# Patient Record
Sex: FEMALE | Race: WHITE | Employment: FULL TIME | URBAN - METROPOLITAN AREA
[De-identification: names, ages, dates, MRNs, and addresses within clinical notes are randomized per-mention and may not be internally consistent; named-entity substitution may affect disease eponyms.]

---

## 2017-01-04 ENCOUNTER — OFFICE VISIT (OUTPATIENT)
Dept: INTERNAL MEDICINE CLINIC | Age: 37
End: 2017-01-04

## 2017-01-04 ENCOUNTER — TELEPHONE (OUTPATIENT)
Dept: INTERNAL MEDICINE CLINIC | Age: 37
End: 2017-01-04

## 2017-01-04 VITALS
RESPIRATION RATE: 20 BRPM | SYSTOLIC BLOOD PRESSURE: 105 MMHG | HEIGHT: 65 IN | HEART RATE: 68 BPM | WEIGHT: 221.8 LBS | TEMPERATURE: 97.8 F | BODY MASS INDEX: 36.96 KG/M2 | DIASTOLIC BLOOD PRESSURE: 70 MMHG | OXYGEN SATURATION: 99 %

## 2017-01-04 DIAGNOSIS — E66.9 OBESITY (BMI 30-39.9): Primary | ICD-10-CM

## 2017-01-04 DIAGNOSIS — E06.3 HASHIMOTO'S DISEASE: ICD-10-CM

## 2017-01-04 NOTE — MR AVS SNAPSHOT
Visit Information Date & Time Provider Department Dept. Phone Encounter #  
 1/4/2017  8:30 AM Alexandria Iraheta NP Internist of [address] [place] [address] [id number] Follow-up Instructions Return in about 4 weeks (around 2/1/2017), or if symptoms worsen or fail to improve. Your Appointments 1/18/2017  8:80 PM  
METABOLIC PROGRAM 5 with HRMP WEEKLY CLASS TRICITIES  
HR Metabolic Program ([facility]) Appt Note: WL consult Abhijeet [facility] [address] [facility] [facility] [address]  
  
    
 1/25/2017  1:32 PM  
METABOLIC PROGRAM 5 with HRMP WEEKLY CLASS TRICITIES  
HR Metabolic Program ([facility]) Appt Note: WL consult [facility] [address]  
[phone] 2/14/2017  7:58 PM  
METABOLIC PROGRAM 15 with Alexandria Iraheta NP Internist of [facility] ([facility]) Appt Note: 3 mth f/u  
 [address], [address] [facility] [place] [address]  
  
   
 [address], [address]  
  
    
 3/14/2017  9:90 PM  
METABOLIC PROGRAM 15 with Alexandria Iraheta NP Internist of [facility] ([facility]) Appt Note: 1 mth f/u  
 [address], [address]  
[phone] Upcoming Health Maintenance Date Due Pneumococcal 19-64 Highest Risk (1 of 3 - PCV13) 8/22/1998 DTaP/Tdap/Td series (1 - Tdap) 8/22/2000 PAP AKA CERVICAL CYTOLOGY 8/22/2000 INFLUENZA AGE 9 TO ADULT 8/1/2016 Allergies as of 1/4/2017  Review Complete On: 1/4/2017 By: Alexandria Iraheta NP Severity Noted Reaction Type Reactions Sulfa (Sulfonamide Antibiotics)  04/15/2015    Rash Current Immunizations  Never Reviewed No immunizations on file. Not reviewed this visit You Were Diagnosed With   
  
 Codes Comments Obesity (BMI 30-39.9)    -  Primary ICD-10-CM: T91.3 ICD-9-CM: 278.00   
 Hashimoto's disease     ICD-10-CM: E06.3 ICD-9-CM: 549. 2 Vitals BP Pulse Temp Resp Height(growth percentile) Weight(growth percentile) 105/70 68 97.8 °F (36.6 °C) 20 5' 5\" (1.651 m) 221 lb 12.8 oz (100.6 kg) SpO2 BMI OB Status Smoking Status 99% 36.91 kg/m2 Polycystic Ovarian Syndrome Never Smoker Vitals History BMI and BSA Data Body Mass Index Body Surface Area  
 36.91 kg/m 2 2.15 m 2 Your Updated Medication List  
  
   
This list is accurate as of: 1/4/17  9:11 AM.  Always use your most recent med list.  
  
  
  
  
 Mai Showman 250-50 mcg/dose diskus inhaler Generic drug:  fluticasone-salmeterol Take 1 Puff by inhalation every twelve (12) hours as needed. albuterol 2.5 mg /3 mL (0.083 %) nebulizer solution Commonly known as:  PROVENTIL VENTOLIN  
by Nebulization route once. Indications: ACUTE ASTHMA ATTACK Biotin-Silicon Diox-L-Cysteine 5,000 mcg -100 mg-50 mg Tab Take 5,000 mcg by mouth two (2) times a day. levothyroxine 137 mcg tablet Commonly known as:  SYNTHROID Take 137 mcg by mouth Daily (before breakfast). PREMPRO 0.625-2.5 mg per tablet Generic drug:  estrogen (conjugated)-medroxyPROGESTERone Take 1 Tab by mouth daily. Follow-up Instructions Return in about 4 weeks (around 2/1/2017), or if symptoms worsen or fail to improve. To-Do List   
 02/04/2017 Lab:  ADIPONECTIN (HDL ONLY) 02/04/2017 Lab:  CRP, HIGH SENSITIVITY (HDL ONLY) 02/04/2017 Lab:  FIBRINOGEN (HDL ONLY) 02/04/2017 Lab:  HEMOGLOBIN A1C (HDL ONLY) 02/04/2017 Lab:  INSULIN (HDL ONLY) 02/04/2017 Lab:  LDL-P HDL (HDL ONLY) 02/04/2017 Lab:  LEPTIN (HDL ONLY) 02/04/2017 Lab:  LIPID PANEL (HDL ONLY) 02/04/2017 Lab:  LIPOPROTEIN (A) MASS (HDL ONLY) 02/04/2017 Lab:  LIPOPROTEIN ASSOC PHOSPHOLIPID (HDL ONLY) 02/04/2017 Lab: MAGNESIUM (HDL ONLY) 02/04/2017 Lab:  METABOLIC PANEL, COMPREHENSIVE (HDL ONLY) 02/04/2017 Lab:  SMALL DENSE LDL (HDL ONLY) 02/04/2017 Lab:  URIC ACID (HDL ONLY) 02/04/2017 Lab:  VITAMIN D, 25 HYDROXY (HDL ONLY) Patient Instructions I will call you with your lab results and will plan to start very low calorie 800 calorie- 4 meal replacements after this. Homework for [employer] 
 
Exercise Exercise daily  
- Start slow, gradually increase your time by around 10 to 20 % a week - To prevent injury, take a recovery week every 4 weeks (reduce your exercise time and intensity by 1/2 during this time) - Your goal is to work up to 150 min a week; hard enough that you can't whistle or sing. It may take 6 months to work up to this. - Call the Health  at [facility] labs for exercise ideas ([phone]) Diet Common Side Effects 
 
-Constipation 
-Fatigue 
-Hunger 
-Low sodium 
-Hair Loss 1. Constipation  
     -around 1 out of 5 chance it happens at all 
     -around 1 out of 10 chance you'll need to take something (see below) It can be prevented by Drinking at least 8 glasses of water a day If you're prone to constipation: - Take a Stool Softener every day:  (eg - Dulcolax Stool Softener 100 mg or Miralax) - Eat Plenty of Fiber Get the New Direction products with fiber added Keep your fiber intake to at least 20 grams a day Use SUGAR-FREE products: Fiber One products, Benefiber or Metamucil If you get constipated: 1. Start with a Stool Softener (produces a bowel movement in 72 hr): 
 Examples: 
  Miralax 1 capful twice a day (It's OK to go up to 3 caps for a few days) Dulcolax Stool Softener 100 mg 
 
2. If you still have constipation after 2 or 3 days, add a Stimulant Laxative to the Stool Softener (this will produce a bowel movement in 6 - 12 hr): 
 Examples: Exlax (1 small square) for up to 4 days Dulcolax stimulant laxative Magnesium citrate pill 500 mg start with once a day and go up to 3 times a day if needed 3. Or you can go straight to a combination Stool Softner / Stimulant at night. If you need, you can add a morning dose. Examples: 
  Pericolace 50 mg / 8.25 mg Sennakot-S  50 mg / 8.25 mg 
 
4. If You're Really locked up, get Liquid Magnesium Citrate (take according to the      directions) 2. Fatigue 
     -Everyone goes through this stage More common in the first 2 weeks It's your body adjusting to the Ketogenic diet and it's normal  
 
 
3. Hunger More common in the first few days After your body adjusts to the Ketogenic diet it will go away 4. Low blood levels of sodium 
     -Not very common, especially if you're not taking a fluid pil If you develop a headache, feel fatigued, light-headed or dizzy Add 1/2 cup of bouillon broth every day 5. Hair loss 
     -This is fairly uncommon; you may see more than a usual amount of hair in your brush or the shower drain This can happen with physical stress (surgery, trauma or calorie restriction) or psychological stress. Although is it very concerning, it is often temporary and will resolve within 3 months. Some people notice a benefit from taking a daily dose of Biotin 2,500 mcg and increasing their Fish Oil intake. Other Tips and Helpful Information - Get the following books (all found on [employer]) Change Anything by David Aguirre, Fain Albarran, and Fernanda Barnard Mindless Eating by Anna Daniels Perfectly Yourself by Tc Drake Me, Myself and I - 28 Days to Self-Love by Paige Smallwood - Consume your 4 daily meal replacements equally spaced over the    day No more than 6 hours between each meal 
 Breakfast is especially important - Get the support of family and friends 
 
- Snack-proof your house - Have strategies for social situations, meetings that run over or vacation - When you fall off the plan it's no big deal; just start right back; turn those days into examples of what to avoid next time. Long-term Healthy Weight / Body Fat Different ways to determining your ideal weight: 
1. Keep your waist to less than 1/2 of your height 2. Keep your % body fat to under 30% for female and under 20% if male 3. Keep your BMI around 25 Supplements 1. Vitacost Synergy Basic Multi-Vitamin (Their In-House Brand) Take 1 capsule twice a day Found ONLY at [facility], NOT at [facility], [facility], the Vitamin Shoppe, etc 
    SKU #: [id number]  
    $16.49   / 1 month supply 
    www. [employer]  [phone]  
 
 
2. N-Acetyl Cysteine (NAC) -- 600 mg 
     1 pill once a day Found at many stores but [address] has a good price: 
     Item #: NSI [id number]  $9.99 / 120 pills (4 month supply) 3. Turmeric with Black Pepper Extract (or Bioperine) 500 mg 
    1 pill 2 times a day (more is joint pain or increased inflammation) Found at many stores but [address] has a good price: 
    SKU #: [id number]  $13.64 / 60 pills 4. Fish Oil Take 1 capsule a day [employer] Brand: \"Omega 3 Fish Oil (per pill:  )\" OR 
 
Take 1 Tbsp 3 days a week of any of the following: 
 
Vit[employer] Brand: Lemon or Orange flavored Fish Oil Nutra Sea + D:  Apple flavored Nutra Sea:  Oliver flavored (These are found at most Vitamin Stores or on [employer]) FYI:  
Typical fish oil per pill =  mg and  mg 
Krill oil per pill = EPA 50 - 70 mg and DHA 27 - 250 mg 
 
 
^^^^^^^^^^^^^^^^^^^^^^^^^^^^^^^^^^^^^^^^^^^^^^^^^^^^^^^^^^^^^^^^^^^^^^^^^^^^^^^^^^^^^^^^^^^^^^^ Carbohydrates If you do not metabolize carbohydrates well, you will lose weight and keep the weigh off by keeping your carbohydrate intake low. How do you know if you do not metabolize carbs well? If your Triglycerides, sdLCL-C and Insulin Resistance are elevated, then you will do well to follow a low carb diet. Fun Facts About Carbs - The Typical American Diet = 450 grams a day - The Reducing Phase of the VLCD = 40 grams a day - Target for weight loss maintenance: 
 - Eat no more than 30 grams per meal 
 - Eat no more than 10 grams per snack (mid-morning and mid-afternoon snack) Resources for finding out where carbs hide in our foods: 
1. Our Registered Dietitians 2. Www. Atkins. com/tools 3. Read food labels 4. Books - The Calorie Maris Leon - The New Atkins Diet for a New You 
     - Jayleen Haley's NEW Carb and Calorie 4th Edition (my favorite) 5. Smart phone and Internet-based apps - [employer]  
     - Carb Manager If you want to get a better picture of your cardiac risk, consider getting a coronary calcium score:  Call [phone] to schedule one. Introducing [facility]! Dear Ramon Lovell: Thank you for requesting a [employer] account. Our records indicate that you already have an active [employer] account. You can access your account anytime at [email or web address] Did you know that you can access your hospital and ER discharge instructions at any time in [employer]? You can also review all of your test results from your hospital stay or ER visit. Additional Information If you have questions, please visit the Frequently Asked Questions section of the [employer] website at [email or web address] Remember, [employer] is NOT to be used for urgent needs. For medical emergencies, dial 911. Now available from your iPhone and Android! Please provide this summary of care documentation to your next provider. Your primary care clinician is listed as Beverely Hose.  If you have any questions after today's visit, please call [phone].

## 2017-01-04 NOTE — PROGRESS NOTES
[facility] Weight Loss Program Progress Note:   Re-enroll Initial Physician Visit    Mery Portillo is a 39 y.o. female who is here for medical screening for re-enrollment into the [facility] Weight Loss Program.    2016 she was transitioned into STAR maintenance program in 10/2016 at 205 lbs. Despite exercising 5-6 times a week and following a high protein low carb diet she has gained 16 lbs over the last 2 months. She would like to transition back to the CD to get back down to her goal weight of 170 lbs. She has just had her fasting labs completed this morning, they are not available for review at apt. Encounter Diagnoses   Name Primary?  Obesity (BMI 30-39. 9) Yes    Hashimoto's disease        Weight History  Current weight 221 lbs Body mass index is 36.91 kg/(m^2). Weight when patient stopped the program started star maintenance: 205 lbs  Goal weight 170lbs  Lowest weight on program 173 lbs    Starting weight: 4/15/15 at 298 lbs  Current weight: 221 lbs  Goal weight: 170 lbs    Most recent EKG: 10/14/15 at 194lbs      Weight loss History  See previous intake note    Significant Medical History  Have you ever taken appetite suppressants? no   If yes: Rx or OTC?  n/a   If yes; Any negative side effects?   Ever diagnosed with sleep apnea or put on CPAP? no  Ever had bariatric surgery: no  MI w/ in the last 3 months: no  Type I Diabetes: no  Type II Diabetes: no  Liver or Kidney disease requiring protein restriction: no  Pregnant or planning on becoming pregnant w/in 6 months: no  Recent treatment for Cancer: no  History of Uric-acid Kidney Stone or elevated Uric Acid: no  Peptic ulcer disease not well controlled: no  Recent onset of Inflammatory Bowel Disease: no      If female:     Significant Psychosocial History (before starting or since stopping the program)  Any history of drug abuse or dependence: no  Major lifestyle changes: no  Other significant commitments: no  Any potential unsupportive people: no  What lead to you stopping the program? She had transitioned to star maintenance 10/14/2016 at 194 lbs. She then gained 27lbs over the last 2 months. She did meet with endocrinology and they did discuss that she will always have trouble with weight loss and gain due to hashimotos. Why are you starting back on the program now?  yes  Are you ready? yes    History of binge eating disorder or anorexia : no       Social History  Social History   Substance Use Topics    Smoking status: Never Smoker    Smokeless tobacco: Never Used    Alcohol use No       Exercise  How many days a week do you currently exercise? 5    Do you have any food allergies or sensitivities: no      Objective  Visit Vitals    /70    Pulse 68    Temp 97.8 °F (36.6 °C)    Resp 20    Ht 5' 5\" (1.651 m)    Wt 221 lb 12.8 oz (100.6 kg)    SpO2 99%    BMI 36.91 kg/m2     No LMP recorded. Patient is not currently having periods (Reason: Polycystic Ovarian Syndrome).     ROS   Positives in BOLD    General: negative for - chills, fatigue, fever, weight change  Psych: negative for - anxiety, depression  ENT: negative for - hearing change, nasal congestion, oral lesions,or sore throat  Heme/ Lymph: negative for - bleeding problems, bruising,  or swollen lymph nodes  Endo: negative for - hot flashes, polydipsia/polyuria or temperature intolerance  Resp: negative for - cough, shortness of breath or wheezing  CV: negative for - chest pain, edema or palpitations  GI: negative for - abdominal pain, change in bowel habits, constipation, diarrhea or nausea/vomiting, melena, hematochezia  : negative for - dysuria, hematuria, incontinence  MSK: negative for - joint pain, joint swelling or muscle pain  Neuro: negative for - confusion, headaches, seizures or weakness  Derm: negative for -rash or skin lesion changes      Physical Exam  Appearance: well appearing, obese  HEENT:  Scleral icterus?  no  Neck: Thyromegaly or nodules?  no  Heart:  RRR no murmurs rubs or gallops  Lungs:  CTA  Abdomen:     Hepatomegaly? no   Striae present? yes  Ext:  No LE edema      Encounter Diagnoses   Name Primary?  Obesity (BMI 30-39. 9) Yes    Hashimoto's disease           Plan  1. Meds & interval medical history were reviewed  2. Pt just had labs drawn this morning. She will be going on vacation this week. Will plan to restart VLCD after receiving and reviewing lab results this week, and after return from her trip. She will begin weekly classes when she returns from her trip as well. 3. Continue to follow with PCP and endocrinology for hashimotos. Will be monitoring thyroid levels on labs.

## 2017-01-04 NOTE — PATIENT INSTRUCTIONS
I will call you with your lab results and will plan to start very low calorie 800 calorie- 4 meal replacements after this. Homework for [employer]    Exercise    Exercise daily   - Start slow, gradually increase your time by around 10 to 20 % a week    - To prevent injury, take a recovery week every 4 weeks (reduce your exercise time and intensity by 1/2 during this time)    - Your goal is to work up to 150 min a week; hard enough that you can't whistle or sing. It may take 6 months to work up to this. - Call the Health  at [facility] labs for exercise ideas ([phone])          Diet          Common Side Effects    -Constipation  -Fatigue  -Hunger  -Low sodium  -Hair Loss      1. Constipation        -around 1 out of 5 chance it happens at all       -around 1 out of 10 chance you'll need to take something (see below)    It can be prevented by Drinking at least 8 glasses of water a day    If you're prone to constipation:  - Take a Stool Softener every day:  (eg - Dulcolax Stool Softener 100 mg or Miralax)  - Eat Plenty of Fiber   Get the New Direction products with fiber added   Keep your fiber intake to at least 20 grams a day   Use SUGAR-FREE products: Fiber One products, Benefiber or Metamucil      If you get constipated:  1. Start with a Stool Softener (produces a bowel movement in 72 hr):   Examples:    Miralax 1 capful twice a day (It's OK to go up to 3 caps for a few days)    Dulcolax Stool Softener 100 mg    2. If you still have constipation after 2 or 3 days, add a Stimulant Laxative to the Stool Softener (this will produce a bowel movement in 6 - 12 hr):   Examples:    Exlax (1 small square) for up to 4 days    Dulcolax stimulant laxative    Magnesium citrate pill 500 mg start with once a day and go up to 3 times a day if needed    3. Or you can go straight to a combination Stool Softner / Stimulant at night.   If you need, you can add a morning dose.   Examples:    Pericolace 50 mg / 8.25 mg    Sennakot-S  50 mg / 8.25 mg    4. If You're Really locked up, get Liquid Magnesium Citrate (take according to the      directions)      2. Fatigue       -Everyone goes through this stage  More common in the first 2 weeks  It's your body adjusting to the Ketogenic diet and it's normal       3. Hunger  More common in the first few days  After your body adjusts to the Ketogenic diet it will go away       4. Low blood levels of sodium       -Not very common, especially if you're not taking a fluid pil  If you develop a headache, feel fatigued, light-headed or dizzy  Add 1/2 cup of bouillon broth every day      5. Hair loss       -This is fairly uncommon; you may see more than a usual amount of hair in your brush or the shower drain  This can happen with physical stress (surgery, trauma or calorie restriction) or psychological stress. Although is it very concerning, it is often temporary and will resolve within 3 months. Some people notice a benefit from taking a daily dose of Biotin 2,500 mcg and increasing their Fish Oil intake. Other Tips and Helpful Information    - Get the following books (all found on [address] [facility] [address])    Change Anything by Naya Burns, Janine Rangel, and Rodger Dill    Mindless Eating by David Roy    Perfectly Yourself by Jonathan Matute    Me, Myself and I - 28 Days to Self-Love by Sarita Valdovinos your 4 daily meal replacements equally spaced over the    day   No more than 6 hours between each meal   Breakfast is especially important    - Get the support of family and friends    - Snack-proof your house    - Have strategies for social situations, meetings that run over or vacation      - When you fall off the plan it's no big deal; just start right back; turn those days into examples of what to avoid next time. Long-term Healthy Weight / Body Fat  Different ways to determining your ideal weight:  1.  Keep your waist to less than 1/2 of your height   2. Keep your % body fat to under 30% for female and under 20% if male  3. Keep your BMI around 25        Supplements      1. Vitacost Synergy Basic Multi-Vitamin (Their In-House Brand)      Take 1 capsule twice a day        Found ONLY at [facility], NOT at [facility], [employer], [employer], the Vitamin Shoppe, etc      SKU #: [id number]       $16.49   / 1 month supply      www. [employer]  [phone]       2. N-Acetyl Cysteine (NAC) -- 600 mg       1 pill once a day       Found at many stores but [employer] has a good price:       Item #: [id number]  $9.99 / 120 pills (4 month supply)      3. Turmeric with Black Pepper Extract (or Bioperine) 500 mg      1 pill 2 times a day (more is joint pain or increased inflammation)      Found at many stores but [employer] has a good price:      SKU #: [id number]  $13.64 / 60 pills        4. Fish Oil      Take 1 capsule a day      Vitamin Shoppe Brand: \"Omega 3 Fish Oil (per pill:  )\"                                                               OR    Take 1 Tbsp 3 days a week of any of the following:    Vitamin Shoppe Brand: Lemon or Orange flavored Fish Oil   Nutra Sea + D:  Apple flavored   Nutra Sea:  Oliver flavored   (These are found at most Vitamin Stores or on [employer])    FYI:   Typical fish oil per pill =  mg and  mg  Krill oil per pill = EPA 50 - 70 mg and DHA 27 - 250 mg      ^^^^^^^^^^^^^^^^^^^^^^^^^^^^^^^^^^^^^^^^^^^^^^^^^^^^^^^^^^^^^^^^^^^^^^^^^^^^^^^^^^^^^^^^^^^^^^^      Carbohydrates     If you do not metabolize carbohydrates well, you will lose weight and keep the weigh off by keeping your carbohydrate intake low. How do you know if you do not metabolize carbs well? If your Triglycerides, sdLCL-C and Insulin Resistance are elevated, then you will do well to follow a low carb diet.     Fun Facts About Carbs    - The Typical American Diet = 450 grams a day    - The Reducing Phase of the VLCD = 40 grams a day    - Target for weight loss maintenance:   - Eat no more than 30 grams per meal   - Eat no more than 10 grams per snack (mid-morning and mid-afternoon snack)        Resources for finding out where carbs hide in our foods:  1. Our Registered Dietitians    2. Www. Atkins. com/tools    3. Read food labels    4. Books       - The Calorie Mitchell Estrella       - The Steven System Diet for a New You       - Jayleen Haley's NEW Carb and Calorie 4th Edition (my favorite)    5. Smart phone and Internet-based apps       - [employer]        - Carb Manager      If you want to get a better picture of your cardiac risk, consider getting a coronary calcium score:  Call [phone] to schedule one.

## 2017-01-18 ENCOUNTER — PATIENT MESSAGE (OUTPATIENT)
Dept: INTERNAL MEDICINE CLINIC | Age: 37
End: 2017-01-18

## 2017-01-18 ENCOUNTER — CLINICAL SUPPORT (OUTPATIENT)
Dept: FAMILY MEDICINE CLINIC | Age: 37
End: 2017-01-18

## 2017-01-18 VITALS
WEIGHT: 224.2 LBS | BODY MASS INDEX: 37.36 KG/M2 | HEIGHT: 65 IN | DIASTOLIC BLOOD PRESSURE: 74 MMHG | HEART RATE: 80 BPM | SYSTOLIC BLOOD PRESSURE: 110 MMHG

## 2017-01-18 DIAGNOSIS — E66.9 OBESITY (BMI 30-39.9): Primary | ICD-10-CM

## 2017-01-18 NOTE — PROGRESS NOTES
Progress Note: Weekly Education Class in the [facility] Weight Loss Program   Is there anything that you or the patient needs to let the [address] [facility] Physician know about? no  Over the past week, have you experienced any side-effects? no    Hamilton Ching is a 39 y.o. female who is enrolled in [facility]    Visit Vitals    /74 (BP 1 Location: Left arm, BP Patient Position: Sitting)    Pulse 80    Ht 5' 5\" (1.651 m)    Wt 224 lb 3.2 oz (101.7 kg)    BMI 37.31 kg/m2     Weight Metrics 1/18/2017 1/18/2017 1/4/2017 11/15/2016 11/15/2016 10/12/2016 10/7/2016   Weight - 224 lb 3.2 oz 221 lb 12.8 oz - 212 lb 205 lb 12.8 oz -   Waist Measure Inches 40 - - 37 - - 36   Exercise Mins/week - - - 220 - - -   Body Fat % - - - 39.4 - - -   BMI - 37.31 kg/m2 36.91 kg/m2 - 35.28 kg/m2 34.25 kg/m2 -         Have you received any other medical care this week? no  If yes, where and for what? Have you had any change in your medications since your last visit? no  If yes what? Did you have any problems adhering to the program last week? no  If yes, please explain:       Eating Habits Over Last Week:  Did you take in 64 oz of non-caloric fluids? yes     Did you consume your 4 meal replacements each day?  yes       Physical Activity Over the Past Week:    Aerobic exercise: 115 min  Resistance exercise: 30 min workouts / week

## 2017-01-23 DIAGNOSIS — E06.3 HASHIMOTO'S DISEASE: ICD-10-CM

## 2017-01-23 DIAGNOSIS — E66.9 OBESITY (BMI 30-39.9): ICD-10-CM

## 2017-01-23 NOTE — PROGRESS NOTES
Nurse note from patient's weekly VLCD / LCD / Maintenance class was reviewed. Pertinent medical concerns were:  None noted. Vitals 1/18/2017 1/4/2017 11/15/2016 10/12/2016 10/7/2016   Weight 224 lb 3.2 oz 221 lb 12.8 oz 212 lb 205 lb 12.8 oz 203 lb     Vitals 9/28/2016 9/21/2016 9/13/2016   Weight 203 lb 9.6 oz 204 lb 201 lb 11.2 oz     Continue current regimen.

## 2017-01-25 ENCOUNTER — CLINICAL SUPPORT (OUTPATIENT)
Dept: FAMILY MEDICINE CLINIC | Age: 37
End: 2017-01-25

## 2017-01-25 VITALS
HEART RATE: 71 BPM | DIASTOLIC BLOOD PRESSURE: 74 MMHG | BODY MASS INDEX: 36.59 KG/M2 | WEIGHT: 219.6 LBS | HEIGHT: 65 IN | SYSTOLIC BLOOD PRESSURE: 115 MMHG

## 2017-01-25 DIAGNOSIS — E66.9 OBESITY (BMI 30-39.9): Primary | ICD-10-CM

## 2017-01-25 NOTE — PROGRESS NOTES
Progress Note: Weekly Education Class in the [facility]   Is there anything that you or the patient needs to let the [facility] Physician know about? no  Over the past week, have you experienced any side-effects? no    Yousif Galicia is a 39 y.o. female who is enrolled in [facility]    Visit Vitals    /74 (BP 1 Location: Left arm, BP Patient Position: Sitting)    Pulse 71    Ht 5' 5\" (1.651 m)    Wt 219 lb 9.6 oz (99.6 kg)    BMI 36.54 kg/m2     Weight Metrics 1/25/2017 1/18/2017 1/18/2017 1/4/2017 11/15/2016 11/15/2016 10/12/2016   Weight 219 lb 9.6 oz - 224 lb 3.2 oz 221 lb 12.8 oz - 212 lb 205 lb 12.8 oz   Waist Measure Inches 39 40 - - 37 - -   Exercise Mins/week - - - - 220 - -   Body Fat % - - - - 39.4 - -   BMI 36.54 kg/m2 - 37.31 kg/m2 36.91 kg/m2 - 35.28 kg/m2 34.25 kg/m2         Have you received any other medical care this week? no  If yes, where and for what? Have you had any change in your medications since your last visit? yes  If yes what? Started over the counter medication Vitamin D 2,000 International Units per day    Did you have any problems adhering to the program last week? no  If yes, please explain:       Eating Habits Over Last Week:  Did you take in 64 oz of non-caloric fluids? yes     Did you consume your 4 meal replacements each day?  yes       Physical Activity Over the Past Week:    Aerobic exercise: 150 min  Resistance exercise: 150 min workouts / week

## 2017-01-27 NOTE — PROGRESS NOTES
Nurse note from patient's weekly VLCD / LCD / Maintenance class was reviewed. Pertinent medical concerns were:  None noted. Vitals 1/25/2017 1/18/2017 1/4/2017 11/15/2016   Weight 219 lb 9.6 oz 224 lb 3.2 oz 221 lb 12.8 oz 212 lb     Vitals 10/12/2016 10/7/2016   Weight 205 lb 12.8 oz 203 lb     Continue current regimen.

## 2017-02-01 ENCOUNTER — CLINICAL SUPPORT (OUTPATIENT)
Dept: FAMILY MEDICINE CLINIC | Age: 37
End: 2017-02-01

## 2017-02-01 VITALS
BODY MASS INDEX: 36.39 KG/M2 | DIASTOLIC BLOOD PRESSURE: 74 MMHG | HEIGHT: 65 IN | HEART RATE: 72 BPM | WEIGHT: 218.4 LBS | SYSTOLIC BLOOD PRESSURE: 111 MMHG

## 2017-02-01 DIAGNOSIS — E66.9 OBESITY (BMI 30-39.9): Primary | ICD-10-CM

## 2017-02-01 NOTE — PROGRESS NOTES
Progress Note: Weekly Education Class in the [facility] Loss [facility]   Is there anything that you or the patient needs to let the [facility] Physician know about? no  Over the past week, have you experienced any side-effects? no    Sylvia Arias is a 39 y.o. female who is enrolled in [facility] Loss [facility]    Visit Vitals    /74 (BP 1 Location: Left arm, BP Patient Position: Sitting)    Pulse 72    Ht 5' 5\" (1.651 m)    Wt 218 lb 6.4 oz (99.1 kg)    BMI 36.34 kg/m2     Weight Metrics 2/1/2017 1/25/2017 1/18/2017 1/18/2017 1/4/2017 11/15/2016 11/15/2016   Weight 218 lb 6.4 oz 219 lb 9.6 oz - 224 lb 3.2 oz 221 lb 12.8 oz - 212 lb   Waist Measure Inches 36.5 39 40 - - 37 -   Exercise Mins/week - - - - - 220 -   Body Fat % - - - - - 39.4 -   BMI 36.34 kg/m2 36.54 kg/m2 - 37.31 kg/m2 36.91 kg/m2 - 35.28 kg/m2         Have you received any other medical care this week? no  If yes, where and for what? Have you had any change in your medications since your last visit? no  If yes what? Did you have any problems adhering to the program last week? no  If yes, please explain:       Eating Habits Over Last Week:  Did you take in 64 oz of non-caloric fluids? yes     Did you consume your 4 meal replacements each day?  yes       Physical Activity Over the Past Week:    Aerobic exercise: 150 min  Resistance exercise: 150 min workouts / week

## 2017-02-05 NOTE — PROGRESS NOTES
Nurse note from patient's weekly VLCD / LCD / Maintenance class was reviewed. Pertinent medical concerns were:  None noted. Vitals 2/1/2017 1/25/2017 1/18/2017 1/4/2017 11/15/2016   Weight 218 lb 6.4 oz 219 lb 9.6 oz 224 lb 3.2 oz 221 lb 12.8 oz 212 lb     Vitals 10/12/2016 10/7/2016   Weight 205 lb 12.8 oz 203 lb     Continue current regimen.

## 2017-02-08 ENCOUNTER — CLINICAL SUPPORT (OUTPATIENT)
Dept: FAMILY MEDICINE CLINIC | Age: 37
End: 2017-02-08

## 2017-02-08 VITALS
WEIGHT: 216.4 LBS | SYSTOLIC BLOOD PRESSURE: 113 MMHG | BODY MASS INDEX: 36.06 KG/M2 | HEART RATE: 79 BPM | DIASTOLIC BLOOD PRESSURE: 75 MMHG | HEIGHT: 65 IN

## 2017-02-08 DIAGNOSIS — E66.9 OBESITY (BMI 30-39.9): Primary | ICD-10-CM

## 2017-02-08 NOTE — PROGRESS NOTES
Progress Note: Weekly Education Class in the [facility] Loss [facility]   Is there anything that you or the patient needs to let the [facility] Physician know about? no  Over the past week, have you experienced any side-effects? no    Karely Mandel is a 39 y.o. female who is enrolled in [facility]    Visit Vitals    /75 (BP 1 Location: Left arm, BP Patient Position: Sitting)    Pulse 79    Ht 5' 5\" (1.651 m)    Wt 216 lb 6.4 oz (98.2 kg)    BMI 36.01 kg/m2     Weight Metrics 2/8/2017 2/1/2017 1/25/2017 1/18/2017 1/18/2017 1/4/2017 11/15/2016   Weight 216 lb 6.4 oz 218 lb 6.4 oz 219 lb 9.6 oz - 224 lb 3.2 oz 221 lb 12.8 oz -   Waist Measure Inches 39 36.5 39 40 - - 37   Exercise Mins/week - - - - - - 220   Body Fat % - - - - - - 39.4   BMI 36.01 kg/m2 36.34 kg/m2 36.54 kg/m2 - 37.31 kg/m2 36.91 kg/m2 -         Have you received any other medical care this week? no  If yes, where and for what? Have you had any change in your medications since your last visit? no  If yes what? Did you have any problems adhering to the program last week? no  If yes, please explain:       Eating Habits Over Last Week:  Did you take in 64 oz of non-caloric fluids? yes     Did you consume your 4 meal replacements each day?  yes       Physical Activity Over the Past Week:    Aerobic exercise: 150 min  Resistance exercise: 150 min workouts / week

## 2017-02-12 NOTE — PROGRESS NOTES
Nurse note from patient's weekly VLCD / LCD / Maintenance class was reviewed. Pertinent medical concerns were:  None noted. .  Vitals 2/8/2017 2/1/2017 1/25/2017 1/18/2017   Weight 216 lb 6.4 oz 218 lb 6.4 oz 219 lb 9.6 oz 224 lb 3.2 oz     Vitals 1/4/2017 11/15/2016   Weight 221 lb 12.8 oz 212 lb     Continue current regimen.

## 2017-02-14 ENCOUNTER — OFFICE VISIT (OUTPATIENT)
Dept: INTERNAL MEDICINE CLINIC | Age: 37
End: 2017-02-14

## 2017-02-14 VITALS
TEMPERATURE: 98.3 F | SYSTOLIC BLOOD PRESSURE: 113 MMHG | DIASTOLIC BLOOD PRESSURE: 68 MMHG | BODY MASS INDEX: 36.14 KG/M2 | RESPIRATION RATE: 20 BRPM | HEART RATE: 74 BPM | OXYGEN SATURATION: 98 % | HEIGHT: 65 IN | WEIGHT: 216.9 LBS

## 2017-02-14 DIAGNOSIS — E66.9 OBESITY (BMI 30-39.9): Primary | ICD-10-CM

## 2017-02-14 DIAGNOSIS — E03.9 ACQUIRED HYPOTHYROIDISM: ICD-10-CM

## 2017-02-14 NOTE — PATIENT INSTRUCTIONS
Health Maintenance Due   Topic Date Due    DTaP/Tdap/Td series (1 - Tdap) 08/22/2000    PAP AKA CERVICAL CYTOLOGY  08/22/2000    INFLUENZA AGE 9 TO ADULT  08/01/2016     Goals  - continue with 4 meal replacements a day. - continue with 5 work outs a week. Weight loss goal for 4 weeks:  8   Lbs. You can do it!!! Body Mass Index: Care Instructions  Your Care Instructions    Body mass index (BMI) can help you see if your weight is raising your risk for health problems. It uses a formula to compare how much you weigh with how tall you are. A BMI between 18.5 and 24.9 is considered healthy. A BMI between 25 and 29.9 is considered overweight. A BMI of 30 or higher is considered obese. If your BMI is in the normal range, it means that you have a lower risk for weight-related health problems. If your BMI is in the overweight or obese range, you may be at increased risk for weight-related health problems, such as high blood pressure, heart disease, stroke, arthritis or joint pain, and diabetes. BMI is just one measure of your risk for weight-related health problems. You may be at higher risk for health problems if you are not active, you eat an unhealthy diet, or you drink too much alcohol or use tobacco products. Follow-up care is a key part of your treatment and safety. Be sure to make and go to all appointments, and call your doctor if you are having problems. It's also a good idea to know your test results and keep a list of the medicines you take. How can you care for yourself at home? · Practice healthy eating habits. This includes eating plenty of fruits, vegetables, whole grains, lean protein, and low-fat dairy. · Get at least 30 minutes of exercise 5 days a week or more. Brisk walking is a good choice. You also may want to do other activities, such as running, swimming, cycling, or playing tennis or team sports. · Do not smoke. Smoking can increase your risk for health problems.  If you need help quitting, talk to your doctor about stop-smoking programs and medicines. These can increase your chances of quitting for good. · Limit alcohol to 2 drinks a day for men and 1 drink a day for women. Too much alcohol can cause health problems. If you have a BMI higher than 25  · Your doctor may do other tests to check your risk for weight-related health problems. This may include measuring the distance around your waist. A waist measurement of more than 40 inches in men or 35 inches in women can increase the risk of weight-related health problems. · Talk with your doctor about steps you can take to stay healthy or improve your health. You may need to make lifestyle changes to lose weight and stay healthy, such as changing your diet and getting regular exercise. Where can you learn more? Go to http://shirley-pawan.info/. Enter S176 in the search box to learn more about \"Body Mass Index: Care Instructions. \"  Current as of: February 16, 2016  Content Version: 11.1  © 6820-1981 [employer]. Care instructions adapted under license by [employer] (which disclaims liability or warranty for this information). If you have questions about a medical condition or this instruction, always ask your healthcare professional. [address] any warranty or liability for your use of this information.

## 2017-02-14 NOTE — PROGRESS NOTES
[facility] Progress Note:   F/up Physician Visit    CC: Obesity      Bita Bellamy is a 39 y.o. female who is here for her  f/up physician visit for the VLCD Program. Fatoumata Vásquez has completed 5 weeks of the program to date. She has lost 8 lbs in the last 4 weeks. Hypothyroidism: continues on synthroid 137mcg. Follows with [facility] endocrinology. Starting weight: 4/15/15 at 298 lbs  Current weight: 216 lbs  Goal weight: 170 lbs     Most recent EKG: 10/14/15 at 194lbs    Weight Metrics 2/14/2017 2/8/2017 2/1/2017 1/25/2017 1/18/2017 1/18/2017 1/4/2017   Weight 216 lb 14.4 oz 216 lb 6.4 oz 218 lb 6.4 oz 219 lb 9.6 oz - 224 lb 3.2 oz 221 lb 12.8 oz   Waist Measure Inches 39 39 36.5 39 40 - -   Exercise Mins/week 150 - - - - - -   Body Fat % 40.1 - - - - - -   BMI 36.09 kg/m2 36.01 kg/m2 36.34 kg/m2 36.54 kg/m2 - 37.31 kg/m2 36.91 kg/m2         Current Outpatient Prescriptions   Medication Sig Dispense Refill    levothyroxine (SYNTHROID) 137 mcg tablet Take 137 mcg by mouth Daily (before breakfast).  Biotin-Silicon Diox-L-Cysteine 5,000 mcg-100 mg- 50 mg tab Take 5,000 mcg by mouth two (2) times a day.  fluticasone-salmeterol (ADVAIR DISKUS) 250-50 mcg/dose diskus inhaler Take 1 Puff by inhalation every twelve (12) hours as needed.  albuterol (PROVENTIL VENTOLIN) 2.5 mg /3 mL (0.083 %) nebulizer solution by Nebulization route once. Indications: ACUTE ASTHMA ATTACK      estrogen, conjugated,-medroxyPROGESTERone (PREMPRO) 0.625-2.5 mg per tablet Take 1 Tab by mouth daily. Participation   Did you attend clinic and class last week? yes    Review of Systems  Since your last visit, have you experienced any complications? no  If yes, please list:     No CP, SOB, palpitations, lightheadedness, dizziness, constipation. Positives highlight in BOLD. Are you taking an appetite suppressant? no  If so, is there any Chest Pain, Palpitations or Dizziness?   BP Readings from Last 10 Encounters:   02/14/17 113/68   02/08/17 113/75   02/01/17 111/74   01/25/17 115/74   01/18/17 110/74   01/04/17 105/70   11/15/16 107/71   10/12/16 119/80   10/07/16 117/74   09/28/16 114/77         Have you received any other medical care this week? no  If yes, where and for what? Have you discontinued or changed any medicine or dose of your medicine since your last visit with Dr Sharyle Cheng? no  If yes, where and for what? Diet  How many ounces of calorie-free liquids did you consume each day?  120 oz    How many meal replacements did you take each day? 4    Did you have any problems adhering to the program?  no If yes, please explain:       Exercise  Aerobic exercise: 150 min  Resistance exercise: 150 min workouts / week  Any discomfort?  no     If yes, where? Review of Systems  Complete ROS negative except where noted above    Objective  Visit Vitals    /68 (BP 1 Location: Left arm, BP Patient Position: Sitting)    Pulse 74    Temp 98.3 °F (36.8 °C) (Oral)    Resp 20    Ht 5' 5\" (1.651 m)    Wt 216 lb 14.4 oz (98.4 kg)    SpO2 98%    BMI 36.09 kg/m2     No LMP recorded. Patient is not currently having periods (Reason: Polycystic Ovarian Syndrome). Waist Circumference: I personally reviewed patient's Weight Management Doc Flowsheet  Neck Circumference: I personally reviewed patient's Weight Management Doc Flowsheet  Percent Body Fat: I personally reviewed patient's Weight Management Doc Flowsheet    Physical Exam  Appearance: well appearing, obese, A&O, NAD  HEENT:  NC/AT, PERRL, No scleral icterus  Heart:  RRR without M/R/G  Lungs:  CTAB, no rhonchi, rales, or wheezes with good air exchange   Ext:  No LE Edema    Assessment / Plan    Encounter Diagnoses   Name Primary?  Obesity (BMI 30-39. 9) Yes    Acquired hypothyroidism        1. Weight management improved   Progress was reviewed with patient  - continue with 4 meal replacements a day.   - continue with 5 work outs a week. Weight loss goal for 4 weeks:  8   Lbs.   2.  Labs    Latest results reviewed with patient   Lab slip given to pt for f/up HDL labs    3. Hypothyroidism: continues on synthroid 137mcg daily. tsh not on last labs, confirmed that it would be checked on this set. Patient Instructions     Health Maintenance Due   Topic Date Due    DTaP/Tdap/Td series (1 - Tdap) 08/22/2000    PAP AKA CERVICAL CYTOLOGY  08/22/2000    INFLUENZA AGE 9 TO ADULT  08/01/2016     Goals  - continue with 4 meal replacements a day. - continue with 5 work outs a week. Weight loss goal for 4 weeks:  8   Lbs. You can do it!!! Body Mass Index: Care Instructions  Your Care Instructions    Body mass index (BMI) can help you see if your weight is raising your risk for health problems. It uses a formula to compare how much you weigh with how tall you are. A BMI between 18.5 and 24.9 is considered healthy. A BMI between 25 and 29.9 is considered overweight. A BMI of 30 or higher is considered obese. If your BMI is in the normal range, it means that you have a lower risk for weight-related health problems. If your BMI is in the overweight or obese range, you may be at increased risk for weight-related health problems, such as high blood pressure, heart disease, stroke, arthritis or joint pain, and diabetes. BMI is just one measure of your risk for weight-related health problems. You may be at higher risk for health problems if you are not active, you eat an unhealthy diet, or you drink too much alcohol or use tobacco products. Follow-up care is a key part of your treatment and safety. Be sure to make and go to all appointments, and call your doctor if you are having problems. It's also a good idea to know your test results and keep a list of the medicines you take. How can you care for yourself at home? · Practice healthy eating habits.  This includes eating plenty of fruits, vegetables, whole grains, lean protein, and low-fat dairy. · Get at least 30 minutes of exercise 5 days a week or more. Brisk walking is a good choice. You also may want to do other activities, such as running, swimming, cycling, or playing tennis or team sports. · Do not smoke. Smoking can increase your risk for health problems. If you need help quitting, talk to your doctor about stop-smoking programs and medicines. These can increase your chances of quitting for good. · Limit alcohol to 2 drinks a day for men and 1 drink a day for women. Too much alcohol can cause health problems. If you have a BMI higher than 25  · Your doctor may do other tests to check your risk for weight-related health problems. This may include measuring the distance around your waist. A waist measurement of more than 40 inches in men or 35 inches in women can increase the risk of weight-related health problems. · Talk with your doctor about steps you can take to stay healthy or improve your health. You may need to make lifestyle changes to lose weight and stay healthy, such as changing your diet and getting regular exercise. Where can you learn more? Go to http://shirley-pawan.info/. Enter S176 in the search box to learn more about \"Body Mass Index: Care Instructions. \"  Current as of: February 16, 2016  Content Version: 11.1  © 5661-8863 [employer]. Care instructions adapted under license by [employer] (which disclaims liability or warranty for this information). If you have questions about a medical condition or this instruction, always ask your healthcare professional. [place] [address] any warranty or liability for your use of this information. Follow-up Disposition:  Return in about 4 weeks (around 3/14/2017). 10 minutes of the 15 minutes face to face time with Stacia Mendoza consisted of counseling & coordinating and/or discussing treatment plans in reference to her obesity.   The primary encounter diagnosis was Obesity (BMI 30-39.9). A diagnosis of Acquired hypothyroidism was also pertinent to this visit. The patient is to follow up as scheduled and will report to the ED or the office if symptoms change or increase. The patient has voiced understanding and will comply.

## 2017-02-14 NOTE — PROGRESS NOTES
Chief Complaint   Patient presents with    Weight Management     1. Have you been to the ER, urgent care clinic since your last visit? Hospitalized since your last visit? No    2. Have you seen or consulted any other health care providers outside of the [address] [place] [address] since your last visit? Include any pap smears or colon screening.  No

## 2017-02-14 NOTE — MR AVS SNAPSHOT
Visit Information Date & Time Provider Department Dept. Phone Encounter #  
 2/14/2017  3:00 PM Anay Owusu [place] Internist of [facility] [id number] Follow-up Instructions Return in about 4 weeks (around 3/14/2017). Follow-up and Disposition History Your Appointments 2/22/2017  6:51 PM  
METABOLIC PROGRAM 5 with HRMP WEEKLY CLASS TRICITIES  
HR Metabolic Program ([facility]) Appt Note: weekly class HR Metabolic Program ([facility]) [phone]  
  
    
 3/1/2017  7:91 PM  
METABOLIC PROGRAM 5 with HRMP WEEKLY CLASS TRICITIES  
HR Metabolic Program ([facility]) Appt Note: weekly class HR Metabolic Program ([facility]) [phone]  
  
    
 3/8/2017  0:18 PM  
METABOLIC PROGRAM 5 with HRMP WEEKLY CLASS TRICITIES  
HR Metabolic Program ([facility]) Appt Note: weekly class HR Metabolic Program ([facility]) [phone] 3/14/2017  7:93 PM  
METABOLIC PROGRAM 15 with Anay Owusu NP Internist of [facility] ([facility]) Appt Note: 1 mth f/u  
 [id number] [facility] [phone] [address] [facility] Health Maintenance Date Due DTaP/Tdap/Td series (1 - Tdap) 8/22/2000 PAP AKA CERVICAL CYTOLOGY 8/22/2000 INFLUENZA AGE 9 TO ADULT 8/1/2016 Allergies as of 2/14/2017  Review Complete On: 2/14/2017 By: Anay Owusu NP Severity Noted Reaction Type Reactions Sulfa (Sulfonamide Antibiotics)  04/15/2015    Rash Current Immunizations  Never Reviewed No immunizations on file. Not reviewed this visit You Were Diagnosed With   
  
 Codes Comments Obesity (BMI 30-39.9)    -  Primary ICD-10-CM: G21.6 ICD-9-CM: 278.00 Acquired hypothyroidism     ICD-10-CM: E03.9 ICD-9-CM: 723. 9 Vitals BP Pulse Temp Resp Height(growth percentile) Weight(growth percentile) 113/68 (BP 1 Location: Left arm, BP Patient Position: Sitting) 74 98.3 °F (36.8 °C) (Oral) 20 5' 5\" (1.651 m) 216 lb 14.4 oz (98.4 kg) SpO2 BMI OB Status Smoking Status 98% 36.09 kg/m2 Polycystic Ovarian Syndrome Never Smoker BMI and BSA Data Body Mass Index Body Surface Area 36.09 kg/m 2 2.12 m 2 Your Updated Medication List  
  
   
This list is accurate as of: 2/14/17  3:40 PM.  Always use your most recent med list.  
  
  
  
  
 Pradeep Mins 250-50 mcg/dose diskus inhaler Generic drug:  fluticasone-salmeterol Take 1 Puff by inhalation every twelve (12) hours as needed. albuterol 2.5 mg /3 mL (0.083 %) nebulizer solution Commonly known as:  PROVENTIL VENTOLIN  
by Nebulization route once. Indications: ACUTE ASTHMA ATTACK Biotin-Silicon Diox-L-Cysteine 5,000 mcg -100 mg-50 mg Tab Take 5,000 mcg by mouth two (2) times a day. levothyroxine 137 mcg tablet Commonly known as:  SYNTHROID Take 137 mcg by mouth Daily (before breakfast). PREMPRO 0.625-2.5 mg per tablet Generic drug:  estrogen (conjugated)-medroxyPROGESTERone Take 1 Tab by mouth daily. We Performed the Following CBC WITH AUTOMATED DIFF (HDL ONLY) [14771 CPT(R)] CRP, HIGH SENSITIVITY (HDL ONLY) [88108 CPT(R)] FIBRINOGEN (HDL ONLY) [id number] CPT(R)] HEMOGLOBIN A1C (HDL ONLY) [78871 CPT(R)] INSULIN (HDL ONLY) [id number] CPT(R)] LEPTIN (HDL ONLY) [[id number] Custom] LIPID PANEL (HDL ONLY) [96163 CPT(R)] MAGNESIUM (HDL ONLY) [id number] CPT(R)] METABOLIC PANEL, COMPREHENSIVE (HDL ONLY) [27128 CPT(R)] MYELOPEROXIDASE, AB (HDL ONLY) K926037 CPT(R)] TSH, 3RD GENERATION (HDL ONLY) [id number] CPT(R)] URIC ACID (HDL ONLY) [id number] CPT(R)] URINALYSIS, COMPLETE, HDL ONLY [[id number] Custom] VITAMIN D, 25 HYDROXY (HDL ONLY) [76803 CPT(R)] Follow-up Instructions Return in about 4 weeks (around 3/14/2017). Patient Instructions Health Maintenance Due Topic Date Due  
 DTaP/Tdap/Td series (1 - Tdap) 08/22/2000  PAP AKA CERVICAL CYTOLOGY  08/22/2000  INFLUENZA AGE 9 TO ADULT  08/01/2016 Goals 
- continue with 4 meal replacements a day. - continue with 5 work outs a week. Weight loss goal for 4 weeks:  8   Lbs. You can do it!!! Body Mass Index: Care Instructions Your Care Instructions Body mass index (BMI) can help you see if your weight is raising your risk for health problems. It uses a formula to compare how much you weigh with how tall you are. A BMI between 18.5 and 24.9 is considered healthy. A BMI between 25 and 29.9 is considered overweight. A BMI of 30 or higher is considered obese. If your BMI is in the normal range, it means that you have a lower risk for weight-related health problems. If your BMI is in the overweight or obese range, you may be at increased risk for weight-related health problems, such as high blood pressure, heart disease, stroke, arthritis or joint pain, and diabetes. BMI is just one measure of your risk for weight-related health problems. You may be at higher risk for health problems if you are not active, you eat an unhealthy diet, or you drink too much alcohol or use tobacco products. Follow-up care is a key part of your treatment and safety. Be sure to make and go to all appointments, and call your doctor if you are having problems. It's also a good idea to know your test results and keep a list of the medicines you take. How can you care for yourself at home? · Practice healthy eating habits. This includes eating plenty of fruits, vegetables, whole grains, lean protein, and low-fat dairy. · Get at least 30 minutes of exercise 5 days a week or more. Brisk walking is a good choice. You also may want to do other activities, such as running, swimming, cycling, or playing tennis or team sports. · Do not smoke. Smoking can increase your risk for health problems. If you need help quitting, talk to your doctor about stop-smoking programs and medicines. These can increase your chances of quitting for good. · Limit alcohol to 2 drinks a day for men and 1 drink a day for women. Too much alcohol can cause health problems. If you have a BMI higher than 25 · Your doctor may do other tests to check your risk for weight-related health problems. This may include measuring the distance around your waist. A waist measurement of more than 40 inches in men or 35 inches in women can increase the risk of weight-related health problems. · Talk with your doctor about steps you can take to stay healthy or improve your health. You may need to make lifestyle changes to lose weight and stay healthy, such as changing your diet and getting regular exercise. Where can you learn more? Go to http://shirley-pawan.info/. Enter S176 in the search box to learn more about \"Body Mass Index: Care Instructions. \" Current as of: February 16, 2016 Content Version: 11.1 © 2927-5862 [employer]. Care instructions adapted under license by [employer] (which disclaims liability or warranty for this information). If you have questions about a medical condition or this instruction, always ask your healthcare professional. [address] any warranty or liability for your use of this information. Patient Instructions History Introducing [facility]! Dear Denis Razo: Thank you for requesting a [employer] account. Our records indicate that you already have an active [employer] account. You can access your account anytime at [email or web address] Did you know that you can access your hospital and ER discharge instructions at any time in [employer]? You can also review all of your test results from your hospital stay or ER visit. Additional Information If you have questions, please visit the Frequently Asked Questions section of the [employer] website at [email or web address] Remember, [employer] is NOT to be used for urgent needs. For medical emergencies, dial 911. Now available from your iPhone and Android! Please provide this summary of care documentation to your next provider. Your primary care clinician is listed as [de-identified] M MIRANDA. If you have any questions after today's visit, please call [phone].

## 2017-02-22 ENCOUNTER — CLINICAL SUPPORT (OUTPATIENT)
Dept: FAMILY MEDICINE CLINIC | Age: 37
End: 2017-02-22

## 2017-02-22 VITALS
HEART RATE: 75 BPM | WEIGHT: 213.2 LBS | DIASTOLIC BLOOD PRESSURE: 72 MMHG | HEIGHT: 65 IN | SYSTOLIC BLOOD PRESSURE: 104 MMHG | BODY MASS INDEX: 35.52 KG/M2

## 2017-02-22 DIAGNOSIS — E66.9 OBESITY (BMI 30-39.9): Primary | ICD-10-CM

## 2017-02-22 NOTE — PROGRESS NOTES
Progress Note: Weekly Education Class in the [facility] Loss Program   Is there anything that you or the patient needs to let the [address] [facility] Physician know about? no  Over the past week, have you experienced any side-effects? no    Karely Mandel is a 39 y.o. female who is enrolled in [facility] Loss Program    Visit Vitals    /72 (BP 1 Location: Left arm, BP Patient Position: Sitting)    Pulse 75    Ht 5' 5\" (1.651 m)    Wt 213 lb 3.2 oz (96.7 kg)    BMI 35.48 kg/m2     Weight Metrics 2/22/2017 2/14/2017 2/14/2017 2/8/2017 2/1/2017 1/25/2017 1/18/2017   Weight 213 lb 3.2 oz - 216 lb 14.4 oz 216 lb 6.4 oz 218 lb 6.4 oz 219 lb 9.6 oz -   Waist Measure Inches 37 39 - 39 36.5 39 40   Exercise Mins/week - 150 - - - - -   Body Fat % - 40.1 - - - - -   BMI 35.48 kg/m2 - 36.09 kg/m2 36.01 kg/m2 36.34 kg/m2 36.54 kg/m2 -         Have you received any other medical care this week? no  If yes, where and for what? Have you had any change in your medications since your last visit? no  If yes what? Did you have any problems adhering to the program last week? no  If yes, please explain:       Eating Habits Over Last Week:  Did you take in 64 oz of non-caloric fluids? yes     Did you consume your 4 meal replacements each day?  yes       Physical Activity Over the Past Week:    Aerobic exercise: 150 min  Resistance exercise: 120 min workouts / week

## 2017-02-27 NOTE — PROGRESS NOTES
Nurse note from patient's weekly VLCD / LCD / Maintenance class was reviewed. Pertinent medical concerns were:  None noted. Vitals 2/22/2017 2/14/2017 2/8/2017 2/1/2017   Weight 213 lb 3.2 oz 216 lb 14.4 oz 216 lb 6.4 oz 218 lb 6.4 oz     Vitals 1/25/2017 1/18/2017 1/4/2017 11/15/2016   Weight 219 lb 9.6 oz 224 lb 3.2 oz 221 lb 12.8 oz 212 lb     Continue current regimen.

## 2017-03-01 ENCOUNTER — CLINICAL SUPPORT (OUTPATIENT)
Dept: FAMILY MEDICINE CLINIC | Age: 37
End: 2017-03-01

## 2017-03-01 VITALS
HEIGHT: 65 IN | HEART RATE: 68 BPM | WEIGHT: 211.2 LBS | SYSTOLIC BLOOD PRESSURE: 118 MMHG | DIASTOLIC BLOOD PRESSURE: 75 MMHG | BODY MASS INDEX: 35.19 KG/M2

## 2017-03-01 DIAGNOSIS — E66.9 OBESITY (BMI 30-39.9): Primary | ICD-10-CM

## 2017-03-01 NOTE — PROGRESS NOTES
Progress Note: Weekly Education Class in the [facility] Weight Loss Program   Is there anything that you or the patient needs to let the [address] [facility] Physician know about? no  Over the past week, have you experienced any side-effects? no    Nadiya Randall is a 39 y.o. female who is enrolled in [facility] Loss [facility]    Visit Vitals    /75 (BP 1 Location: Left arm, BP Patient Position: Sitting)    Pulse 68    Ht 5' 5\" (1.651 m)    Wt 211 lb 3.2 oz (95.8 kg)    BMI 35.15 kg/m2     Weight Metrics 3/1/2017 2/22/2017 2/14/2017 2/14/2017 2/8/2017 2/1/2017 1/25/2017   Weight 211 lb 3.2 oz 213 lb 3.2 oz - 216 lb 14.4 oz 216 lb 6.4 oz 218 lb 6.4 oz 219 lb 9.6 oz   Waist Measure Inches 36 37 39 - 39 36.5 39   Exercise Mins/week - - 150 - - - -   Body Fat % - - 40.1 - - - -   BMI 35.15 kg/m2 35.48 kg/m2 - 36.09 kg/m2 36.01 kg/m2 36.34 kg/m2 36.54 kg/m2         Have you received any other medical care this week? no  If yes, where and for what? Have you had any change in your medications since your last visit? no  If yes what? Did you have any problems adhering to the program last week? no  If yes, please explain:       Eating Habits Over Last Week:  Did you take in 64 oz of non-caloric fluids? yes     Did you consume your 4 meal replacements each day?  yes       Physical Activity Over the Past Week:    Aerobic exercise: 150 min  Resistance exercise: 150 min workouts / week

## 2017-03-08 ENCOUNTER — CLINICAL SUPPORT (OUTPATIENT)
Dept: FAMILY MEDICINE CLINIC | Age: 37
End: 2017-03-08

## 2017-03-08 VITALS
DIASTOLIC BLOOD PRESSURE: 73 MMHG | WEIGHT: 209 LBS | SYSTOLIC BLOOD PRESSURE: 115 MMHG | HEIGHT: 65 IN | BODY MASS INDEX: 34.82 KG/M2 | HEART RATE: 75 BPM

## 2017-03-08 DIAGNOSIS — E66.9 OBESITY (BMI 30-39.9): Primary | ICD-10-CM

## 2017-03-14 ENCOUNTER — OFFICE VISIT (OUTPATIENT)
Dept: INTERNAL MEDICINE CLINIC | Age: 37
End: 2017-03-14

## 2017-03-14 VITALS
OXYGEN SATURATION: 100 % | BODY MASS INDEX: 34.74 KG/M2 | RESPIRATION RATE: 14 BRPM | HEIGHT: 65 IN | HEART RATE: 91 BPM | DIASTOLIC BLOOD PRESSURE: 75 MMHG | WEIGHT: 208.5 LBS | TEMPERATURE: 100 F | SYSTOLIC BLOOD PRESSURE: 118 MMHG

## 2017-03-14 DIAGNOSIS — E66.9 OBESITY (BMI 30-39.9): Primary | ICD-10-CM

## 2017-03-14 DIAGNOSIS — J10.1 INFLUENZA B: ICD-10-CM

## 2017-03-14 DIAGNOSIS — K59.00 CONSTIPATION, UNSPECIFIED CONSTIPATION TYPE: ICD-10-CM

## 2017-03-14 LAB
QUICKVUE INFLUENZA TEST: POSITIVE
VALID INTERNAL CONTROL?: YES

## 2017-03-14 RX ORDER — ERYTHROMYCIN 5 MG/G
OINTMENT OPHTHALMIC
Refills: 0 | COMMUNITY
Start: 2017-03-11 | End: 2017-04-18 | Stop reason: ALTCHOICE

## 2017-03-14 RX ORDER — OSELTAMIVIR PHOSPHATE 75 MG/1
75 CAPSULE ORAL 2 TIMES DAILY
Qty: 10 CAP | Refills: 0 | Status: SHIPPED | OUTPATIENT
Start: 2017-03-14 | End: 2017-03-19

## 2017-03-14 NOTE — PROGRESS NOTES
[facility] Progress Note:   F/up Physician Visit    CC: Bradley Cobian is a 39 y.o. female who is here for her  f/up physician visit for the VLCD Program. Nilesh Al has completed 9 weeks of the program to date. She has lost 8 lbs in the last 4 weeks.      Hypothyroidism: continues on synthroid 137mcg. Follows with [facility] endocrinology. Constipation: she has been using miralax as need for constipation, then used this too much last week, so she has been using this less often. She has been suing this every other day now. Her children have been sick with the flu for the last week. Her daughter was treated with tamiflu this weekend. They were not tested for the flu but treated as if they did. She tells me that she got a cold 2 weeks ago. She got pink eye last week, treated with erythromycin ointment for this. Now has been having congestion, aching, low grade fever for the last 2 days. No CP, SOB, no cough. She does have a history of asthma.      Starting weight: 4/15/15 at 298 lbs  Current weight: 208 lbs  Goal weight: 170 lbs      Most recent EKG: 10/14/15 at 194lbs    Weight Metrics 3/14/2017 3/8/2017 3/1/2017 2/22/2017 2/14/2017 2/14/2017 2/8/2017   Weight 208 lb 8 oz 209 lb 211 lb 3.2 oz 213 lb 3.2 oz - 216 lb 14.4 oz 216 lb 6.4 oz   Waist Measure Inches 39 36 36 37 39 - 39   Exercise Mins/week 0 - - - 150 - -   Body Fat % 38.9 - - - 40.1 - -   BMI 34.7 kg/m2 34.78 kg/m2 35.15 kg/m2 35.48 kg/m2 - 36.09 kg/m2 36.01 kg/m2         Current Outpatient Prescriptions   Medication Sig Dispense Refill    levothyroxine (SYNTHROID) 137 mcg tablet Take 137 mcg by mouth Daily (before breakfast).  Biotin-Silicon Diox-L-Cysteine 5,000 mcg-100 mg- 50 mg tab Take 5,000 mcg by mouth two (2) times a day.  fluticasone-salmeterol (ADVAIR DISKUS) 250-50 mcg/dose diskus inhaler Take 1 Puff by inhalation every twelve (12) hours as needed.       albuterol (PROVENTIL VENTOLIN) 2.5 mg /3 mL (0.083 %) nebulizer solution by Nebulization route once. Indications: ACUTE ASTHMA ATTACK      estrogen, conjugated,-medroxyPROGESTERone (PREMPRO) 0.625-2.5 mg per tablet Take 1 Tab by mouth daily.  erythromycin (ILOTYCIN) ophthalmic ointment APPLY TO AFFECTED AREA UP TO 4 TIMES A DAY  0         Participation   Did you attend clinic and class last week? yes    Review of Systems  Since your last visit, have you experienced any complications? no  If yes, please list:     No CP, SOB, palpitations, lightheadedness, dizziness, constipation. Positives highlight in BOLD. Are you taking an appetite suppressant? no  If so, is there any Chest Pain, Palpitations or Dizziness? BP Readings from Last 10 Encounters:   03/14/17 118/75   03/08/17 115/73   03/01/17 118/75   02/22/17 104/72   02/14/17 113/68   02/08/17 113/75   02/01/17 111/74   01/25/17 115/74   01/18/17 110/74   01/04/17 105/70         Have you received any other medical care this week? no  If yes, where and for what? Have you discontinued or changed any medicine or dose of your medicine since your last visit with Dr Zulma Urban? no  If yes, where and for what? Diet  How many ounces of calorie-free liquids did you consume each day?  100 oz    How many meal replacements did you take each day? 4    Did you have any problems adhering to the program?  no If yes, please explain: some constipation      Exercise  Aerobic exercise: 0 min  Resistance exercise: 0 workouts / week  Any discomfort?  no     If yes, where? Review of Systems  Complete ROS negative except where noted above    Objective  Visit Vitals    /75 (BP 1 Location: Left arm, BP Patient Position: Sitting)    Pulse 91    Temp 100 °F (37.8 °C) (Oral)    Resp 14    Ht 5' 5\" (1.651 m)    Wt 208 lb 8 oz (94.6 kg)    SpO2 100%    BMI 34.7 kg/m2     No LMP recorded. Patient is not currently having periods (Reason: Polycystic Ovarian Syndrome). Waist Circumference:  I personally reviewed patient's Weight Management Doc Flowsheet  Neck Circumference: I personally reviewed patient's Weight Management Doc Flowsheet  Percent Body Fat: I personally reviewed patient's Weight Management Doc Flowsheet    Physical Exam  Appearance: well appearing, obese, A&O, NAD  HEENT:  NC/AT, PERRL, No scleral icterus  Heart:  RRR without M/R/G  Lungs:  CTAB, no rhonchi, rales, or wheezes with good air exchange  Abd: soft, non-tender, +BS   Ext:  No LE Edema  Component      Latest Ref Rng & Units 3/14/2017           3:49 PM   VALID INTERNAL CONTROL POC       Yes   QuickVue Influenza test      Negative Positive     Assessment / Plan    Encounter Diagnoses   Name Primary?  Obesity (BMI 30-39. 9) Yes    Flu-like symptoms     Constipation, unspecified constipation type        1. Weight management improved   Progress was reviewed with patient    2. Labs    She has not had labs drawn yet   Lab slip given to pt for f/up HDL labs    3. Hypothyroidism: continues on synthroid 137mcg. She will have her labs drawn this week. 4. Constipation: continue with miralax every other day to help prevent constipation. 5. Influenza B: Rapid influenza B positive today. Symptoms for the last 2 days. Patient does not appear toxic today, vitals stable. Will treat today with tamiflu, discussed medication with patient, she has taken this once before in the past. Discussed serious nature of influenza, risk for complications. Advised her to stay home from work. Advised her to call to follow up with her PCP, she agrees to plan. - hold the VLCD while being treated for flu. - call and follow up with your PCP about your positive flu test.     Weight loss goal for 4 weeks:   8  Lbs. Patient Instructions     Goals  - hold the VLCD while being treated for flu. - call and follow up with your PCP about your positive flu test.     Weight loss goal for 4 weeks:   8  Lbs. You can do it!!!        Body Mass Index: Care Instructions  Your Care Instructions    Body mass index (BMI) can help you see if your weight is raising your risk for health problems. It uses a formula to compare how much you weigh with how tall you are. A BMI between 18.5 and 24.9 is considered healthy. A BMI between 25 and 29.9 is considered overweight. A BMI of 30 or higher is considered obese. If your BMI is in the normal range, it means that you have a lower risk for weight-related health problems. If your BMI is in the overweight or obese range, you may be at increased risk for weight-related health problems, such as high blood pressure, heart disease, stroke, arthritis or joint pain, and diabetes. BMI is just one measure of your risk for weight-related health problems. You may be at higher risk for health problems if you are not active, you eat an unhealthy diet, or you drink too much alcohol or use tobacco products. Follow-up care is a key part of your treatment and safety. Be sure to make and go to all appointments, and call your doctor if you are having problems. It's also a good idea to know your test results and keep a list of the medicines you take. How can you care for yourself at home? · Practice healthy eating habits. This includes eating plenty of fruits, vegetables, whole grains, lean protein, and low-fat dairy. · Get at least 30 minutes of exercise 5 days a week or more. Brisk walking is a good choice. You also may want to do other activities, such as running, swimming, cycling, or playing tennis or team sports. · Do not smoke. Smoking can increase your risk for health problems. If you need help quitting, talk to your doctor about stop-smoking programs and medicines. These can increase your chances of quitting for good. · Limit alcohol to 2 drinks a day for men and 1 drink a day for women. Too much alcohol can cause health problems.   If you have a BMI higher than 25  · Your doctor may do other tests to check your risk for weight-related health problems. This may include measuring the distance around your waist. A waist measurement of more than 40 inches in men or 35 inches in women can increase the risk of weight-related health problems. · Talk with your doctor about steps you can take to stay healthy or improve your health. You may need to make lifestyle changes to lose weight and stay healthy, such as changing your diet and getting regular exercise. Where can you learn more? Go to http://shirley-pawan.info/. Enter S176 in the search box to learn more about \"Body Mass Index: Care Instructions. \"  Current as of: February 16, 2016  Content Version: 11.1  © 6672-6768 [employer]. Care instructions adapted under license by [employer] (which disclaims liability or warranty for this information). If you have questions about a medical condition or this instruction, always ask your healthcare professional. [place] [address] any warranty or liability for your use of this information. Health Maintenance Due   Topic Date Due    DTaP/Tdap/Td series (1 - Tdap) 08/22/2000    PAP AKA CERVICAL CYTOLOGY  08/22/2000    INFLUENZA AGE 9 TO ADULT  08/01/2016             Follow-up Disposition:  Return in about 4 weeks (around 4/11/2017). 10 minutes of the 15 minutes face to face time with Antwon Pollard consisted of counseling & coordinating and/or discussing treatment plans in reference to her obesity. The primary encounter diagnosis was Obesity (BMI 30-39.9). A diagnosis of Flu-like symptoms was also pertinent to this visit. The patient is to follow up as scheduled and will report to the ED or the office if symptoms change or increase. The patient has voiced understanding and will comply.

## 2017-03-14 NOTE — PATIENT INSTRUCTIONS
Goals  - hold the VLCD while being treated for flu. - call and follow up with your PCP about your positive flu test.     Weight loss goal for 4 weeks:   8  Lbs. You can do it!!! Body Mass Index: Care Instructions  Your Care Instructions    Body mass index (BMI) can help you see if your weight is raising your risk for health problems. It uses a formula to compare how much you weigh with how tall you are. A BMI between 18.5 and 24.9 is considered healthy. A BMI between 25 and 29.9 is considered overweight. A BMI of 30 or higher is considered obese. If your BMI is in the normal range, it means that you have a lower risk for weight-related health problems. If your BMI is in the overweight or obese range, you may be at increased risk for weight-related health problems, such as high blood pressure, heart disease, stroke, arthritis or joint pain, and diabetes. BMI is just one measure of your risk for weight-related health problems. You may be at higher risk for health problems if you are not active, you eat an unhealthy diet, or you drink too much alcohol or use tobacco products. Follow-up care is a key part of your treatment and safety. Be sure to make and go to all appointments, and call your doctor if you are having problems. It's also a good idea to know your test results and keep a list of the medicines you take. How can you care for yourself at home? · Practice healthy eating habits. This includes eating plenty of fruits, vegetables, whole grains, lean protein, and low-fat dairy. · Get at least 30 minutes of exercise 5 days a week or more. Brisk walking is a good choice. You also may want to do other activities, such as running, swimming, cycling, or playing tennis or team sports. · Do not smoke. Smoking can increase your risk for health problems. If you need help quitting, talk to your doctor about stop-smoking programs and medicines. These can increase your chances of quitting for good.   · Limit alcohol to 2 drinks a day for men and 1 drink a day for women. Too much alcohol can cause health problems. If you have a BMI higher than 25  · Your doctor may do other tests to check your risk for weight-related health problems. This may include measuring the distance around your waist. A waist measurement of more than 40 inches in men or 35 inches in women can increase the risk of weight-related health problems. · Talk with your doctor about steps you can take to stay healthy or improve your health. You may need to make lifestyle changes to lose weight and stay healthy, such as changing your diet and getting regular exercise. Where can you learn more? Go to http://shirley-pawan.info/. Enter S176 in the search box to learn more about \"Body Mass Index: Care Instructions. \"  Current as of: February 16, 2016  Content Version: 11.1  © 9431-0621 [employer]. Care instructions adapted under license by [employer] (which disclaims liability or warranty for this information). If you have questions about a medical condition or this instruction, always ask your healthcare professional. [place] [address] any warranty or liability for your use of this information.   Health Maintenance Due   Topic Date Due    DTaP/Tdap/Td series (1 - Tdap) 08/22/2000    PAP AKA CERVICAL CYTOLOGY  08/22/2000    INFLUENZA AGE 9 TO ADULT  08/01/2016

## 2017-03-14 NOTE — PROGRESS NOTES
Chief Complaint   Patient presents with    Weight Management     1. Have you been to the ER, urgent care clinic since your last visit? Hospitalized since your last visit? No    2. Have you seen or consulted any other health care providers outside of the [facility] since your last visit? Include any pap smears or colon screening.  No

## 2017-03-14 NOTE — MR AVS SNAPSHOT
Visit Information Date & Time Provider Department Dept. Phone Encounter #  
 3/14/2017  3:30 PM Claudette Siren, [place] Internist of [facility] [id number] Follow-up Instructions Return in about 4 weeks (around 4/11/2017). Your Appointments 3/22/2017  9:84 PM  
METABOLIC PROGRAM 5 with HRMP WEEKLY CLASS TRICITIES  
HR Metabolic Program ([facility] [address]) Appt Note: weekly class HR Metabolic Program ([facility] [address]) [phone]  
  
    
 3/29/2017  0:83 PM  
METABOLIC PROGRAM 5 with HRMP WEEKLY CLASS TRICITIES  
HR Metabolic Program ([address] [place], [place] [address]er Road) Appt Note: weekly class HR Metabolic Program ([facility] [address]) [phone]  
  
    
 4/5/2017  9:15 PM  
METABOLIC PROGRAM 5 with HRMP WEEKLY CLASS TRICITIES  
HR Metabolic Program ([facility] [address]) Appt Note: weekly class HR Metabolic Program ([facility] [address]) [phone] 4/12/2017  2:36 PM  
METABOLIC PROGRAM 5 with HRMP WEEKLY CLASS TRICITIES  
HR Metabolic Program ([address] [place] [address] Road) Appt Note: weekly class HR Metabolic Program ([facility] [address]) [phone] 4/18/2017  6:18 PM  
METABOLIC PROGRAM 15 with Claudette Siren, NP Internist of [facility] ([address] [place] [address]) Appt Note: metabolic program [employer] [address], [address] [place] [address]  
  
   
 [address], [address] Upcoming Health Maintenance Date Due DTaP/Tdap/Td series (1 - Tdap) 8/22/2000 PAP AKA CERVICAL CYTOLOGY 8/22/2000 INFLUENZA AGE 9 TO ADULT 8/1/2016 Allergies as of 3/14/2017  Review Complete On: 3/14/2017 By: Claudette Siren, NP Severity Noted Reaction Type Reactions Sulfa (Sulfonamide Antibiotics)  04/15/2015    Rash Current Immunizations  Never Reviewed No immunizations on file. Not reviewed this visit You Were Diagnosed With   
  
 Codes Comments Obesity (BMI 30-39.9)    -  Primary ICD-10-CM: U12.5 ICD-9-CM: 278.00 Constipation, unspecified constipation type     ICD-10-CM: K59.00 ICD-9-CM: 564.00 Influenza B     ICD-10-CM: J10.1 ICD-9-CM: 487. 1 Vitals BP Pulse Temp Resp Height(growth percentile) Weight(growth percentile) 118/75 (BP 1 Location: Left arm, BP Patient Position: Sitting) 91 100 °F (37.8 °C) (Oral) 14 5' 5\" (1.651 m) 208 lb 8 oz (94.6 kg) SpO2 BMI OB Status Smoking Status 100% 34.7 kg/m2 Polycystic Ovarian Syndrome Never Smoker BMI and BSA Data Body Mass Index Body Surface Area 34.7 kg/m 2 2.08 m 2 Preferred Pharmacy Pharmacy Name Phone [id number] [address] [place] [address] [phone] Your Updated Medication List  
  
   
This list is accurate as of: 3/14/17  3:55 PM.  Always use your most recent med list.  
  
  
  
  
 Hiren Stands 250-50 mcg/dose diskus inhaler Generic drug:  fluticasone-salmeterol Take 1 Puff by inhalation every twelve (12) hours as needed. albuterol 2.5 mg /3 mL (0.083 %) nebulizer solution Commonly known as:  PROVENTIL VENTOLIN  
by Nebulization route once. Indications: ACUTE ASTHMA ATTACK Biotin-Silicon Diox-L-Cysteine 5,000 mcg -100 mg-50 mg Tab Take 5,000 mcg by mouth two (2) times a day. erythromycin ophthalmic ointment Commonly known as:  ILOTYCIN  
APPLY TO AFFECTED AREA UP TO 4 TIMES A DAY  
  
 levothyroxine 137 mcg tablet Commonly known as:  SYNTHROID Take 137 mcg by mouth Daily (before breakfast). oseltamivir 75 mg capsule Commonly known as:  TAMIFLU Take 1 Cap by mouth two (2) times a day for 5 days. PREMPRO 0.625-2.5 mg per tablet Generic drug:  estrogen (conjugated)-medroxyPROGESTERone Take 1 Tab by mouth daily. Prescriptions Sent to Pharmacy  Refills  
 oseltamivir (TAMIFLU) 75 mg capsule 0  
 Sig: Take 1 Cap by mouth two (2) times a day for 5 days. Class: Normal  
 Pharmacy: [address] [place] [address], [facility] #: [phone] Route: Oral  
  
We Performed the Following AMB POC RAPID INFLUENZA TEST [99884 CPT(R)] Follow-up Instructions Return in about 4 weeks (around 4/11/2017). Patient Instructions Goals Weight loss goal for 4 weeks:   8  Lbs. You can do it!!! Body Mass Index: Care Instructions Your Care Instructions Body mass index (BMI) can help you see if your weight is raising your risk for health problems. It uses a formula to compare how much you weigh with how tall you are. A BMI between 18.5 and 24.9 is considered healthy. A BMI between 25 and 29.9 is considered overweight. A BMI of 30 or higher is considered obese. If your BMI is in the normal range, it means that you have a lower risk for weight-related health problems. If your BMI is in the overweight or obese range, you may be at increased risk for weight-related health problems, such as high blood pressure, heart disease, stroke, arthritis or joint pain, and diabetes. BMI is just one measure of your risk for weight-related health problems. You may be at higher risk for health problems if you are not active, you eat an unhealthy diet, or you drink too much alcohol or use tobacco products. Follow-up care is a key part of your treatment and safety. Be sure to make and go to all appointments, and call your doctor if you are having problems. It's also a good idea to know your test results and keep a list of the medicines you take. How can you care for yourself at home? · Practice healthy eating habits. This includes eating plenty of fruits, vegetables, whole grains, lean protein, and low-fat dairy. · Get at least 30 minutes of exercise 5 days a week or more. Brisk walking is a good choice.  You also may want to do other activities, such as running, swimming, cycling, or playing tennis or team sports. · Do not smoke. Smoking can increase your risk for health problems. If you need help quitting, talk to your doctor about stop-smoking programs and medicines. These can increase your chances of quitting for good. · Limit alcohol to 2 drinks a day for men and 1 drink a day for women. Too much alcohol can cause health problems. If you have a BMI higher than 25 · Your doctor may do other tests to check your risk for weight-related health problems. This may include measuring the distance around your waist. A waist measurement of more than 40 inches in men or 35 inches in women can increase the risk of weight-related health problems. · Talk with your doctor about steps you can take to stay healthy or improve your health. You may need to make lifestyle changes to lose weight and stay healthy, such as changing your diet and getting regular exercise. Where can you learn more? Go to http://shirley-pawan.info/. Enter S176 in the search box to learn more about \"Body Mass Index: Care Instructions. \" Current as of: February 16, 2016 Content Version: 11.1 © 8169-1690 [employer]. Care instructions adapted under license by [employer] (which disclaims liability or warranty for this information). If you have questions about a medical condition or this instruction, always ask your healthcare professional. [place] [address] any warranty or liability for your use of this information. Health Maintenance Due Topic Date Due  
 DTaP/Tdap/Td series (1 - Tdap) 08/22/2000  PAP AKA CERVICAL CYTOLOGY  08/22/2000  INFLUENZA AGE 9 TO ADULT  08/01/2016 Introducing [facility] & HE[facility]! Dear Emma Mora: Thank you for requesting a [employer] account. Our records indicate that you already have an active [employer] account. You can access your account anytime at [email or web address] Did you know that you can access your hospital and ER discharge instructions at any time in [employer]? You can also review all of your test results from your hospital stay or ER visit. Additional Information If you have questions, please visit the Frequently Asked Questions section of the [employer] website at [email or web address] Remember, [employer] is NOT to be used for urgent needs. For medical emergencies, dial 911. Now available from your iPhone and Android! Please provide this summary of care documentation to your next provider. Your primary care clinician is listed as [de-identified] M MIRANDA. If you have any questions after today's visit, please call [phone].

## 2017-03-22 ENCOUNTER — CLINICAL SUPPORT (OUTPATIENT)
Dept: FAMILY MEDICINE CLINIC | Age: 37
End: 2017-03-22

## 2017-03-22 VITALS
DIASTOLIC BLOOD PRESSURE: 72 MMHG | HEART RATE: 73 BPM | BODY MASS INDEX: 34.49 KG/M2 | SYSTOLIC BLOOD PRESSURE: 110 MMHG | HEIGHT: 65 IN | WEIGHT: 207 LBS

## 2017-03-22 DIAGNOSIS — E66.9 OBESITY (BMI 30-39.9): Primary | ICD-10-CM

## 2017-03-22 NOTE — PROGRESS NOTES
Progress Note: Weekly Education Class in the [facility] Weight Loss Program   Is there anything that you or the patient needs to let the [facility] Physician know about? no  Over the past week, have you experienced any side-effects? no    Gail Chavez is a 39 y.o. female who is enrolled in [facility] Weight Loss Program    Visit Vitals    /72 (BP 1 Location: Left arm, BP Patient Position: Sitting)    Pulse 73    Ht 5' 5\" (1.651 m)    Wt 207 lb (93.9 kg)    BMI 34.45 kg/m2     Weight Metrics 3/22/2017 3/14/2017 3/8/2017 3/1/2017 2/22/2017 2/14/2017 2/14/2017   Weight 207 lb 208 lb 8 oz 209 lb 211 lb 3.2 oz 213 lb 3.2 oz - 216 lb 14.4 oz   Waist Measure Inches 38 39 36 36 37 39 -   Exercise Mins/week - 0 - - - 150 -   Body Fat % - 38.9 - - - 40.1 -   BMI 34.45 kg/m2 34.7 kg/m2 34.78 kg/m2 35.15 kg/m2 35.48 kg/m2 - 36.09 kg/m2         Have you received any other medical care this week? no  If yes, where and for what? Have you had any change in your medications since your last visit? no  If yes what? Did you have any problems adhering to the program last week? no  If yes, please explain:       Eating Habits Over Last Week:  Did you take in 64 oz of non-caloric fluids? yes     Did you consume your 4 meal replacements each day?  yes       Physical Activity Over the Past Week:    Aerobic exercise: 90 min  Resistance exercise: 90 min workouts / week

## 2017-03-29 ENCOUNTER — CLINICAL SUPPORT (OUTPATIENT)
Dept: FAMILY MEDICINE CLINIC | Age: 37
End: 2017-03-29

## 2017-03-29 VITALS
HEART RATE: 68 BPM | HEIGHT: 65 IN | SYSTOLIC BLOOD PRESSURE: 113 MMHG | DIASTOLIC BLOOD PRESSURE: 74 MMHG | WEIGHT: 207 LBS | BODY MASS INDEX: 34.49 KG/M2

## 2017-03-29 DIAGNOSIS — E66.9 OBESITY (BMI 30-39.9): Primary | ICD-10-CM

## 2017-03-29 NOTE — PROGRESS NOTES
Progress Note: Weekly Education Class in the [facility] Weight Loss Program   Is there anything that you or the patient needs to let the [facility] Physician know about? no  Over the past week, have you experienced any side-effects? no    Neftali Barrientos is a 39 y.o. female who is enrolled in [facility] Weight Loss Program    Visit Vitals    /74 (BP 1 Location: Left arm, BP Patient Position: Sitting)    Pulse 68    Ht 5' 5\" (1.651 m)    Wt 207 lb (93.9 kg)    BMI 34.45 kg/m2     Weight Metrics 3/29/2017 3/22/2017 3/14/2017 3/8/2017 3/1/2017 2/22/2017 2/14/2017   Weight 207 lb 207 lb 208 lb 8 oz 209 lb 211 lb 3.2 oz 213 lb 3.2 oz -   Waist Measure Inches 39 38 39 36 36 37 39   Exercise Mins/week - - 0 - - - 150   Body Fat % - - 38.9 - - - 40.1   BMI 34.45 kg/m2 34.45 kg/m2 34.7 kg/m2 34.78 kg/m2 35.15 kg/m2 35.48 kg/m2 -         Have you received any other medical care this week? no  If yes, where and for what? Have you had any change in your medications since your last visit? no  If yes what? Did you have any problems adhering to the program last week? no  If yes, please explain:       Eating Habits Over Last Week:  Did you take in 64 oz of non-caloric fluids? yes     Did you consume your 4 meal replacements each day?  yes       Physical Activity Over the Past Week:    Aerobic exercise: 150 min  Resistance exercise: 120 min workouts / week

## 2017-03-31 ENCOUNTER — TELEPHONE (OUTPATIENT)
Dept: INTERNAL MEDICINE CLINIC | Age: 37
End: 2017-03-31

## 2017-04-01 LAB
% ALBUMIN, 58A: 61 % (ref 54–71)
25(OH)D3 SERPL-MCNC: 56 NG/ML (ref 30–100)
ABSOLUTE IMMATURE GRANULOCYTES, AIG: 0 X10E3/UL (ref 0–0.1)
ALB/GLOBRATIO, 58C: 1.58 (ref 1.15–2.5)
ALBUMIN SERPL-MCNC: 4.4 G/DL (ref 3.7–5.1)
ALP SERPL-CCNC: 59 U/L (ref 35–125)
ALT SERPL-CCNC: 20 U/L
ANION GAP SERPL CALC-SCNC: 10 MMOL/L (ref 6–18)
AST SERPL W P-5'-P-CCNC: 19 U/L (ref 5–32)
BASOPHILS # BLD: 1 % (ref 0–3)
BASOPHILS NFR BLD: 0 X10E3/UL (ref 0–0.2)
BILIRUB SERPL-MCNC: 0.5 MG/DL
BUN SERPL-MCNC: 17 MG/DL (ref 6–20)
BUN/CREATININE RATIO,BUCR: 21 (ref 10–27)
CALCIUM SERPL-MCNC: 9.4 MG/DL (ref 8.8–10.5)
CHLORIDE SERPL-SCNC: 104 MMOL/L (ref 98–110)
CHOLEST SERPL-MCNC: 192 MG/DL
CO2 SERPL-SCNC: 27 MMOL/L (ref 19–31)
CREAT SERPL-MCNC: 0.8 MG/DL (ref 0.5–0.9)
CRP SERPL HS-MCNC: 0.8 MG/L
EOSINOPHIL # BLD: 1 % (ref 0–7)
EOSINOPHIL NFR BLD: 0.1 X10E3/UL (ref 0–0.4)
ERYTHROCYTE [DISTWIDTH] IN BLOOD BY AUTOMATED COUNT: 13.1 % (ref 11.7–15)
ESTIMATED AVERAGE GLUCOSE, EAG: 99.7 MG/DL
FASTING-Y/N/HRS: 12
FIBRINOGEN ANTIGEN, 117051: 460 MG/DL (ref 126–437)
GLOBCALC, 58B: 2.8 G/DL (ref 1.9–3.5)
GLUCOSE SERPL-MCNC: 88 MG/DL (ref 70–99)
GRANULOCYTES,GRANS: 53 % (ref 40–74)
HCT VFR BLD AUTO: 43 % (ref 34–44)
HDLC SERPL-MCNC: 58 MG/DL
HGB BLD-MCNC: 13.9 G/DL (ref 11.5–15)
HGBA1C-T, HDL6300: 5.1 %
INSULIN,INS: 10 UU/ML (ref 3–9)
LDLC SERPL CALC-MCNC: 123 MG/DL
LYMPHOCYTES # BLD: 1.7 X10E3/UL (ref 0.7–4.5)
LYMPHOCYTES NFR BLD: 40 % (ref 14–46)
Lab: 0
Lab: 1.02 (ref 1–1.03)
Lab: 6
Lab: ABNORMAL
Lab: CLEAR
Lab: PRESENT
Lab: YELLOW
MAGNESIUM SERPL-MCNC: 2.2 MG/DL (ref 1.6–2.4)
MCH RBC QN AUTO: 28 PG (ref 27–34)
MCHC RBC AUTO-ENTMCNC: 33 G/DL (ref 32–36)
MCV RBC AUTO: 87 FL (ref 80–98)
MONOCYTES # BLD: 0.2 X10E3/UL (ref 0.1–1)
MONOCYTES NFR BLD: 5 % (ref 4–13)
MPOAU: 205 PMOL/L
NEUTROPHILS # BLD AUTO: 2.3 X10E3/UL (ref 1.8–7.8)
NON-HDL CHOLESTEROL, 011976: 134 MG/DL
PLATELET # BLD AUTO: 187 X10E3/UL (ref 140–415)
POTASSIUM SERPL-SCNC: 4.2 MMOL/L (ref 3.5–5.3)
PROT SERPL-MCNC: 7.2 G/DL (ref 6.1–8)
RBC # BLD AUTO: 4.9 X10E6/UL (ref 3.8–5.1)
SODIUM SERPL-SCNC: 141 MMOL/L (ref 133–145)
TRIGL SERPL-MCNC: 59 MG/DL (ref ?–150)
TSH SERPL-ACNC: 1.02 UIU/ML (ref 0.27–4.2)
URATE SERPL-MCNC: 3.4 MG/DL (ref 2–6.9)
WBC # BLD AUTO: 4.4 X10E3/UL (ref 4–10.5)

## 2017-04-02 NOTE — PROGRESS NOTES
Nurse note from patient's weekly VLCD / LCD / Maintenance class was reviewed. Pertinent medical concerns were:  None noted.

## 2017-04-03 LAB
FASTING-Y/N/HRS: 12
LEPTIN, SERUM, 146711: 33 NG/ML

## 2017-04-12 ENCOUNTER — TELEPHONE (OUTPATIENT)
Dept: INTERNAL MEDICINE CLINIC | Age: 37
End: 2017-04-12

## 2017-04-12 ENCOUNTER — CLINICAL SUPPORT (OUTPATIENT)
Dept: FAMILY MEDICINE CLINIC | Age: 37
End: 2017-04-12

## 2017-04-12 VITALS
BODY MASS INDEX: 34.29 KG/M2 | SYSTOLIC BLOOD PRESSURE: 113 MMHG | HEART RATE: 69 BPM | WEIGHT: 205.8 LBS | HEIGHT: 65 IN | DIASTOLIC BLOOD PRESSURE: 75 MMHG

## 2017-04-12 DIAGNOSIS — E66.9 OBESITY (BMI 30-39.9): Primary | ICD-10-CM

## 2017-04-12 NOTE — TELEPHONE ENCOUNTER
Pt has physical appt today at 1pm with her PCP Martha Valles, Can we fax over most recent lab results foe this appt

## 2017-04-12 NOTE — PROGRESS NOTES
Progress Note: Weekly Education Class in the [facility] Weight Loss Program   Is there anything that you or the patient needs to let the [facility] Physician know about? no  Over the past week, have you experienced any side-effects? no    Christopher Lorenzo is a 39 y.o. female who is enrolled in [facility] Loss Program    Visit Vitals    /75 (BP 1 Location: Left arm, BP Patient Position: Sitting)    Pulse 69    Ht 5' 5\" (1.651 m)    Wt 205 lb 12.8 oz (93.4 kg)    BMI 34.25 kg/m2     Weight Metrics 4/12/2017 4/12/2017 3/29/2017 3/22/2017 3/14/2017 3/8/2017 3/1/2017   Weight - 205 lb 12.8 oz 207 lb 207 lb 208 lb 8 oz 209 lb 211 lb 3.2 oz   Waist Measure Inches 37 - 39 38 39 36 36   Exercise Mins/week - - - - 0 - -   Body Fat % - - - - 38.9 - -   BMI - 34.25 kg/m2 34.45 kg/m2 34.45 kg/m2 34.7 kg/m2 34.78 kg/m2 35.15 kg/m2         Have you received any other medical care this week? yes  If yes, where and for what? PCP for a Physical    Have you had any change in your medications since your last visit? no  If yes what? Did you have any problems adhering to the program last week? no  If yes, please explain:       Eating Habits Over Last Week:  Did you take in 64 oz of non-caloric fluids? yes     Did you consume your 4 meal replacements each day?  yes       Physical Activity Over the Past Week:    Aerobic exercise: 210 min  Resistance exercise: 150 min workouts / week

## 2017-04-18 ENCOUNTER — OFFICE VISIT (OUTPATIENT)
Dept: INTERNAL MEDICINE CLINIC | Age: 37
End: 2017-04-18

## 2017-04-18 VITALS
SYSTOLIC BLOOD PRESSURE: 118 MMHG | BODY MASS INDEX: 34.91 KG/M2 | DIASTOLIC BLOOD PRESSURE: 72 MMHG | OXYGEN SATURATION: 98 % | WEIGHT: 209.5 LBS | HEIGHT: 65 IN | HEART RATE: 62 BPM | RESPIRATION RATE: 18 BRPM | TEMPERATURE: 98.1 F

## 2017-04-18 DIAGNOSIS — E66.9 OBESITY (BMI 30-39.9): Primary | ICD-10-CM

## 2017-04-18 DIAGNOSIS — E03.9 ACQUIRED HYPOTHYROIDISM: ICD-10-CM

## 2017-04-18 NOTE — MR AVS SNAPSHOT
Visit Information Date & Time Provider Department Dept. Phone Encounter #  
 4/18/2017  3:30 PM Camryn Schmid, [facility] Internist of [address] [id number] Follow-up Instructions Return in about 4 weeks (around 5/16/2017). Upcoming Health Maintenance Date Due DTaP/Tdap/Td series (1 - Tdap) 8/22/2000 PAP AKA CERVICAL CYTOLOGY 8/22/2000 INFLUENZA AGE 9 TO ADULT 8/1/2016 Allergies as of 4/18/2017  Review Complete On: 4/18/2017 By: Camryn Schmid NP Severity Noted Reaction Type Reactions Sulfa (Sulfonamide Antibiotics)  04/15/2015    Rash Current Immunizations  Never Reviewed No immunizations on file. Not reviewed this visit You Were Diagnosed With   
  
 Codes Comments Obesity (BMI 30-39.9)    -  Primary ICD-10-CM: K38.4 ICD-9-CM: 278.00 Acquired hypothyroidism     ICD-10-CM: E03.9 ICD-9-CM: 488. 9 Vitals BP Pulse Temp Resp Height(growth percentile) Weight(growth percentile) 118/72 (BP 1 Location: Left arm, BP Patient Position: Sitting) 62 98.1 °F (36.7 °C) (Oral) 18 5' 5\" (1.651 m) 209 lb 8 oz (95 kg) SpO2 BMI OB Status Smoking Status 98% 34.86 kg/m2 Polycystic Ovarian Syndrome Never Smoker BMI and BSA Data Body Mass Index Body Surface Area 34.86 kg/m 2 2.09 m 2 Preferred Pharmacy Pharmacy Name Phone [id number] [address] [place] [address] [phone] Your Updated Medication List  
  
   
This list is accurate as of: 4/18/17  4:05 PM.  Always use your most recent med list.  
  
  
  
  
 Cletis Martinvalerio 250-50 mcg/dose diskus inhaler Generic drug:  fluticasone-salmeterol Take 1 Puff by inhalation every twelve (12) hours as needed. albuterol 2.5 mg /3 mL (0.083 %) nebulizer solution Commonly known as:  PROVENTIL VENTOLIN  
by Nebulization route once.  Indications: ACUTE ASTHMA ATTACK  
  
 Biotin-Silicon Diox-L-Cysteine 5,000 mcg -100 mg-50 mg Tab Take 5,000 mcg by mouth two (2) times a day. levothyroxine 137 mcg tablet Commonly known as:  SYNTHROID Take 137 mcg by mouth Daily (before breakfast). PREMPRO 0.625-2.5 mg per tablet Generic drug:  estrogen (conjugated)-medroxyPROGESTERone Take 1 Tab by mouth daily. Follow-up Instructions Return in about 4 weeks (around 5/16/2017). To-Do List   
 05/02/2017 Lab:  CRP, HIGH SENSITIVITY (HDL ONLY) 05/02/2017 Lab:  FIBRINOGEN (HDL ONLY) 05/02/2017 Lab:  INSULIN (HDL ONLY) 05/02/2017 Lab:  MAGNESIUM (HDL ONLY) 05/02/2017 Lab:  METABOLIC PANEL, COMPREHENSIVE (HDL ONLY) 05/02/2017 Lab:  TSH, 3RD GENERATION (HDL ONLY) 05/02/2017 Lab:  URIC ACID (HDL ONLY) 05/02/2017 Lab:  VITAMIN D, 25 HYDROXY (HDL ONLY) Patient Instructions Health Maintenance Due Topic Date Due  
 DTaP/Tdap/Td series (1 - Tdap) 08/22/2000  PAP AKA CERVICAL CYTOLOGY  08/22/2000  INFLUENZA AGE 9 TO ADULT  08/01/2016 Goals 
- stick with it!! 
 
Weight loss goal for 4 weeks:   8  Lbs. You can do it!!! Body Mass Index: Care Instructions Your Care Instructions Body mass index (BMI) can help you see if your weight is raising your risk for health problems. It uses a formula to compare how much you weigh with how tall you are. · A BMI lower than 18.5 is considered underweight. · A BMI between 18.5 and 24.9 is considered healthy. · A BMI between 25 and 29.9 is considered overweight. A BMI of 30 or higher is considered obese. If your BMI is in the normal range, it means that you have a lower risk for weight-related health problems.  If your BMI is in the overweight or obese range, you may be at increased risk for weight-related health problems, such as high blood pressure, heart disease, stroke, arthritis or joint pain, and diabetes. If your BMI is in the underweight range, you may be at increased risk for health problems such as fatigue, lower protection (immunity) against illness, muscle loss, bone loss, hair loss, and hormone problems. BMI is just one measure of your risk for weight-related health problems. You may be at higher risk for health problems if you are not active, you eat an unhealthy diet, or you drink too much alcohol or use tobacco products. Follow-up care is a key part of your treatment and safety. Be sure to make and go to all appointments, and call your doctor if you are having problems. It's also a good idea to know your test results and keep a list of the medicines you take. How can you care for yourself at home? · Practice healthy eating habits. This includes eating plenty of fruits, vegetables, whole grains, lean protein, and low-fat dairy. · If your doctor recommends it, get more exercise. Walking is a good choice. Bit by bit, increase the amount you walk every day. Try for at least 30 minutes on most days of the week. · Do not smoke. Smoking can increase your risk for health problems. If you need help quitting, talk to your doctor about stop-smoking programs and medicines. These can increase your chances of quitting for good. · Limit alcohol to 2 drinks a day for men and 1 drink a day for women. Too much alcohol can cause health problems. If you have a BMI higher than 25 · Your doctor may do other tests to check your risk for weight-related health problems. This may include measuring the distance around your waist. A waist measurement of more than 40 inches in men or 35 inches in women can increase the risk of weight-related health problems. · Talk with your doctor about steps you can take to stay healthy or improve your health. You may need to make lifestyle changes to lose weight and stay healthy, such as changing your diet and getting regular exercise. If you have a BMI lower than 18.5 · Your doctor may do other tests to check your risk for health problems. · Talk with your doctor about steps you can take to stay healthy or improve your health. You may need to make lifestyle changes to gain or maintain weight and stay healthy, such as getting more healthy foods in your diet and doing exercises to build muscle. Where can you learn more? Go to http://shirley-pawan.info/. Enter S176 in the search box to learn more about \"Body Mass Index: Care Instructions. \" Current as of: January 23, 2017 Content Version: 11.2 © 4499-0744 [employer] Top-Level Domain. Care instructions adapted under license by [employer] (which disclaims liability or warranty for this information). If you have questions about a medical condition or this instruction, always ask your healthcare professional. [address] any warranty or liability for your use of this information. Introducing [facility]! Dear Rebecca Stauffer: Thank you for requesting a IronPlanet account. Our records indicate that you already have an active IronPlanet account. You can access your account anytime at https://[email or web address]. [employer] Did you know that you can access your hospital and ER discharge instructions at any time in IronPlanet? You can also review all of your test results from your hospital stay or ER visit. Additional Information If you have questions, please visit the Frequently Asked Questions section of the IronPlanet website at [email or web address]. [employer]/. Remember, IronPlanet is NOT to be used for urgent needs. For medical emergencies, dial 911. Now available from your iPhone and Android! Please provide this summary of care documentation to your next provider. Your primary care clinician is listed as [de-identified] M MIRANDA. If you have any questions after today's visit, please call [phone].

## 2017-04-18 NOTE — PROGRESS NOTES
[facility] Progress Note:   F/up Physician Visit    CC: Obesity      Kina Mckinney is a 39 y.o. female who is here for her  f/up physician visit for the VLCD Program. Mary Gonzalez has completed 14 weeks of the program to date. She has gained 1 lbs since our last visit. She admits she did eat off the program for [employer]. She feels bad because she feels she had been doing good before this, had gotten down to 205lbs. She was also sick with the flu last month. She has fully recovered from this now. She is now back on track. She is back at the gym everyday, she does the treadmill and weights.       Starting weight: 4/15/15 at 298 lbs  Current weight: 209 lbs  Goal weight: 170 lbs      Most recent EKG: 10/14/15 at 194lbs    Weight Metrics 4/18/2017 4/18/2017 4/12/2017 4/12/2017 3/29/2017 3/22/2017 3/14/2017   Weight - 209 lb 8 oz - 205 lb 12.8 oz 207 lb 207 lb 208 lb 8 oz   Waist Measure Inches 36 - 37 - 39 38 39   Exercise Mins/week 180 - - - - - 0   Body Fat % 39.1 - - - - - 38.9   BMI - 34.86 kg/m2 - 34.25 kg/m2 34.45 kg/m2 34.45 kg/m2 34.7 kg/m2         Current Outpatient Prescriptions   Medication Sig Dispense Refill    levothyroxine (SYNTHROID) 137 mcg tablet Take 137 mcg by mouth Daily (before breakfast).  Biotin-Silicon Diox-L-Cysteine 5,000 mcg-100 mg- 50 mg tab Take 5,000 mcg by mouth two (2) times a day.  fluticasone-salmeterol (ADVAIR DISKUS) 250-50 mcg/dose diskus inhaler Take 1 Puff by inhalation every twelve (12) hours as needed.  albuterol (PROVENTIL VENTOLIN) 2.5 mg /3 mL (0.083 %) nebulizer solution by Nebulization route once. Indications: ACUTE ASTHMA ATTACK      estrogen, conjugated,-medroxyPROGESTERone (PREMPRO) 0.625-2.5 mg per tablet Take 1 Tab by mouth daily. Participation   Did you attend clinic and class last week?  yes    Review of Systems  Since your last visit, have you experienced any complications? no  If yes, please list:     No CP, SOB, palpitations, lightheadedness, dizziness, constipation. Positives highlight in BOLD. Are you taking an appetite suppressant? no  If so, is there any Chest Pain, Palpitations or Dizziness? BP Readings from Last 10 Encounters:   04/18/17 118/72   04/12/17 113/75   03/29/17 113/74   03/22/17 110/72   03/14/17 118/75   03/08/17 115/73   03/01/17 118/75   02/22/17 104/72   02/14/17 113/68   02/08/17 113/75         Have you received any other medical care this week? no  If yes, where and for what? Have you discontinued or changed any medicine or dose of your medicine since your last visit? no  If yes, where and for what? Diet  How many ounces of calorie-free liquids did you consume each day?  100 oz    How many meal replacements did you take each day? 4    Did you have any problems adhering to the program?  yes If yes, please explain: due to being on vacation      Exercise  Aerobic exercise: 180 min  Resistance exercise: 180 min workouts / week  Any discomfort?  no     If yes, where? Review of Systems  Complete ROS negative except where noted above    Objective  Visit Vitals    /72 (BP 1 Location: Left arm, BP Patient Position: Sitting)    Pulse 62    Temp 98.1 °F (36.7 °C) (Oral)    Resp 18    Ht 5' 5\" (1.651 m)    Wt 209 lb 8 oz (95 kg)    SpO2 98%    BMI 34.86 kg/m2     No LMP recorded. Patient is not currently having periods (Reason: Polycystic Ovarian Syndrome).     Waist Circumference: I personally reviewed patient's Weight Management Doc Flowsheet  Neck Circumference: I personally reviewed patient's Weight Management Doc Flowsheet  Percent Body Fat: I personally reviewed patient's Weight Management Doc Flowsheet    Physical Exam  Appearance: well appearing, obese, A&O, NAD  HEENT:  NC/AT, PERRL, No scleral icterus  Heart:  RRR without M/R/G  Lungs:  CTAB, no rhonchi, rales, or wheezes with good air exchange   Ext: trace LE Edema    Assessment / Plan    Encounter Diagnoses Name Primary?  Obesity (BMI 30-39. 9) Yes    Acquired hypothyroidism        1. Weight management borderline controlled, needs improvement, patient poorly compliant, needs to follow diet more regularly   Progress was reviewed with patient  Weight loss goal for 4 weeks:   8  Lbs.   2.  Labs    Latest results reviewed with patient   Lab slip given to pt for f/up HDL labs    3. Hypothyroidism: continues on synthroid 137mcg daily, TSH normal on labs this month. Patient Instructions     Health Maintenance Due   Topic Date Due    DTaP/Tdap/Td series (1 - Tdap) 08/22/2000    PAP AKA CERVICAL CYTOLOGY  08/22/2000    INFLUENZA AGE 9 TO ADULT  08/01/2016     Goals  - stick with it!!    Weight loss goal for 4 weeks:   8  Lbs. You can do it!!! Body Mass Index: Care Instructions  Your Care Instructions    Body mass index (BMI) can help you see if your weight is raising your risk for health problems. It uses a formula to compare how much you weigh with how tall you are. · A BMI lower than 18.5 is considered underweight. · A BMI between 18.5 and 24.9 is considered healthy. · A BMI between 25 and 29.9 is considered overweight. A BMI of 30 or higher is considered obese. If your BMI is in the normal range, it means that you have a lower risk for weight-related health problems. If your BMI is in the overweight or obese range, you may be at increased risk for weight-related health problems, such as high blood pressure, heart disease, stroke, arthritis or joint pain, and diabetes. If your BMI is in the underweight range, you may be at increased risk for health problems such as fatigue, lower protection (immunity) against illness, muscle loss, bone loss, hair loss, and hormone problems. BMI is just one measure of your risk for weight-related health problems.  You may be at higher risk for health problems if you are not active, you eat an unhealthy diet, or you drink too much alcohol or use tobacco products. Follow-up care is a key part of your treatment and safety. Be sure to make and go to all appointments, and call your doctor if you are having problems. It's also a good idea to know your test results and keep a list of the medicines you take. How can you care for yourself at home? · Practice healthy eating habits. This includes eating plenty of fruits, vegetables, whole grains, lean protein, and low-fat dairy. · If your doctor recommends it, get more exercise. Walking is a good choice. Bit by bit, increase the amount you walk every day. Try for at least 30 minutes on most days of the week. · Do not smoke. Smoking can increase your risk for health problems. If you need help quitting, talk to your doctor about stop-smoking programs and medicines. These can increase your chances of quitting for good. · Limit alcohol to 2 drinks a day for men and 1 drink a day for women. Too much alcohol can cause health problems. If you have a BMI higher than 25  · Your doctor may do other tests to check your risk for weight-related health problems. This may include measuring the distance around your waist. A waist measurement of more than 40 inches in men or 35 inches in women can increase the risk of weight-related health problems. · Talk with your doctor about steps you can take to stay healthy or improve your health. You may need to make lifestyle changes to lose weight and stay healthy, such as changing your diet and getting regular exercise. If you have a BMI lower than 18.5  · Your doctor may do other tests to check your risk for health problems. · Talk with your doctor about steps you can take to stay healthy or improve your health. You may need to make lifestyle changes to gain or maintain weight and stay healthy, such as getting more healthy foods in your diet and doing exercises to build muscle. Where can you learn more? Go to http://shirley-pawan.info/.   Enter S176 in the search box to learn more about \"Body Mass Index: Care Instructions. \"  Current as of: January 23, 2017  Content Version: 11.2  © 3322-1879 [employer]. Care instructions adapted under license by [employer] (which disclaims liability or warranty for this information). If you have questions about a medical condition or this instruction, always ask your healthcare professional. [address] any warranty or liability for your use of this information. Follow-up Disposition:  Return in about 4 weeks (around 5/16/2017). 10 minutes of the 15 minutes face to face time with Zulema Morris consisted of counseling & coordinating and/or discussing treatment plans in reference to her obesity. The primary encounter diagnosis was Obesity (BMI 30-39.9). A diagnosis of Acquired hypothyroidism was also pertinent to this visit. The patient is to follow up as scheduled and will report to the ED or the office if symptoms change or increase. The patient has voiced understanding and will comply.

## 2017-04-18 NOTE — PROGRESS NOTES
Chief Complaint   Patient presents with    Weight Management     1. Have you been to the ER, urgent care clinic since your last visit? Hospitalized since your last visit? No    2. Have you seen or consulted any other health care providers outside of the [address] [place] [address] since your last visit? Include any pap smears or colon screening.  No

## 2017-04-26 ENCOUNTER — CLINICAL SUPPORT (OUTPATIENT)
Dept: FAMILY MEDICINE CLINIC | Age: 37
End: 2017-04-26

## 2017-04-26 VITALS
BODY MASS INDEX: 34.35 KG/M2 | SYSTOLIC BLOOD PRESSURE: 108 MMHG | HEART RATE: 69 BPM | DIASTOLIC BLOOD PRESSURE: 70 MMHG | WEIGHT: 206.2 LBS | HEIGHT: 65 IN

## 2017-04-26 DIAGNOSIS — E66.9 OBESITY (BMI 30-39.9): Primary | ICD-10-CM

## 2017-04-26 NOTE — PROGRESS NOTES
Progress Note: Weekly Education Class in the [facility]   Is there anything that you or the patient needs to let the [facility] Physician know about? no  Over the past week, have you experienced any side-effects? Yes, constipation and is taking Miralax as needed every other day. Giovanny Barron is a 39 y.o. female who is enrolled in [facility]    Visit Vitals    /70 (BP 1 Location: Right arm, BP Patient Position: Sitting)    Pulse 69    Ht 5' 5\" (1.651 m)    Wt 206 lb 3.2 oz (93.5 kg)    BMI 34.31 kg/m2     Weight Metrics 4/26/2017 4/18/2017 4/18/2017 4/12/2017 4/12/2017 3/29/2017 3/22/2017   Weight 206 lb 3.2 oz - 209 lb 8 oz - 205 lb 12.8 oz 207 lb 207 lb   Waist Measure Inches 38 36 - 37 - 39 38   Exercise Mins/week - 180 - - - - -   Body Fat % - 39.1 - - - - -   BMI 34.31 kg/m2 - 34.86 kg/m2 - 34.25 kg/m2 34.45 kg/m2 34.45 kg/m2         Have you received any other medical care this week? no  If yes, where and for what? Have you had any change in your medications since your last visit? no  If yes what? Did you have any problems adhering to the program last week? no  If yes, please explain:       Eating Habits Over Last Week:  Did you take in 64 oz of non-caloric fluids? yes     Did you consume your 4 meal replacements each day?  yes       Physical Activity Over the Past Week:    Aerobic exercise: 150 min  Resistance exercise: 150 min workouts / week

## 2017-05-02 DIAGNOSIS — E66.9 OBESITY (BMI 30-39.9): ICD-10-CM

## 2017-05-02 DIAGNOSIS — E03.9 ACQUIRED HYPOTHYROIDISM: ICD-10-CM

## 2017-05-03 ENCOUNTER — CLINICAL SUPPORT (OUTPATIENT)
Dept: FAMILY MEDICINE CLINIC | Age: 37
End: 2017-05-03

## 2017-05-03 VITALS
HEART RATE: 64 BPM | SYSTOLIC BLOOD PRESSURE: 103 MMHG | HEIGHT: 65 IN | BODY MASS INDEX: 34.39 KG/M2 | DIASTOLIC BLOOD PRESSURE: 70 MMHG | WEIGHT: 206.4 LBS

## 2017-05-03 DIAGNOSIS — E66.9 OBESITY (BMI 30-39.9): Primary | ICD-10-CM

## 2017-05-03 NOTE — PROGRESS NOTES
Progress Note: Weekly Education Class in the [facility]   Is there anything that you or the patient needs to let the [facility] Physician know about? no  Over the past week, have you experienced any side-effects? no    Coby Barfield is a 39 y.o. female who is enrolled in [facility]    Visit Vitals    /70 (BP 1 Location: Left arm, BP Patient Position: Sitting)    Pulse 64    Ht 5' 5\" (1.651 m)    Wt 206 lb 6.4 oz (93.6 kg)    BMI 34.35 kg/m2     Weight Metrics 5/3/2017 4/26/2017 4/18/2017 4/18/2017 4/12/2017 4/12/2017 3/29/2017   Weight 206 lb 6.4 oz 206 lb 3.2 oz - 209 lb 8 oz - 205 lb 12.8 oz 207 lb   Waist Measure Inches 37 38 36 - 37 - 39   Exercise Mins/week - - 180 - - - -   Body Fat % - - 39.1 - - - -   BMI 34.35 kg/m2 34.31 kg/m2 - 34.86 kg/m2 - 34.25 kg/m2 34.45 kg/m2         Have you received any other medical care this week? no  If yes, where and for what? Have you had any change in your medications since your last visit? no  If yes what? Did you have any problems adhering to the program last week? no  If yes, please explain:       Eating Habits Over Last Week:  Did you take in 64 oz of non-caloric fluids? yes     Did you consume your 4 meal replacements each day?  yes       Physical Activity Over the Past Week:    Aerobic exercise: 150 min  Resistance exercise: 120 workouts / week

## 2017-05-10 ENCOUNTER — CLINICAL SUPPORT (OUTPATIENT)
Dept: FAMILY MEDICINE CLINIC | Age: 37
End: 2017-05-10

## 2017-05-10 VITALS
HEART RATE: 71 BPM | BODY MASS INDEX: 33.99 KG/M2 | SYSTOLIC BLOOD PRESSURE: 119 MMHG | DIASTOLIC BLOOD PRESSURE: 78 MMHG | HEIGHT: 65 IN | WEIGHT: 204 LBS

## 2017-05-10 DIAGNOSIS — E66.9 OBESITY (BMI 30-39.9): Primary | ICD-10-CM

## 2017-05-10 NOTE — PROGRESS NOTES
Progress Note: Weekly Education Class in the [facility]   Is there anything that you or the patient needs to let the [facility] Physician know about? no  Over the past week, have you experienced any side-effects? no    Srini Boland is a 39 y.o. female who is enrolled in [facility]    Visit Vitals    /78 (BP 1 Location: Left arm, BP Patient Position: Sitting)    Pulse 71    Ht 5' 5\" (1.651 m)    Wt 204 lb (92.5 kg)    BMI 33.95 kg/m2     Weight Metrics 5/10/2017 5/3/2017 4/26/2017 4/18/2017 4/18/2017 4/12/2017 4/12/2017   Weight 204 lb 206 lb 6.4 oz 206 lb 3.2 oz - 209 lb 8 oz - 205 lb 12.8 oz   Waist Measure Inches 37 37 38 36 - 37 -   Exercise Mins/week - - - 180 - - -   Body Fat % - - - 39.1 - - -   BMI 33.95 kg/m2 34.35 kg/m2 34.31 kg/m2 - 34.86 kg/m2 - 34.25 kg/m2         Have you received any other medical care this week? no  If yes, where and for what? Have you had any change in your medications since your last visit? no  If yes what? Did you have any problems adhering to the program last week? no  If yes, please explain:       Eating Habits Over Last Week:  Did you take in 64 oz of non-caloric fluids? yes     Did you consume your 4 meal replacements each day?  yes       Physical Activity Over the Past Week:    Aerobic exercise: 150 min  Resistance exercise: 120 min workouts / week

## 2017-05-18 ENCOUNTER — TELEPHONE (OUTPATIENT)
Dept: INTERNAL MEDICINE CLINIC | Age: 37
End: 2017-05-18

## 2017-05-18 NOTE — TELEPHONE ENCOUNTER
PT was not told the Johana Peñaloza was not here today so she is r/s for June 1- she wanted you to know she was not  At the meeting because she though she would have an appt today

## 2017-05-24 ENCOUNTER — CLINICAL SUPPORT (OUTPATIENT)
Dept: FAMILY MEDICINE CLINIC | Age: 37
End: 2017-05-24

## 2017-05-24 VITALS
HEART RATE: 73 BPM | DIASTOLIC BLOOD PRESSURE: 72 MMHG | SYSTOLIC BLOOD PRESSURE: 107 MMHG | WEIGHT: 205.4 LBS | BODY MASS INDEX: 34.22 KG/M2 | HEIGHT: 65 IN

## 2017-05-24 DIAGNOSIS — E66.9 OBESITY (BMI 30-39.9): Primary | ICD-10-CM

## 2017-05-29 NOTE — PROGRESS NOTES
Nurse note from patient's weekly VLCD / LCD / Maintenance class was reviewed. Pertinent medical concerns were:  None noted.      Continue current regimen

## 2017-06-01 ENCOUNTER — OFFICE VISIT (OUTPATIENT)
Dept: INTERNAL MEDICINE CLINIC | Age: 37
End: 2017-06-01

## 2017-06-01 VITALS
TEMPERATURE: 98 F | OXYGEN SATURATION: 98 % | DIASTOLIC BLOOD PRESSURE: 72 MMHG | WEIGHT: 203 LBS | HEIGHT: 65 IN | HEART RATE: 72 BPM | RESPIRATION RATE: 18 BRPM | SYSTOLIC BLOOD PRESSURE: 109 MMHG | BODY MASS INDEX: 33.82 KG/M2

## 2017-06-01 DIAGNOSIS — E66.9 OBESITY (BMI 30-39.9): Primary | ICD-10-CM

## 2017-06-01 RX ORDER — CHOLECALCIFEROL TAB 125 MCG (5000 UNIT) 125 MCG
5000 TAB ORAL DAILY
COMMUNITY

## 2017-06-01 NOTE — PROGRESS NOTES
[facility] Progress Note:   F/up Physician Visit    CC: Obesity      Benjamin Ganser is a 39 y.o. female who is here for her  f/up physician visit for the [facility] Program. Owen Menendez has completed 20 weeks of the program to date. Doing well, feels well continues to exercise, happy with slow weight loss and improved wellness.       Starting weight: 4/15/15 at 298 lbs  Current weight: 203 lbs  Goal weight: 170 lbs      Most recent EKG: 10/14/15 at 194lbs    Weight Metrics 6/1/2017 6/1/2017 5/24/2017 5/10/2017 5/3/2017 4/26/2017 4/18/2017   Weight - 203 lb 205 lb 6.4 oz 204 lb 206 lb 6.4 oz 206 lb 3.2 oz -   Waist Measure Inches 38 - 35 37 37 38 36   Exercise Mins/week 180 - - - - - 180   Body Fat % 38.8 - - - - - 39.1   BMI - 33.78 kg/m2 34.18 kg/m2 33.95 kg/m2 34.35 kg/m2 34.31 kg/m2 -         Current Outpatient Prescriptions   Medication Sig Dispense Refill    cholecalciferol, VITAMIN D3, (VITAMIN D3) 5,000 unit tab tablet Take 5,000 Units by mouth daily.  POLYETHYLENE GLYCOL 3350 (MIRALAX PO) Take  by mouth every other day.  levothyroxine (SYNTHROID) 137 mcg tablet Take 137 mcg by mouth Daily (before breakfast).  Biotin-Silicon Diox-L-Cysteine 5,000 mcg-100 mg- 50 mg tab Take 5,000 mcg by mouth two (2) times a day.  fluticasone-salmeterol (ADVAIR DISKUS) 250-50 mcg/dose diskus inhaler Take 1 Puff by inhalation every twelve (12) hours as needed.  albuterol (PROVENTIL VENTOLIN) 2.5 mg /3 mL (0.083 %) nebulizer solution by Nebulization route once. Indications: ACUTE ASTHMA ATTACK      estrogen, conjugated,-medroxyPROGESTERone (PREMPRO) 0.625-2.5 mg per tablet Take 1 Tab by mouth daily. Participation   Did you attend clinic and class last week? yes    Review of Systems  Since your last visit, have you experienced any complications? no  If yes, please list:     No CP, SOB, palpitations, lightheadedness, dizziness, constipation.   Positives highlight in BOLD.      Are you taking an appetite suppressant? no  If so, is there any Chest Pain, Palpitations or Dizziness? BP Readings from Last 10 Encounters:   06/01/17 109/72   05/24/17 107/72   05/10/17 119/78   05/03/17 103/70   04/26/17 108/70   04/18/17 118/72   04/12/17 113/75   03/29/17 113/74   03/22/17 110/72   03/14/17 118/75         Have you received any other medical care this week? no  If yes, where and for what? Have you discontinued or changed any medicine or dose of your medicine since your last visit? no  If yes, where and for what? Diet  How many ounces of calorie-free liquids did you consume each day?  100 oz    How many meal replacements did you take each day? 4    Did you have any problems adhering to the program?  no If yes, please explain:       Exercise  Aerobic exercise: 180 min  Resistance exercise: 180 workouts / week  Any discomfort?  no     If yes, where? Review of Systems  Complete ROS negative except where noted above    Objective  Visit Vitals    /72 (BP 1 Location: Right arm, BP Patient Position: Sitting)    Pulse 72    Temp 98 °F (36.7 °C) (Oral)    Resp 18    Ht 5' 5\" (1.651 m)    Wt 203 lb (92.1 kg)    SpO2 98%    BMI 33.78 kg/m2     No LMP recorded. Patient is not currently having periods (Reason: Polycystic Ovarian Syndrome). Waist Circumference: I personally reviewed patient's Weight Management Doc Flowsheet  Neck Circumference: I personally reviewed patient's Weight Management Doc Flowsheet  Percent Body Fat: I personally reviewed patient's Weight Management Doc Flowsheet    Physical Exam  Appearance: well appearing, A&O, NAD  HEENT:  NC/AT, PERRL, No scleral icterus  Heart:  RRR without M/R/G  Lungs:  CTAB, no rhonchi, rales, or wheezes with good air exchange   Ext:  No LE Edema    Assessment / Plan    Encounter Diagnosis   Name Primary?  Obesity (BMI 30-39. 9) Yes       1.   Weight management stable, reasonably well controlled   Progress was reviewed with patient    2. Labs    Latest results reviewed with patient   Lab slip given to pt for f/up HDL labs    3. Diet regimen   # of meal replacements prescribed: 4   If modified LCD-nutritional guidelines:    Monthly Goal   5-8 lbs    Medical monitoring schedule:   Weekly BP/Weight checks   Monthly provider appointments          Patient Instructions   Monthly Goal:    5-8 lbs weight loss         Body Mass Index: Care Instructions  Your Care Instructions    Body mass index (BMI) can help you see if your weight is raising your risk for health problems. It uses a formula to compare how much you weigh with how tall you are. · A BMI lower than 18.5 is considered underweight. · A BMI between 18.5 and 24.9 is considered healthy. · A BMI between 25 and 29.9 is considered overweight. A BMI of 30 or higher is considered obese. If your BMI is in the normal range, it means that you have a lower risk for weight-related health problems. If your BMI is in the overweight or obese range, you may be at increased risk for weight-related health problems, such as high blood pressure, heart disease, stroke, arthritis or joint pain, and diabetes. If your BMI is in the underweight range, you may be at increased risk for health problems such as fatigue, lower protection (immunity) against illness, muscle loss, bone loss, hair loss, and hormone problems. BMI is just one measure of your risk for weight-related health problems. You may be at higher risk for health problems if you are not active, you eat an unhealthy diet, or you drink too much alcohol or use tobacco products. Follow-up care is a key part of your treatment and safety. Be sure to make and go to all appointments, and call your doctor if you are having problems. It's also a good idea to know your test results and keep a list of the medicines you take. How can you care for yourself at home? · Practice healthy eating habits.  This includes eating plenty of fruits, vegetables, whole grains, lean protein, and low-fat dairy. · If your doctor recommends it, get more exercise. Walking is a good choice. Bit by bit, increase the amount you walk every day. Try for at least 30 minutes on most days of the week. · Do not smoke. Smoking can increase your risk for health problems. If you need help quitting, talk to your doctor about stop-smoking programs and medicines. These can increase your chances of quitting for good. · Limit alcohol to 2 drinks a day for men and 1 drink a day for women. Too much alcohol can cause health problems. If you have a BMI higher than 25  · Your doctor may do other tests to check your risk for weight-related health problems. This may include measuring the distance around your waist. A waist measurement of more than 40 inches in men or 35 inches in women can increase the risk of weight-related health problems. · Talk with your doctor about steps you can take to stay healthy or improve your health. You may need to make lifestyle changes to lose weight and stay healthy, such as changing your diet and getting regular exercise. If you have a BMI lower than 18.5  · Your doctor may do other tests to check your risk for health problems. · Talk with your doctor about steps you can take to stay healthy or improve your health. You may need to make lifestyle changes to gain or maintain weight and stay healthy, such as getting more healthy foods in your diet and doing exercises to build muscle. Where can you learn more? Go to http://shirley-pawan.info/. Enter S176 in the search box to learn more about \"Body Mass Index: Care Instructions. \"  Current as of: January 23, 2017  Content Version: 11.2  © 9725-2050 [employer] (fuelpowered.com). Care instructions adapted under license by [employer] (which disclaims liability or warranty for this information).  If you have questions about a medical condition or this instruction, always ask your healthcare professional. [place] [address] any warranty or liability for your use of this information. Follow-up Disposition:  Return in about 4 weeks (around 6/29/2017). 10 minutes of the 15 minutes face to face time with Viky Ramachandran consisted of counseling & coordinating and/or discussing treatment plans in reference to her The encounter diagnosis was Obesity (BMI 30-39.9). The patient is to follow up as scheduled and will report to the ED or the office if symptoms change or increase. The patient has voiced understanding and will comply.

## 2017-06-01 NOTE — PROGRESS NOTES
Chief Complaint   Patient presents with    Weight Management     1. Have you been to the ER, urgent care clinic since your last visit? Hospitalized since your last visit? No    2. Have you seen or consulted any other health care providers outside of the [address] [place] [address] since your last visit? Include any pap smears or colon screening.  No

## 2017-06-01 NOTE — PATIENT INSTRUCTIONS
Monthly Goal:    5-8 lbs weight loss         Body Mass Index: Care Instructions  Your Care Instructions    Body mass index (BMI) can help you see if your weight is raising your risk for health problems. It uses a formula to compare how much you weigh with how tall you are. · A BMI lower than 18.5 is considered underweight. · A BMI between 18.5 and 24.9 is considered healthy. · A BMI between 25 and 29.9 is considered overweight. A BMI of 30 or higher is considered obese. If your BMI is in the normal range, it means that you have a lower risk for weight-related health problems. If your BMI is in the overweight or obese range, you may be at increased risk for weight-related health problems, such as high blood pressure, heart disease, stroke, arthritis or joint pain, and diabetes. If your BMI is in the underweight range, you may be at increased risk for health problems such as fatigue, lower protection (immunity) against illness, muscle loss, bone loss, hair loss, and hormone problems. BMI is just one measure of your risk for weight-related health problems. You may be at higher risk for health problems if you are not active, you eat an unhealthy diet, or you drink too much alcohol or use tobacco products. Follow-up care is a key part of your treatment and safety. Be sure to make and go to all appointments, and call your doctor if you are having problems. It's also a good idea to know your test results and keep a list of the medicines you take. How can you care for yourself at home? · Practice healthy eating habits. This includes eating plenty of fruits, vegetables, whole grains, lean protein, and low-fat dairy. · If your doctor recommends it, get more exercise. Walking is a good choice. Bit by bit, increase the amount you walk every day. Try for at least 30 minutes on most days of the week. · Do not smoke. Smoking can increase your risk for health problems.  If you need help quitting, talk to your doctor about stop-smoking programs and medicines. These can increase your chances of quitting for good. · Limit alcohol to 2 drinks a day for men and 1 drink a day for women. Too much alcohol can cause health problems. If you have a BMI higher than 25  · Your doctor may do other tests to check your risk for weight-related health problems. This may include measuring the distance around your waist. A waist measurement of more than 40 inches in men or 35 inches in women can increase the risk of weight-related health problems. · Talk with your doctor about steps you can take to stay healthy or improve your health. You may need to make lifestyle changes to lose weight and stay healthy, such as changing your diet and getting regular exercise. If you have a BMI lower than 18.5  · Your doctor may do other tests to check your risk for health problems. · Talk with your doctor about steps you can take to stay healthy or improve your health. You may need to make lifestyle changes to gain or maintain weight and stay healthy, such as getting more healthy foods in your diet and doing exercises to build muscle. Where can you learn more? Go to http://shirley-pawan.info/. Enter S176 in the search box to learn more about \"Body Mass Index: Care Instructions. \"  Current as of: January 23, 2017  Content Version: 11.2  © 6752-9171 [employer]. Care instructions adapted under license by [employer] (which disclaims liability or warranty for this information). If you have questions about a medical condition or this instruction, always ask your healthcare professional. [place] [address] any warranty or liability for your use of this information.

## 2017-06-01 NOTE — MR AVS SNAPSHOT
Visit Information Date & Time Provider Department Dept. Phone Encounter #  
 6/1/2017  3:30 PM Vicki Mckeon NP Internist of [facility] [id number] Follow-up Instructions Return in about 4 weeks (around 6/29/2017). Your Appointments 7/12/2017  3:50 AM  
METABOLIC PROGRAM 30 with Vicki Mckeon NP Internist of [facility] ([facility]) Appt Note: 1 month follow up  
 [address], [facility] [address] [place] [address] [place]  
  
   
 [address] [facility] Upcoming Health Maintenance Date Due DTaP/Tdap/Td series (1 - Tdap) 8/22/2000 PAP AKA CERVICAL CYTOLOGY 8/22/2000 INFLUENZA AGE 9 TO ADULT 8/1/2017 Allergies as of 6/1/2017  Review Complete On: 6/1/2017 By: Vicki Mckeon NP Severity Noted Reaction Type Reactions Sulfa (Sulfonamide Antibiotics)  04/15/2015    Rash Current Immunizations  Never Reviewed No immunizations on file. Not reviewed this visit You Were Diagnosed With   
  
 Codes Comments Obesity (BMI 30-39.9)    -  Primary ICD-10-CM: B55.3 ICD-9-CM: 278.00 Vitals BP Pulse Temp Resp Height(growth percentile) Weight(growth percentile) 109/72 (BP 1 Location: Right arm, BP Patient Position: Sitting) 72 98 °F (36.7 °C) (Oral) 18 5' 5\" (1.651 m) 203 lb (92.1 kg) SpO2 BMI OB Status Smoking Status 98% 33.78 kg/m2 Polycystic Ovarian Syndrome Never Smoker BMI and BSA Data Body Mass Index Body Surface Area 33.78 kg/m 2 2.06 m 2 Preferred Pharmacy Pharmacy Name Phone [facility], [address] [place] [address] Rd [phone] Your Updated Medication List  
  
   
This list is accurate as of: 6/1/17  3:50 PM.  Always use your most recent med list.  
  
  
  
  
 Druscilla Clarita 250-50 mcg/dose diskus inhaler Generic drug:  fluticasone-salmeterol Take 1 Puff by inhalation every twelve (12) hours as needed. albuterol 2.5 mg /3 mL (0.083 %) nebulizer solution Commonly known as:  PROVENTIL VENTOLIN  
by Nebulization route once. Indications: ACUTE ASTHMA ATTACK Biotin-Silicon Diox-L-Cysteine 5,000 mcg -100 mg-50 mg Tab Take 5,000 mcg by mouth two (2) times a day. cholecalciferol (VITAMIN D3) 5,000 unit Tab tablet Commonly known as:  VITAMIN D3 Take 5,000 Units by mouth daily. levothyroxine 137 mcg tablet Commonly known as:  SYNTHROID Take 137 mcg by mouth Daily (before breakfast). MIRALAX PO Take  by mouth every other day. PREMPRO 0.625-2.5 mg per tablet Generic drug:  estrogen (conjugated)-medroxyPROGESTERone Take 1 Tab by mouth daily. Follow-up Instructions Return in about 4 weeks (around 6/29/2017). To-Do List   
 06/01/2017 Lab:  METABOLIC PANEL, COMPREHENSIVE   
  
 06/01/2017 Lab:  URIC ACID Patient Instructions Monthly Goal: 
 
5-8 lbs weight loss Body Mass Index: Care Instructions Your Care Instructions Body mass index (BMI) can help you see if your weight is raising your risk for health problems. It uses a formula to compare how much you weigh with how tall you are. · A BMI lower than 18.5 is considered underweight. · A BMI between 18.5 and 24.9 is considered healthy. · A BMI between 25 and 29.9 is considered overweight. A BMI of 30 or higher is considered obese. If your BMI is in the normal range, it means that you have a lower risk for weight-related health problems. If your BMI is in the overweight or obese range, you may be at increased risk for weight-related health problems, such as high blood pressure, heart disease, stroke, arthritis or joint pain, and diabetes.  If your BMI is in the underweight range, you may be at increased risk for health problems such as fatigue, lower protection (immunity) against illness, muscle loss, bone loss, hair loss, and hormone problems. BMI is just one measure of your risk for weight-related health problems. You may be at higher risk for health problems if you are not active, you eat an unhealthy diet, or you drink too much alcohol or use tobacco products. Follow-up care is a key part of your treatment and safety. Be sure to make and go to all appointments, and call your doctor if you are having problems. It's also a good idea to know your test results and keep a list of the medicines you take. How can you care for yourself at home? · Practice healthy eating habits. This includes eating plenty of fruits, vegetables, whole grains, lean protein, and low-fat dairy. · If your doctor recommends it, get more exercise. Walking is a good choice. Bit by bit, increase the amount you walk every day. Try for at least 30 minutes on most days of the week. · Do not smoke. Smoking can increase your risk for health problems. If you need help quitting, talk to your doctor about stop-smoking programs and medicines. These can increase your chances of quitting for good. · Limit alcohol to 2 drinks a day for men and 1 drink a day for women. Too much alcohol can cause health problems. If you have a BMI higher than 25 · Your doctor may do other tests to check your risk for weight-related health problems. This may include measuring the distance around your waist. A waist measurement of more than 40 inches in men or 35 inches in women can increase the risk of weight-related health problems. · Talk with your doctor about steps you can take to stay healthy or improve your health. You may need to make lifestyle changes to lose weight and stay healthy, such as changing your diet and getting regular exercise. If you have a BMI lower than 18.5 · Your doctor may do other tests to check your risk for health problems.  
· Talk with your doctor about steps you can take to stay healthy or improve your health. You may need to make lifestyle changes to gain or maintain weight and stay healthy, such as getting more healthy foods in your diet and doing exercises to build muscle. Where can you learn more? Go to http://shirley-pawan.info/. Enter S176 in the search box to learn more about \"Body Mass Index: Care Instructions. \" Current as of: January 23, 2017 Content Version: 11.2 © 2650-5157 [employer]. Care instructions adapted under license by [employer] (which disclaims liability or warranty for this information). If you have questions about a medical condition or this instruction, always ask your healthcare professional. [address] any warranty or liability for your use of this information. Introducing [facility]! Dear Temitope Willett: Thank you for requesting a Responsa account. Our records indicate that you already have an active Responsa account. You can access your account anytime at [email or web address] Did you know that you can access your hospital and ER discharge instructions at any time in Responsa? You can also review all of your test results from your hospital stay or ER visit. Additional Information If you have questions, please visit the Frequently Asked Questions section of the Responsa website at [email or web address] Remember, Responsa is NOT to be used for urgent needs. For medical emergencies, dial 911. Now available from your iPhone and Android! Please provide this summary of care documentation to your next provider. Your primary care clinician is listed as [de-identified] M MIRANDA. If you have any questions after today's visit, please call [phone].

## 2017-06-07 ENCOUNTER — CLINICAL SUPPORT (OUTPATIENT)
Dept: FAMILY MEDICINE CLINIC | Age: 37
End: 2017-06-07

## 2017-06-07 VITALS
HEIGHT: 65 IN | BODY MASS INDEX: 34.32 KG/M2 | SYSTOLIC BLOOD PRESSURE: 102 MMHG | DIASTOLIC BLOOD PRESSURE: 68 MMHG | WEIGHT: 206 LBS | HEART RATE: 67 BPM

## 2017-06-07 DIAGNOSIS — E66.9 OBESITY (BMI 30-39.9): Primary | ICD-10-CM

## 2017-06-07 NOTE — PROGRESS NOTES
Progress Note: Weekly Education Class in the [facility]   Is there anything that you or the patient needs to let the [facility] Physician know about? no  Over the past week, have you experienced any side-effects? no    Avelina Osborn is a 39 y.o. female who is enrolled in [facility]    Visit Vitals    /68 (BP 1 Location: Left arm, BP Patient Position: Sitting)    Pulse 67    Ht 5' 5\" (1.651 m)    Wt 206 lb (93.4 kg)    BMI 34.28 kg/m2     Weight Metrics 6/7/2017 6/1/2017 6/1/2017 5/24/2017 5/10/2017 5/3/2017 4/26/2017   Weight 206 lb - 203 lb 205 lb 6.4 oz 204 lb 206 lb 6.4 oz 206 lb 3.2 oz   Waist Measure Inches 35 38 - 35 37 37 38   Exercise Mins/week - 180 - - - - -   Body Fat % - 38.8 - - - - -   BMI 34.28 kg/m2 - 33.78 kg/m2 34.18 kg/m2 33.95 kg/m2 34.35 kg/m2 34.31 kg/m2         Have you received any other medical care this week? no  If yes, where and for what? Have you had any change in your medications since your last visit? no  If yes what? Did you have any problems adhering to the program last week? no  If yes, please explain:       Eating Habits Over Last Week:  Did you take in 64 oz of non-caloric fluids? yes     Did you consume your 4 meal replacements each day?  yes       Physical Activity Over the Past Week:    Aerobic exercise: 150 min  Resistance exercise: 150 min workouts / week

## 2017-06-14 ENCOUNTER — CLINICAL SUPPORT (OUTPATIENT)
Dept: FAMILY MEDICINE CLINIC | Age: 37
End: 2017-06-14

## 2017-06-14 VITALS
WEIGHT: 206.4 LBS | HEART RATE: 65 BPM | BODY MASS INDEX: 34.39 KG/M2 | SYSTOLIC BLOOD PRESSURE: 107 MMHG | HEIGHT: 65 IN | DIASTOLIC BLOOD PRESSURE: 69 MMHG

## 2017-06-14 DIAGNOSIS — E66.9 OBESITY (BMI 30-39.9): Primary | ICD-10-CM

## 2017-06-14 NOTE — PROGRESS NOTES
Progress Note: Weekly Education Class in the [facility]   Is there anything that you or the patient needs to let the [address] [facility] Physician know about? no  Over the past week, have you experienced any side-effects? no    Jennifer Jones is a 39 y.o. female who is enrolled in [facility]    Visit Vitals    /69 (BP 1 Location: Right arm, BP Patient Position: Sitting)    Pulse 65    Ht 5' 5\" (1.651 m)    Wt 206 lb 6.4 oz (93.6 kg)    BMI 34.35 kg/m2     Weight Metrics 6/14/2017 6/7/2017 6/1/2017 6/1/2017 5/24/2017 5/10/2017 5/3/2017   Weight 206 lb 6.4 oz 206 lb - 203 lb 205 lb 6.4 oz 204 lb 206 lb 6.4 oz   Waist Measure Inches 34 35 38 - 35 37 37   Exercise Mins/week - - 180 - - - -   Body Fat % - - 38.8 - - - -   BMI 34.35 kg/m2 34.28 kg/m2 - 33.78 kg/m2 34.18 kg/m2 33.95 kg/m2 34.35 kg/m2         Have you received any other medical care this week? no  If yes, where and for what? Have you had any change in your medications since your last visit? no  If yes what? Did you have any problems adhering to the program last week? no  If yes, please explain:       Eating Habits Over Last Week:  Did you take in 64 oz of non-caloric fluids? yes     Did you consume your 4 meal replacements each day?  yes       Physical Activity Over the Past Week:    Aerobic exercise: 180 min  Resistance exercise: 90 min workouts / week

## 2017-06-28 ENCOUNTER — CLINICAL SUPPORT (OUTPATIENT)
Dept: FAMILY MEDICINE CLINIC | Age: 37
End: 2017-06-28

## 2017-06-28 DIAGNOSIS — E66.9 OBESITY, UNSPECIFIED CLASSIFICATION, UNSPECIFIED OBESITY TYPE, UNSPECIFIED WHETHER SERIOUS COMORBIDITY PRESENT: Primary | ICD-10-CM

## 2017-06-29 VITALS
DIASTOLIC BLOOD PRESSURE: 77 MMHG | WEIGHT: 212.2 LBS | BODY MASS INDEX: 35.31 KG/M2 | HEART RATE: 74 BPM | SYSTOLIC BLOOD PRESSURE: 115 MMHG

## 2017-06-29 NOTE — PROGRESS NOTES
Progress Note: Weekly Education Class in the [facility]   Is there anything that you or the patient needs to let the [address] [facility] Physician know about? no  Over the past week, have you experienced any side-effects? no    Henrry Shaikh is a 39 y.o. female who is enrolled in [facility]    Visit Vitals    /77    Pulse 74    Wt 212 lb 3.2 oz (96.3 kg)    BMI 35.31 kg/m2     Weight Metrics 6/28/2017 6/14/2017 6/7/2017 6/1/2017 6/1/2017 5/24/2017 5/10/2017   Weight 212 lb 3.2 oz 206 lb 6.4 oz 206 lb - 203 lb 205 lb 6.4 oz 204 lb   Waist Measure Inches - 34 35 38 - 35 37   Exercise Mins/week - - - 180 - - -   Body Fat % - - - 38.8 - - -   BMI 35.31 kg/m2 34.35 kg/m2 34.28 kg/m2 - 33.78 kg/m2 34.18 kg/m2 33.95 kg/m2         Have you received any other medical care this week? no  If yes, where and for what? Have you had any change in your medications since your last visit? no  If yes what? Did you have any problems adhering to the program last week? no  If yes, please explain:       Eating Habits Over Last Week:  Did you take in 64 oz of non-caloric fluids? yes     Did you consume your 4 meal replacements each day?  yes       Physical Activity Over the Past Week:    Aerobic exercise: 150 min  Resistance exercise: 5 workouts / week

## 2017-07-12 ENCOUNTER — HOSPITAL ENCOUNTER (OUTPATIENT)
Dept: LAB | Age: 37
Discharge: HOME | End: 2017-07-12
Payer: OTHER GOVERNMENT

## 2017-07-12 ENCOUNTER — OFFICE VISIT (OUTPATIENT)
Dept: INTERNAL MEDICINE CLINIC | Age: 37
End: 2017-07-12

## 2017-07-12 VITALS
OXYGEN SATURATION: 99 % | SYSTOLIC BLOOD PRESSURE: 106 MMHG | WEIGHT: 207.6 LBS | HEART RATE: 61 BPM | HEIGHT: 65 IN | TEMPERATURE: 97.2 F | BODY MASS INDEX: 34.59 KG/M2 | DIASTOLIC BLOOD PRESSURE: 67 MMHG | RESPIRATION RATE: 12 BRPM

## 2017-07-12 DIAGNOSIS — E66.9 OBESITY (BMI 30-39.9): Primary | ICD-10-CM

## 2017-07-12 DIAGNOSIS — E66.9 OBESITY (BMI 30-39.9): ICD-10-CM

## 2017-07-12 LAB
ALBUMIN SERPL BCP-MCNC: 4.1 G/DL (ref 3.4–5)
ALBUMIN/GLOB SERPL: 1.2 {RATIO} (ref 0.8–1.7)
ALP SERPL-CCNC: 71 U/L (ref 45–117)
ALT SERPL-CCNC: 28 U/L (ref 13–56)
ANION GAP BLD CALC-SCNC: 9 MMOL/L (ref 3–18)
AST SERPL W P-5'-P-CCNC: 18 U/L (ref 15–37)
BILIRUB SERPL-MCNC: 0.6 MG/DL (ref 0.2–1)
BUN SERPL-MCNC: 19 MG/DL (ref 7–18)
BUN/CREAT SERPL: 24 (ref 12–20)
CALCIUM SERPL-MCNC: 9.1 MG/DL (ref 8.5–10.1)
CHLORIDE SERPL-SCNC: 106 MMOL/L (ref 100–108)
CO2 SERPL-SCNC: 26 MMOL/L (ref 21–32)
CREAT SERPL-MCNC: 0.78 MG/DL (ref 0.6–1.3)
GLOBULIN SER CALC-MCNC: 3.5 G/DL (ref 2–4)
GLUCOSE SERPL-MCNC: 82 MG/DL (ref 74–99)
POTASSIUM SERPL-SCNC: 4.3 MMOL/L (ref 3.5–5.5)
PROT SERPL-MCNC: 7.6 G/DL (ref 6.4–8.2)
SODIUM SERPL-SCNC: 141 MMOL/L (ref 136–145)
URATE SERPL-MCNC: 3.3 MG/DL (ref 2.6–7.2)

## 2017-07-12 PROCEDURE — 80053 COMPREHEN METABOLIC PANEL: CPT | Performed by: NURSE PRACTITIONER

## 2017-07-12 PROCEDURE — 36415 COLL VENOUS BLD VENIPUNCTURE: CPT | Performed by: NURSE PRACTITIONER

## 2017-07-12 PROCEDURE — 84550 ASSAY OF BLOOD/URIC ACID: CPT | Performed by: NURSE PRACTITIONER

## 2017-07-12 NOTE — MR AVS SNAPSHOT
Visit Information Date & Time Provider Department Dept. Phone Encounter #  
 7/12/2017  9:30 AM Amanda Mcdonald NP Internist of [facility] [id number] Follow-up Instructions Return in about 4 weeks (around 8/9/2017). Your Appointments 8/10/2017  1:67 AM  
METABOLIC PROGRAM 30 with Amanda Mcdonald NP Internist of [facility] ([address] [facility]) Appt Note: mwl 2 mo f/u  
 [address] [facility], [address] [address] [address]  
  
   
 [address] [facility] Upcoming Health Maintenance Date Due DTaP/Tdap/Td series (1 - Tdap) 8/22/2000 PAP AKA CERVICAL CYTOLOGY 8/22/2000 INFLUENZA AGE 9 TO ADULT 8/1/2017 Allergies as of 7/12/2017  Review Complete On: 7/12/2017 By: Amanda Mcdonald NP Severity Noted Reaction Type Reactions Sulfa (Sulfonamide Antibiotics)  04/15/2015    Rash Current Immunizations  Never Reviewed No immunizations on file. Not reviewed this visit You Were Diagnosed With   
  
 Codes Comments Obesity (BMI 30-39.9)    -  Primary ICD-10-CM: V62.8 ICD-9-CM: 278.00 Vitals BP Pulse Temp Resp Height(growth percentile) Weight(growth percentile) 106/67 (BP 1 Location: Left arm, BP Patient Position: Sitting) 61 97.2 °F (36.2 °C) (Oral) 12 5' 5\" (1.651 m) 207 lb 9.6 oz (94.2 kg) SpO2 BMI OB Status Smoking Status 99% 34.55 kg/m2 Polycystic Ovarian Syndrome Never Smoker BMI and BSA Data Body Mass Index Body Surface Area 34.55 kg/m 2 2.08 m 2 Preferred Pharmacy Pharmacy Name Phone [facility] [address] [phone] Your Updated Medication List  
  
   
This list is accurate as of: 7/12/17 10:12 AM.  Always use your most recent med list.  
  
  
  
  
 ADVAIR DISKUS 250-50 mcg/dose diskus inhaler Generic drug:  fluticasone-salmeterol Take 1 Puff by inhalation every twelve (12) hours as needed. albuterol 2.5 mg /3 mL (0.083 %) nebulizer solution Commonly known as:  PROVENTIL VENTOLIN  
by Nebulization route once. Indications: ACUTE ASTHMA ATTACK Biotin-Silicon Diox-L-Cysteine 5,000 mcg -100 mg-50 mg Tab Take 5,000 mcg by mouth two (2) times a day. cholecalciferol (VITAMIN D3) 5,000 unit Tab tablet Commonly known as:  VITAMIN D3 Take 5,000 Units by mouth daily. levothyroxine 137 mcg tablet Commonly known as:  SYNTHROID Take 137 mcg by mouth Daily (before breakfast). MIRALAX PO Take  by mouth every other day. PREMPRO 0.625-2.5 mg per tablet Generic drug:  estrogen (conjugated)-medroxyPROGESTERone Take 1 Tab by mouth daily. Follow-up Instructions Return in about 4 weeks (around 8/9/2017). Patient Instructions Health Maintenance Due Topic Date Due  
 DTaP/Tdap/Td series (1 - Tdap) 08/22/2000  PAP AKA CERVICAL CYTOLOGY  08/22/2000 Monthly Goal: 
 
12 lbs weight loss Continue with activities Smart choices Body Mass Index: Care Instructions Your Care Instructions Body mass index (BMI) can help you see if your weight is raising your risk for health problems. It uses a formula to compare how much you weigh with how tall you are. · A BMI lower than 18.5 is considered underweight. · A BMI between 18.5 and 24.9 is considered healthy. · A BMI between 25 and 29.9 is considered overweight. A BMI of 30 or higher is considered obese. If your BMI is in the normal range, it means that you have a lower risk for weight-related health problems. If your BMI is in the overweight or obese range, you may be at increased risk for weight-related health problems, such as high blood pressure, heart disease, stroke, arthritis or joint pain, and diabetes.  If your BMI is in the underweight range, you may be at increased risk for health problems such as fatigue, lower protection (immunity) against illness, muscle loss, bone loss, hair loss, and hormone problems. BMI is just one measure of your risk for weight-related health problems. You may be at higher risk for health problems if you are not active, you eat an unhealthy diet, or you drink too much alcohol or use tobacco products. Follow-up care is a key part of your treatment and safety. Be sure to make and go to all appointments, and call your doctor if you are having problems. It's also a good idea to know your test results and keep a list of the medicines you take. How can you care for yourself at home? · Practice healthy eating habits. This includes eating plenty of fruits, vegetables, whole grains, lean protein, and low-fat dairy. · If your doctor recommends it, get more exercise. Walking is a good choice. Bit by bit, increase the amount you walk every day. Try for at least 30 minutes on most days of the week. · Do not smoke. Smoking can increase your risk for health problems. If you need help quitting, talk to your doctor about stop-smoking programs and medicines. These can increase your chances of quitting for good. · Limit alcohol to 2 drinks a day for men and 1 drink a day for women. Too much alcohol can cause health problems. If you have a BMI higher than 25 · Your doctor may do other tests to check your risk for weight-related health problems. This may include measuring the distance around your waist. A waist measurement of more than 40 inches in men or 35 inches in women can increase the risk of weight-related health problems. · Talk with your doctor about steps you can take to stay healthy or improve your health. You may need to make lifestyle changes to lose weight and stay healthy, such as changing your diet and getting regular exercise. If you have a BMI lower than 18.5 · Your doctor may do other tests to check your risk for health problems. · Talk with your doctor about steps you can take to stay healthy or improve your health. You may need to make lifestyle changes to gain or maintain weight and stay healthy, such as getting more healthy foods in your diet and doing exercises to build muscle. Where can you learn more? Go to http://shirley-pawan.info/. Enter S176 in the search box to learn more about \"Body Mass Index: Care Instructions. \" Current as of: January 23, 2017 Content Version: 11.3 © 0762-1140 [employer]elle. Care instructions adapted under license by Noteleaf (which disclaims liability or warranty for this information). If you have questions about a medical condition or this instruction, always ask your healthcare professional. [address] any warranty or liability for your use of this information. Introducing \[facility] [place] [facility]\"" & HEALTH SERVICES! Dear Jeremi Winters: Thank you for requesting a [employer] account. Our records indicate that you already have an active [employer] account. You can access your account anytime at [email or web address] Did you know that you can access your hospital and ER discharge instructions at any time in [employer]? You can also review all of your test results from your hospital stay or ER visit. Additional Information If you have questions, please visit the Frequently Asked Questions section of the [employer] website at [email or web address] Remember, [employer] is NOT to be used for urgent needs. For medical emergencies, dial 911. Now available from your iPhone and Android! Please provide this summary of care documentation to your next provider. Your primary care clinician is listed as [de-identified] M MIRANDA. If you have any questions after today's visit, please call [phone].

## 2017-07-12 NOTE — PROGRESS NOTES
Chief Complaint   Patient presents with    Weight Management   1. Have you been to the ER, urgent care clinic since your last visit? Hospitalized since your last visit? No    2. Have you seen or consulted any other health care providers outside of the [facility] since your last visit? Include any pap smears or colon screening.  No

## 2017-07-12 NOTE — PROGRESS NOTES
[facility] Progress Note:   F/up Physician Visit    CC: Obesity      Tari Guevaraite is a 39 y.o. female who is here for her  f/up physician visit for the VLCD Program. Luan Lawson has completed 23 weeks of the program to date. She had a small \"blip\" with weight gain -  deployment, end of school. Back on track, feeling well, very committed.       Starting weight: 4/15/15 at 298 lbs  Current weight: 203 lbs  Goal weight: 170 lbs      Most recent EKG: 10/14/15 at 194lbs    Weight Metrics 7/12/2017 7/12/2017 6/28/2017 6/14/2017 6/7/2017 6/1/2017 6/1/2017   Weight - 207 lb 9.6 oz 212 lb 3.2 oz 206 lb 6.4 oz 206 lb - 203 lb   Waist Measure Inches 35.5 - - 34 35 38 -   Exercise Mins/week 180 - - - - 180 -   Body Fat % 38.8 - - - - 38.8 -   BMI - 34.55 kg/m2 35.31 kg/m2 34.35 kg/m2 34.28 kg/m2 - 33.78 kg/m2         Current Outpatient Prescriptions   Medication Sig Dispense Refill    cholecalciferol, VITAMIN D3, (VITAMIN D3) 5,000 unit tab tablet Take 5,000 Units by mouth daily.  POLYETHYLENE GLYCOL 3350 (MIRALAX PO) Take  by mouth every other day.  levothyroxine (SYNTHROID) 137 mcg tablet Take 137 mcg by mouth Daily (before breakfast).  Biotin-Silicon Diox-L-Cysteine 5,000 mcg-100 mg- 50 mg tab Take 5,000 mcg by mouth two (2) times a day.  fluticasone-salmeterol (ADVAIR DISKUS) 250-50 mcg/dose diskus inhaler Take 1 Puff by inhalation every twelve (12) hours as needed.  albuterol (PROVENTIL VENTOLIN) 2.5 mg /3 mL (0.083 %) nebulizer solution by Nebulization route once. Indications: ACUTE ASTHMA ATTACK      estrogen, conjugated,-medroxyPROGESTERone (PREMPRO) 0.625-2.5 mg per tablet Take 1 Tab by mouth daily. Participation   Did you attend clinic and class last week?  yes    Review of Systems  Since your last visit, have you experienced any complications? no  If yes, please list:     No CP, SOB, palpitations, lightheadedness, dizziness, constipation. Positives highlight in BOLD. Are you taking an appetite suppressant? no  If so, is there any Chest Pain, Palpitations or Dizziness? BP Readings from Last 10 Encounters:   07/12/17 106/67   06/29/17 115/77   06/14/17 107/69   06/07/17 102/68   06/01/17 109/72   05/24/17 107/72   05/10/17 119/78   05/03/17 103/70   04/26/17 108/70   04/18/17 118/72         Have you received any other medical care this week? no  If yes, where and for what? Have you discontinued or changed any medicine or dose of your medicine since your last visit? no  If yes, where and for what? Diet  How many ounces of calorie-free liquids did you consume each day?  100 oz plus    How many meal replacements did you take each day? 4    Did you have any problems adhering to the program?  no If yes, please explain:       Exercise  Aerobic exercise: 180 min  Resistance exercise: 180 min workouts / week  Any discomfort?  no     If yes, where? Review of Systems  Complete ROS negative except where noted above    Objective  Visit Vitals    /67 (BP 1 Location: Left arm, BP Patient Position: Sitting)    Pulse 61    Temp 97.2 °F (36.2 °C) (Oral)    Resp 12    Ht 5' 5\" (1.651 m)    Wt 207 lb 9.6 oz (94.2 kg)    SpO2 99%    BMI 34.55 kg/m2     No LMP recorded. Patient is not currently having periods (Reason: Polycystic Ovarian Syndrome). Waist Circumference: I personally reviewed patient's Weight Management Doc Flowsheet  Neck Circumference: I personally reviewed patient's Weight Management Doc Flowsheet  Percent Body Fat: I personally reviewed patient's Weight Management Doc Flowsheet    Physical Exam  Appearance: well appearing, obese, A&O, NAD  HEENT:  NC/AT, PERRL, No scleral icterus  Heart:  RRR without M/R/G  Lungs:  CTAB, no rhonchi, rales, or wheezes with good air exchange   Ext:  No LE Edema    Assessment / Plan    Encounter Diagnosis   Name Primary?  Obesity (BMI 30-39. 9) Yes       1.   Weight management improved   Progress was reviewed with patient    2. Labs    Latest results reviewed with patient   Lab slip given to pt for f/up HDL labs    3. Diet regimen   # of meal replacements prescribed: 4   If modified LCD-nutritional guidelines:    Monthly Goal   As below    Medical monitoring schedule:   Weekly BP/Weight checks   Monthly provider appointments          Patient Instructions     Health Maintenance Due   Topic Date Due    DTaP/Tdap/Td series (1 - Tdap) 08/22/2000    PAP AKA CERVICAL CYTOLOGY  08/22/2000     Monthly Goal:    12 lbs weight loss  Continue with activities  Smart choices         Body Mass Index: Care Instructions  Your Care Instructions    Body mass index (BMI) can help you see if your weight is raising your risk for health problems. It uses a formula to compare how much you weigh with how tall you are. · A BMI lower than 18.5 is considered underweight. · A BMI between 18.5 and 24.9 is considered healthy. · A BMI between 25 and 29.9 is considered overweight. A BMI of 30 or higher is considered obese. If your BMI is in the normal range, it means that you have a lower risk for weight-related health problems. If your BMI is in the overweight or obese range, you may be at increased risk for weight-related health problems, such as high blood pressure, heart disease, stroke, arthritis or joint pain, and diabetes. If your BMI is in the underweight range, you may be at increased risk for health problems such as fatigue, lower protection (immunity) against illness, muscle loss, bone loss, hair loss, and hormone problems. BMI is just one measure of your risk for weight-related health problems. You may be at higher risk for health problems if you are not active, you eat an unhealthy diet, or you drink too much alcohol or use tobacco products. Follow-up care is a key part of your treatment and safety.  Be sure to make and go to all appointments, and call your doctor if you are having problems. It's also a good idea to know your test results and keep a list of the medicines you take. How can you care for yourself at home? · Practice healthy eating habits. This includes eating plenty of fruits, vegetables, whole grains, lean protein, and low-fat dairy. · If your doctor recommends it, get more exercise. Walking is a good choice. Bit by bit, increase the amount you walk every day. Try for at least 30 minutes on most days of the week. · Do not smoke. Smoking can increase your risk for health problems. If you need help quitting, talk to your doctor about stop-smoking programs and medicines. These can increase your chances of quitting for good. · Limit alcohol to 2 drinks a day for men and 1 drink a day for women. Too much alcohol can cause health problems. If you have a BMI higher than 25  · Your doctor may do other tests to check your risk for weight-related health problems. This may include measuring the distance around your waist. A waist measurement of more than 40 inches in men or 35 inches in women can increase the risk of weight-related health problems. · Talk with your doctor about steps you can take to stay healthy or improve your health. You may need to make lifestyle changes to lose weight and stay healthy, such as changing your diet and getting regular exercise. If you have a BMI lower than 18.5  · Your doctor may do other tests to check your risk for health problems. · Talk with your doctor about steps you can take to stay healthy or improve your health. You may need to make lifestyle changes to gain or maintain weight and stay healthy, such as getting more healthy foods in your diet and doing exercises to build muscle. Where can you learn more? Go to http://shirley-pawan.info/. Enter S176 in the search box to learn more about \"Body Mass Index: Care Instructions. \"  Current as of: January 23, 2017  Content Version: 11.3  © 4778-8278 [employer] Projects, inMotionNow. Care instructions adapted under license by Hoard (which disclaims liability or warranty for this information). If you have questions about a medical condition or this instruction, always ask your healthcare professional. [address] any warranty or liability for your use of this information. Follow-up Disposition:  Return in about 4 weeks (around 8/9/2017). 10 minutes of the 15 minutes face to face time with Arabella Schmitz consisted of counseling & coordinating and/or discussing treatment plans in reference to her The encounter diagnosis was Obesity (BMI 30-39.9). The patient is to follow up as scheduled and will report to the ED or the office if symptoms change or increase. The patient has voiced understanding and will comply.

## 2017-07-12 NOTE — PATIENT INSTRUCTIONS
Health Maintenance Due   Topic Date Due    DTaP/Tdap/Td series (1 - Tdap) 08/22/2000    PAP AKA CERVICAL CYTOLOGY  08/22/2000     Monthly Goal:    12 lbs weight loss  Continue with activities  Smart choices         Body Mass Index: Care Instructions  Your Care Instructions    Body mass index (BMI) can help you see if your weight is raising your risk for health problems. It uses a formula to compare how much you weigh with how tall you are. · A BMI lower than 18.5 is considered underweight. · A BMI between 18.5 and 24.9 is considered healthy. · A BMI between 25 and 29.9 is considered overweight. A BMI of 30 or higher is considered obese. If your BMI is in the normal range, it means that you have a lower risk for weight-related health problems. If your BMI is in the overweight or obese range, you may be at increased risk for weight-related health problems, such as high blood pressure, heart disease, stroke, arthritis or joint pain, and diabetes. If your BMI is in the underweight range, you may be at increased risk for health problems such as fatigue, lower protection (immunity) against illness, muscle loss, bone loss, hair loss, and hormone problems. BMI is just one measure of your risk for weight-related health problems. You may be at higher risk for health problems if you are not active, you eat an unhealthy diet, or you drink too much alcohol or use tobacco products. Follow-up care is a key part of your treatment and safety. Be sure to make and go to all appointments, and call your doctor if you are having problems. It's also a good idea to know your test results and keep a list of the medicines you take. How can you care for yourself at home? · Practice healthy eating habits. This includes eating plenty of fruits, vegetables, whole grains, lean protein, and low-fat dairy. · If your doctor recommends it, get more exercise. Walking is a good choice. Bit by bit, increase the amount you walk every day.  Try for at least 30 minutes on most days of the week. · Do not smoke. Smoking can increase your risk for health problems. If you need help quitting, talk to your doctor about stop-smoking programs and medicines. These can increase your chances of quitting for good. · Limit alcohol to 2 drinks a day for men and 1 drink a day for women. Too much alcohol can cause health problems. If you have a BMI higher than 25  · Your doctor may do other tests to check your risk for weight-related health problems. This may include measuring the distance around your waist. A waist measurement of more than 40 inches in men or 35 inches in women can increase the risk of weight-related health problems. · Talk with your doctor about steps you can take to stay healthy or improve your health. You may need to make lifestyle changes to lose weight and stay healthy, such as changing your diet and getting regular exercise. If you have a BMI lower than 18.5  · Your doctor may do other tests to check your risk for health problems. · Talk with your doctor about steps you can take to stay healthy or improve your health. You may need to make lifestyle changes to gain or maintain weight and stay healthy, such as getting more healthy foods in your diet and doing exercises to build muscle. Where can you learn more? Go to http://shirley-pawan.info/. Enter S176 in the search box to learn more about \"Body Mass Index: Care Instructions. \"  Current as of: January 23, 2017  Content Version: 11.3  © 3816-8769 [employer]omos. Care instructions adapted under license by Draftstreet (which disclaims liability or warranty for this information). If you have questions about a medical condition or this instruction, always ask your healthcare professional. [address] any warranty or liability for your use of this information.

## 2017-07-19 ENCOUNTER — CLINICAL SUPPORT (OUTPATIENT)
Dept: FAMILY MEDICINE CLINIC | Age: 37
End: 2017-07-19

## 2017-07-19 VITALS
DIASTOLIC BLOOD PRESSURE: 68 MMHG | HEART RATE: 83 BPM | WEIGHT: 206.6 LBS | BODY MASS INDEX: 34.42 KG/M2 | HEIGHT: 65 IN | SYSTOLIC BLOOD PRESSURE: 102 MMHG

## 2017-07-19 DIAGNOSIS — E66.9 OBESITY (BMI 30-39.9): Primary | ICD-10-CM

## 2017-07-19 NOTE — PROGRESS NOTES
Progress Note: Weekly Education Class in the [facility] Weight Loss Program   Is there anything that you or the patient needs to let the [facility] Physician know about? no  Over the past week, have you experienced any side-effects? no    Pablo Barragan is a 39 y.o. female who is enrolled in [facility] Loss Program    Visit Vitals    /68 (BP 1 Location: Right arm, BP Patient Position: Sitting)    Pulse 83    Ht 5' 5\" (1.651 m)    Wt 206 lb 9.6 oz (93.7 kg)    BMI 34.38 kg/m2     Weight Metrics 7/19/2017 7/12/2017 7/12/2017 6/28/2017 6/14/2017 6/7/2017 6/1/2017   Weight 206 lb 9.6 oz - 207 lb 9.6 oz 212 lb 3.2 oz 206 lb 6.4 oz 206 lb -   Waist Measure Inches 37 35.5 - - 34 35 38   Exercise Mins/week - 180 - - - - 180   Body Fat % - 38.8 - - - - 38.8   BMI 34.38 kg/m2 - 34.55 kg/m2 35.31 kg/m2 34.35 kg/m2 34.28 kg/m2 -         Have you received any other medical care this week? no  If yes, where and for what? Have you had any change in your medications since your last visit? no  If yes what? Did you have any problems adhering to the program last week? no  If yes, please explain:       Eating Habits Over Last Week:  Did you take in 64 oz of non-caloric fluids? yes     Did you consume your 4 meal replacements each day?  yes       Physical Activity Over the Past Week:    Aerobic exercise: 150 min  Resistance exercise: 150 min workouts / week

## 2017-07-26 ENCOUNTER — CLINICAL SUPPORT (OUTPATIENT)
Dept: FAMILY MEDICINE CLINIC | Age: 37
End: 2017-07-26

## 2017-07-26 VITALS — BODY MASS INDEX: 34.31 KG/M2 | DIASTOLIC BLOOD PRESSURE: 75 MMHG | SYSTOLIC BLOOD PRESSURE: 116 MMHG | WEIGHT: 206.2 LBS

## 2017-07-26 DIAGNOSIS — E66.9 OBESITY (BMI 30-39.9): Primary | ICD-10-CM

## 2017-07-26 NOTE — PROGRESS NOTES
Progress Note: Weekly Education Class in the [facility] Weight Loss Program   Is there anything that you or the patient needs to let the [facility] Physician know about? no  Over the past week, have you experienced any side-effects? no    Cristian Hardwick is a 39 y.o. female who is enrolled in [facility] Weight Loss Program    Visit Vitals    /75    Wt 206 lb 3.2 oz (93.5 kg)    BMI 34.31 kg/m2     Weight Metrics 7/26/2017 7/19/2017 7/12/2017 7/12/2017 6/28/2017 6/14/2017 6/7/2017   Weight 206 lb 3.2 oz 206 lb 9.6 oz - 207 lb 9.6 oz 212 lb 3.2 oz 206 lb 6.4 oz 206 lb   Waist Measure Inches - 37 35.5 - - 34 35   Exercise Mins/week - - 180 - - - -   Body Fat % - - 38.8 - - - -   BMI 34.31 kg/m2 34.38 kg/m2 - 34.55 kg/m2 35.31 kg/m2 34.35 kg/m2 34.28 kg/m2         Have you received any other medical care this week? no  If yes, where and for what? Have you had any change in your medications since your last visit? no  If yes what? Did you have any problems adhering to the program last week? no  If yes, please explain:       Eating Habits Over Last Week:  Did you take in 64 oz of non-caloric fluids? yes     Did you consume your 4 meal replacements each day?  yes       Physical Activity Over the Past Week:    Aerobic exercise: 180 min  Resistance exercise: 6 days workouts / week

## 2017-08-02 ENCOUNTER — CLINICAL SUPPORT (OUTPATIENT)
Dept: FAMILY MEDICINE CLINIC | Age: 37
End: 2017-08-02

## 2017-08-02 VITALS
HEIGHT: 65 IN | BODY MASS INDEX: 34.16 KG/M2 | HEART RATE: 74 BPM | WEIGHT: 205 LBS | DIASTOLIC BLOOD PRESSURE: 69 MMHG | SYSTOLIC BLOOD PRESSURE: 108 MMHG

## 2017-08-02 DIAGNOSIS — E66.9 OBESITY (BMI 30-39.9): Primary | ICD-10-CM

## 2017-08-02 NOTE — PROGRESS NOTES
Progress Note: Weekly Education Class in the [facility] Weight Loss Program   Is there anything that you or the patient needs to let the [address] [facility] Physician know about? no  Over the past week, have you experienced any side-effects? no    Laurent Mercedes is a 39 y.o. female who is enrolled in [facility] Weight Loss Program    Visit Vitals    /69 (BP 1 Location: Right arm, BP Patient Position: Sitting)    Pulse 74    Ht 5' 5\" (1.651 m)    Wt 205 lb (93 kg)    BMI 34.11 kg/m2     Weight Metrics 8/2/2017 7/26/2017 7/19/2017 7/12/2017 7/12/2017 6/28/2017 6/14/2017   Weight 205 lb 206 lb 3.2 oz 206 lb 9.6 oz - 207 lb 9.6 oz 212 lb 3.2 oz 206 lb 6.4 oz   Waist Measure Inches 35 - 37 35.5 - - 34   Exercise Mins/week - - - 180 - - -   Body Fat % - - - 38.8 - - -   BMI 34.11 kg/m2 34.31 kg/m2 34.38 kg/m2 - 34.55 kg/m2 35.31 kg/m2 34.35 kg/m2         Have you received any other medical care this week? no  If yes, where and for what? Have you had any change in your medications since your last visit? no  If yes what? Did you have any problems adhering to the program last week? no  If yes, please explain:       Eating Habits Over Last Week:  Did you take in 64 oz of non-caloric fluids? yes     Did you consume your 4 meal replacements each day?  yes       Physical Activity Over the Past Week:    Aerobic exercise: 180 min  Resistance exercise: 150 min workouts / week

## 2017-08-10 ENCOUNTER — HOSPITAL ENCOUNTER (OUTPATIENT)
Dept: LAB | Age: 37
Discharge: HOME | End: 2017-08-10
Payer: OTHER GOVERNMENT

## 2017-08-10 ENCOUNTER — OFFICE VISIT (OUTPATIENT)
Dept: INTERNAL MEDICINE CLINIC | Age: 37
End: 2017-08-10

## 2017-08-10 VITALS
HEIGHT: 65 IN | WEIGHT: 201.5 LBS | TEMPERATURE: 98.4 F | HEART RATE: 60 BPM | BODY MASS INDEX: 33.57 KG/M2 | DIASTOLIC BLOOD PRESSURE: 66 MMHG | OXYGEN SATURATION: 99 % | SYSTOLIC BLOOD PRESSURE: 106 MMHG | RESPIRATION RATE: 18 BRPM

## 2017-08-10 DIAGNOSIS — E03.9 ACQUIRED HYPOTHYROIDISM: ICD-10-CM

## 2017-08-10 DIAGNOSIS — E66.9 OBESITY (BMI 30-39.9): ICD-10-CM

## 2017-08-10 DIAGNOSIS — E66.9 OBESITY (BMI 30-39.9): Primary | ICD-10-CM

## 2017-08-10 LAB
ALBUMIN SERPL BCP-MCNC: 4.4 G/DL (ref 3.4–5)
ALBUMIN/GLOB SERPL: 1.3 {RATIO} (ref 0.8–1.7)
ALP SERPL-CCNC: 66 U/L (ref 45–117)
ALT SERPL-CCNC: 31 U/L (ref 13–56)
ANION GAP BLD CALC-SCNC: 7 MMOL/L (ref 3–18)
AST SERPL W P-5'-P-CCNC: 15 U/L (ref 15–37)
BILIRUB SERPL-MCNC: 0.5 MG/DL (ref 0.2–1)
BUN SERPL-MCNC: 17 MG/DL (ref 7–18)
BUN/CREAT SERPL: 22 (ref 12–20)
CALCIUM SERPL-MCNC: 9.1 MG/DL (ref 8.5–10.1)
CHLORIDE SERPL-SCNC: 105 MMOL/L (ref 100–108)
CO2 SERPL-SCNC: 28 MMOL/L (ref 21–32)
CREAT SERPL-MCNC: 0.76 MG/DL (ref 0.6–1.3)
GLOBULIN SER CALC-MCNC: 3.3 G/DL (ref 2–4)
GLUCOSE SERPL-MCNC: 80 MG/DL (ref 74–99)
POTASSIUM SERPL-SCNC: 4.1 MMOL/L (ref 3.5–5.5)
PROT SERPL-MCNC: 7.7 G/DL (ref 6.4–8.2)
SODIUM SERPL-SCNC: 140 MMOL/L (ref 136–145)
T4 FREE SERPL-MCNC: 1.6 NG/DL (ref 0.7–1.5)
TSH SERPL DL<=0.05 MIU/L-ACNC: 0.46 UIU/ML (ref 0.36–3.74)
URATE SERPL-MCNC: 3.1 MG/DL (ref 2.6–7.2)

## 2017-08-10 PROCEDURE — 84550 ASSAY OF BLOOD/URIC ACID: CPT | Performed by: NURSE PRACTITIONER

## 2017-08-10 PROCEDURE — 84439 ASSAY OF FREE THYROXINE: CPT | Performed by: NURSE PRACTITIONER

## 2017-08-10 PROCEDURE — 80053 COMPREHEN METABOLIC PANEL: CPT | Performed by: NURSE PRACTITIONER

## 2017-08-10 PROCEDURE — 84443 ASSAY THYROID STIM HORMONE: CPT | Performed by: NURSE PRACTITIONER

## 2017-08-10 PROCEDURE — 36415 COLL VENOUS BLD VENIPUNCTURE: CPT | Performed by: NURSE PRACTITIONER

## 2017-08-10 NOTE — PROGRESS NOTES
[facility] Progress Note:   F/up Physician Visit    CC: Obesity      Alex Nava is a 39 y.o. female who is here for her  f/up physician visit for the VLCD Program. Kristine Khan has completed 30 weeks of the program to date. She is doing well with compliance, she is working out 5-7 days a week, states ok with slower weight loss, would rather continue with workouts. 6 lbs weight loss since last visit.     Starting weight: 4/15/15 at 298 lbs  Current weight: 201 lbs  Goal weight: 170 lbs      Most recent EKG: 10/14/15 at 194lbs    Weight Metrics 8/10/2017 8/10/2017 8/2/2017 7/26/2017 7/19/2017 7/12/2017 7/12/2017   Weight - 201 lb 8 oz 205 lb 206 lb 3.2 oz 206 lb 9.6 oz - 207 lb 9.6 oz   Waist Measure Inches 37 - 35 - 37 35.5 -   Exercise Mins/week 180 - - - - 180 -   Body Fat % 37.9 - - - - 38.8 -   BMI - 33.53 kg/m2 34.11 kg/m2 34.31 kg/m2 34.38 kg/m2 - 34.55 kg/m2         Current Outpatient Prescriptions   Medication Sig Dispense Refill    cholecalciferol, VITAMIN D3, (VITAMIN D3) 5,000 unit tab tablet Take 5,000 Units by mouth daily.  POLYETHYLENE GLYCOL 3350 (MIRALAX PO) Take  by mouth every other day.  levothyroxine (SYNTHROID) 137 mcg tablet Take 137 mcg by mouth Daily (before breakfast).  Biotin-Silicon Diox-L-Cysteine 5,000 mcg-100 mg- 50 mg tab Take 5,000 mcg by mouth two (2) times a day.  fluticasone-salmeterol (ADVAIR DISKUS) 250-50 mcg/dose diskus inhaler Take 1 Puff by inhalation every twelve (12) hours as needed.  albuterol (PROVENTIL VENTOLIN) 2.5 mg /3 mL (0.083 %) nebulizer solution by Nebulization route once. Indications: ACUTE ASTHMA ATTACK      estrogen, conjugated,-medroxyPROGESTERone (PREMPRO) 0.625-2.5 mg per tablet Take 1 Tab by mouth daily. Participation   Did you attend clinic and class last week?  yes    Review of Systems  Since your last visit, have you experienced any complications? no  If yes, please list:     No CP, SOB, palpitations, lightheadedness, dizziness, constipation. Positives highlight in BOLD. Are you taking an appetite suppressant? no  If so, is there any Chest Pain, Palpitations or Dizziness? BP Readings from Last 10 Encounters:   08/10/17 106/66   08/02/17 108/69   07/26/17 116/75   07/19/17 102/68   07/12/17 106/67   06/29/17 115/77   06/14/17 107/69   06/07/17 102/68   06/01/17 109/72   05/24/17 107/72         Have you received any other medical care this week? no  If yes, where and for what? Have you discontinued or changed any medicine or dose of your medicine since your last visit? no  If yes, where and for what? Diet  How many ounces of calorie-free liquids did you consume each day?  100 oz    How many meal replacements did you take each day? 4    Did you have any problems adhering to the program?  no If yes, please explain:       Exercise  Aerobic exercise: 180 min  Resistance exercise: 180 workouts / week  Any discomfort?  no     If yes, where? Review of Systems  Complete ROS negative except where noted above    Objective  Visit Vitals    /66 (BP 1 Location: Left arm, BP Patient Position: Sitting)    Pulse 60    Temp 98.4 °F (36.9 °C) (Oral)    Resp 18    Ht 5' 5\" (1.651 m)    Wt 201 lb 8 oz (91.4 kg)    SpO2 99%    BMI 33.53 kg/m2     No LMP recorded. Patient is not currently having periods (Reason: Polycystic Ovarian Syndrome). Waist Circumference: I personally reviewed patient's Weight Management Doc Flowsheet  Neck Circumference: I personally reviewed patient's Weight Management Doc Flowsheet  Percent Body Fat: I personally reviewed patient's Weight Management Doc Flowsheet    Physical Exam  Appearance: well appearing, A&O, NAD  HEENT:  NC/AT, PERRL, No scleral icterus  Heart:  RRR without M/R/G  Lungs:  CTAB, no rhonchi, rales, or wheezes with good air exchange   Ext:  No LE Edema    Assessment / Plan    Encounter Diagnoses   Name Primary?     Obesity (BMI 30-39. 9) Yes    Acquired hypothyroidism        1. Weight management improved   Progress was reviewed with patient    2. Labs    Latest results reviewed with patient   Lab slip given to pt for f/up HDL labs    3. Diet regimen   # of meal replacements prescribed: 4   If modified LCD-nutritional guidelines:    Monthly Goal   8 lbs    Medical monitoring schedule:   Weekly BP/Weight checks   Monthly provider appointments          Patient Instructions     Health Maintenance Due   Topic Date Due    DTaP/Tdap/Td series (1 - Tdap) 08/22/2000    PAP AKA CERVICAL CYTOLOGY  08/22/2000    INFLUENZA AGE 9 TO ADULT  08/01/2017     Monthly  Goal           Body Mass Index: Care Instructions  Your Care Instructions    Body mass index (BMI) can help you see if your weight is raising your risk for health problems. It uses a formula to compare how much you weigh with how tall you are. · A BMI lower than 18.5 is considered underweight. · A BMI between 18.5 and 24.9 is considered healthy. · A BMI between 25 and 29.9 is considered overweight. A BMI of 30 or higher is considered obese. If your BMI is in the normal range, it means that you have a lower risk for weight-related health problems. If your BMI is in the overweight or obese range, you may be at increased risk for weight-related health problems, such as high blood pressure, heart disease, stroke, arthritis or joint pain, and diabetes. If your BMI is in the underweight range, you may be at increased risk for health problems such as fatigue, lower protection (immunity) against illness, muscle loss, bone loss, hair loss, and hormone problems. BMI is just one measure of your risk for weight-related health problems. You may be at higher risk for health problems if you are not active, you eat an unhealthy diet, or you drink too much alcohol or use tobacco products. Follow-up care is a key part of your treatment and safety.  Be sure to make and go to all appointments, and call your doctor if you are having problems. It's also a good idea to know your test results and keep a list of the medicines you take. How can you care for yourself at home? · Practice healthy eating habits. This includes eating plenty of fruits, vegetables, whole grains, lean protein, and low-fat dairy. · If your doctor recommends it, get more exercise. Walking is a good choice. Bit by bit, increase the amount you walk every day. Try for at least 30 minutes on most days of the week. · Do not smoke. Smoking can increase your risk for health problems. If you need help quitting, talk to your doctor about stop-smoking programs and medicines. These can increase your chances of quitting for good. · Limit alcohol to 2 drinks a day for men and 1 drink a day for women. Too much alcohol can cause health problems. If you have a BMI higher than 25  · Your doctor may do other tests to check your risk for weight-related health problems. This may include measuring the distance around your waist. A waist measurement of more than 40 inches in men or 35 inches in women can increase the risk of weight-related health problems. · Talk with your doctor about steps you can take to stay healthy or improve your health. You may need to make lifestyle changes to lose weight and stay healthy, such as changing your diet and getting regular exercise. If you have a BMI lower than 18.5  · Your doctor may do other tests to check your risk for health problems. · Talk with your doctor about steps you can take to stay healthy or improve your health. You may need to make lifestyle changes to gain or maintain weight and stay healthy, such as getting more healthy foods in your diet and doing exercises to build muscle. Where can you learn more? Go to http://shirley-pawan.info/. Enter S176 in the search box to learn more about \"Body Mass Index: Care Instructions. \"  Current as of: January 23, 2017  Content Version: 11.3  © 6423-2397 Healthwise, Incorporated. Care instructions adapted under license by [employer] (which disclaims liability or warranty for this information). If you have questions about a medical condition or this instruction, always ask your healthcare professional. [address]  any warranty or liability for your use of this information. Follow-up Disposition:  Return in about 4 weeks (around 9/7/2017). 10 minutes of the 15 minutes face to face time with Bernice Street consisted of counseling & coordinating and/or discussing treatment plans in reference to her The primary encounter diagnosis was Obesity (BMI 30-39.9). A diagnosis of Acquired hypothyroidism was also pertinent to this visit. The patient is to follow up as scheduled and will report to the ED or the office if symptoms change or increase. The patient has voiced understanding and will comply.

## 2017-08-10 NOTE — PATIENT INSTRUCTIONS
Health Maintenance Due   Topic Date Due    DTaP/Tdap/Td series (1 - Tdap) 08/22/2000    PAP AKA CERVICAL CYTOLOGY  08/22/2000    INFLUENZA AGE 9 TO ADULT  08/01/2017     Monthly  Goal    8 lbs weight loss       Body Mass Index: Care Instructions  Your Care Instructions    Body mass index (BMI) can help you see if your weight is raising your risk for health problems. It uses a formula to compare how much you weigh with how tall you are. · A BMI lower than 18.5 is considered underweight. · A BMI between 18.5 and 24.9 is considered healthy. · A BMI between 25 and 29.9 is considered overweight. A BMI of 30 or higher is considered obese. If your BMI is in the normal range, it means that you have a lower risk for weight-related health problems. If your BMI is in the overweight or obese range, you may be at increased risk for weight-related health problems, such as high blood pressure, heart disease, stroke, arthritis or joint pain, and diabetes. If your BMI is in the underweight range, you may be at increased risk for health problems such as fatigue, lower protection (immunity) against illness, muscle loss, bone loss, hair loss, and hormone problems. BMI is just one measure of your risk for weight-related health problems. You may be at higher risk for health problems if you are not active, you eat an unhealthy diet, or you drink too much alcohol or use tobacco products. Follow-up care is a key part of your treatment and safety. Be sure to make and go to all appointments, and call your doctor if you are having problems. It's also a good idea to know your test results and keep a list of the medicines you take. How can you care for yourself at home? · Practice healthy eating habits. This includes eating plenty of fruits, vegetables, whole grains, lean protein, and low-fat dairy. · If your doctor recommends it, get more exercise. Walking is a good choice. Bit by bit, increase the amount you walk every day.  Try for at least 30 minutes on most days of the week. · Do not smoke. Smoking can increase your risk for health problems. If you need help quitting, talk to your doctor about stop-smoking programs and medicines. These can increase your chances of quitting for good. · Limit alcohol to 2 drinks a day for men and 1 drink a day for women. Too much alcohol can cause health problems. If you have a BMI higher than 25  · Your doctor may do other tests to check your risk for weight-related health problems. This may include measuring the distance around your waist. A waist measurement of more than 40 inches in men or 35 inches in women can increase the risk of weight-related health problems. · Talk with your doctor about steps you can take to stay healthy or improve your health. You may need to make lifestyle changes to lose weight and stay healthy, such as changing your diet and getting regular exercise. If you have a BMI lower than 18.5  · Your doctor may do other tests to check your risk for health problems. · Talk with your doctor about steps you can take to stay healthy or improve your health. You may need to make lifestyle changes to gain or maintain weight and stay healthy, such as getting more healthy foods in your diet and doing exercises to build muscle. Where can you learn more? Go to http://shirley-pawan.info/. Enter S176 in the search box to learn more about \"Body Mass Index: Care Instructions. \"  Current as of: January 23, 2017  Content Version: 11.3  © 7614-1377 [employer]. Care instructions adapted under license by [employer] (which disclaims liability or warranty for this information). If you have questions about a medical condition or this instruction, always ask your healthcare professional. [address] any warranty or liability for your use of this information.

## 2017-08-10 NOTE — PROGRESS NOTES
Chief Complaint   Patient presents with    Weight Management     1. Have you been to the ER, urgent care clinic since your last visit? Hospitalized since your last visit? No    2. Have you seen or consulted any other health care providers outside of the [facility] since your last visit? Include any pap smears or colon screening.  No

## 2017-08-10 NOTE — MR AVS SNAPSHOT
Visit Information Date & Time Provider Department Dept. Phone Encounter #  
 8/10/2017  9:30 AM Amee Bowman NP Internist of [facility] [id number] Follow-up Instructions Return in about 4 weeks (around 9/7/2017). Your Appointments 9/13/2017 84:60 AM  
METABOLIC PROGRAM 30 with Amee Bowman NP Internist of [facility] ([facility]) Appt Note: 1 mo fu - [facility]  
 [address] [place] [address] [place]  
  
   
 [address] [facility] Upcoming Health Maintenance Date Due DTaP/Tdap/Td series (1 - Tdap) 8/22/2000 PAP AKA CERVICAL CYTOLOGY 8/22/2000 INFLUENZA AGE 9 TO ADULT 8/1/2017 Allergies as of 8/10/2017  Review Complete On: 8/10/2017 By: Amee Bowman NP Severity Noted Reaction Type Reactions Sulfa (Sulfonamide Antibiotics)  04/15/2015    Rash Current Immunizations  Never Reviewed No immunizations on file. Not reviewed this visit You Were Diagnosed With   
  
 Codes Comments Obesity (BMI 30-39.9)    -  Primary ICD-10-CM: W85.5 ICD-9-CM: 278.00 Acquired hypothyroidism     ICD-10-CM: E03.9 ICD-9-CM: 519. 9 Vitals BP Pulse Temp Resp Height(growth percentile) Weight(growth percentile) 106/66 (BP 1 Location: Left arm, BP Patient Position: Sitting) 60 98.4 °F (36.9 °C) (Oral) 18 5' 5\" (1.651 m) 201 lb 8 oz (91.4 kg) SpO2 BMI OB Status Smoking Status 99% 33.53 kg/m2 Polycystic Ovarian Syndrome Never Smoker BMI and BSA Data Body Mass Index Body Surface Area  
 33.53 kg/m 2 2.05 m 2 Preferred Pharmacy Pharmacy Name Phone [facility] [address] [place] [address] Rd [phone] Your Updated Medication List  
  
   
This list is accurate as of: 8/10/17 10:01 AM.  Always use your most recent med list.  
  
  
  
  
 ADVAIR DISKUS 250-50 mcg/dose diskus inhaler Generic drug:  fluticasone-salmeterol Take 1 Puff by inhalation every twelve (12) hours as needed. albuterol 2.5 mg /3 mL (0.083 %) nebulizer solution Commonly known as:  PROVENTIL VENTOLIN  
by Nebulization route once. Indications: ACUTE ASTHMA ATTACK Biotin-Silicon Diox-L-Cysteine 5,000 mcg -100 mg-50 mg Tab Take 5,000 mcg by mouth two (2) times a day. cholecalciferol (VITAMIN D3) 5,000 unit Tab tablet Commonly known as:  VITAMIN D3 Take 5,000 Units by mouth daily. levothyroxine 137 mcg tablet Commonly known as:  SYNTHROID Take 137 mcg by mouth Daily (before breakfast). MIRALAX PO Take  by mouth every other day. PREMPRO 0.625-2.5 mg per tablet Generic drug:  estrogen (conjugated)-medroxyPROGESTERone Take 1 Tab by mouth daily. Follow-up Instructions Return in about 4 weeks (around 9/7/2017). To-Do List   
 08/10/2017 Lab:  METABOLIC PANEL, COMPREHENSIVE   
  
 08/10/2017 Lab:  T4, FREE   
  
 08/10/2017 Lab:  TSH 3RD GENERATION   
  
 08/10/2017 Lab:  URIC ACID Patient Instructions Health Maintenance Due Topic Date Due  
 DTaP/Tdap/Td series (1 - Tdap) 08/22/2000  PAP AKA CERVICAL CYTOLOGY  08/22/2000  INFLUENZA AGE 9 TO ADULT  08/01/2017 Monthly  Goal 
 
8 lbs weight loss Body Mass Index: Care Instructions Your Care Instructions Body mass index (BMI) can help you see if your weight is raising your risk for health problems. It uses a formula to compare how much you weigh with how tall you are. · A BMI lower than 18.5 is considered underweight. · A BMI between 18.5 and 24.9 is considered healthy. · A BMI between 25 and 29.9 is considered overweight. A BMI of 30 or higher is considered obese. If your BMI is in the normal range, it means that you have a lower risk for weight-related health problems.  If your BMI is in the overweight or obese range, you may be at increased risk for weight-related health problems, such as high blood pressure, heart disease, stroke, arthritis or joint pain, and diabetes. If your BMI is in the underweight range, you may be at increased risk for health problems such as fatigue, lower protection (immunity) against illness, muscle loss, bone loss, hair loss, and hormone problems. BMI is just one measure of your risk for weight-related health problems. You may be at higher risk for health problems if you are not active, you eat an unhealthy diet, or you drink too much alcohol or use tobacco products. Follow-up care is a key part of your treatment and safety. Be sure to make and go to all appointments, and call your doctor if you are having problems. It's also a good idea to know your test results and keep a list of the medicines you take. How can you care for yourself at home? · Practice healthy eating habits. This includes eating plenty of fruits, vegetables, whole grains, lean protein, and low-fat dairy. · If your doctor recommends it, get more exercise. Walking is a good choice. Bit by bit, increase the amount you walk every day. Try for at least 30 minutes on most days of the week. · Do not smoke. Smoking can increase your risk for health problems. If you need help quitting, talk to your doctor about stop-smoking programs and medicines. These can increase your chances of quitting for good. · Limit alcohol to 2 drinks a day for men and 1 drink a day for women. Too much alcohol can cause health problems. If you have a BMI higher than 25 · Your doctor may do other tests to check your risk for weight-related health problems. This may include measuring the distance around your waist. A waist measurement of more than 40 inches in men or 35 inches in women can increase the risk of weight-related health problems.  
· Talk with your doctor about steps you can take to stay healthy or improve your health. You may need to make lifestyle changes to lose weight and stay healthy, such as changing your diet and getting regular exercise. If you have a BMI lower than 18.5 · Your doctor may do other tests to check your risk for health problems. · Talk with your doctor about steps you can take to stay healthy or improve your health. You may need to make lifestyle changes to gain or maintain weight and stay healthy, such as getting more healthy foods in your diet and doing exercises to build muscle. Where can you learn more? Go to http://shirley-pawan.info/. Enter S176 in the search box to learn more about \"Body Mass Index: Care Instructions. \" Current as of: January 23, 2017 Content Version: 11.3 © 6091-9373 [employer]. Care instructions adapted under license by [employer] (which disclaims liability or warranty for this information). If you have questions about a medical condition or this instruction, always ask your healthcare professional. [place] [address] any warranty or liability for your use of this information. Introducing [facility]! Dear Yung Said: Thank you for requesting a ProNoxis account. Our records indicate that you already have an active ProNoxis account. You can access your account anytime at [email or web address] Did you know that you can access your hospital and ER discharge instructions at any time in ProNoxis? You can also review all of your test results from your hospital stay or ER visit. Additional Information If you have questions, please visit the Frequently Asked Questions section of the ProNoxis website at [email or web address] Remember, ProNoxis is NOT to be used for urgent needs. For medical emergencies, dial 911. Now available from your iPhone and Android! Please provide this summary of care documentation to your next provider. Your primary care clinician is listed as [de-identified] M MIRANDA. If you have any questions after today's visit, please call [phone].

## 2017-08-18 ENCOUNTER — CLINICAL SUPPORT (OUTPATIENT)
Dept: FAMILY MEDICINE CLINIC | Age: 37
End: 2017-08-18

## 2017-08-18 VITALS
HEIGHT: 65 IN | WEIGHT: 203.8 LBS | SYSTOLIC BLOOD PRESSURE: 115 MMHG | BODY MASS INDEX: 33.95 KG/M2 | HEART RATE: 64 BPM | DIASTOLIC BLOOD PRESSURE: 73 MMHG

## 2017-08-18 DIAGNOSIS — E66.9 OBESITY (BMI 30-39.9): Primary | ICD-10-CM

## 2017-08-18 NOTE — PROGRESS NOTES
Progress Note: Weekly Education Class in the [facility] Weight Loss Program   Is there anything that you or the patient needs to let the [address] [facility] Physician know about? no  Over the past week, have you experienced any side-effects? no    Theodor Marcial is a 39 y.o. female who is enrolled in [facility] Weight Loss Program    Visit Vitals    /73 (BP 1 Location: Right arm, BP Patient Position: Sitting)    Pulse 64    Ht 5' 5\" (1.651 m)    Wt 203 lb 12.8 oz (92.4 kg)    BMI 33.91 kg/m2     Weight Metrics 8/18/2017 8/10/2017 8/10/2017 8/2/2017 7/26/2017 7/19/2017 7/12/2017   Weight 203 lb 12.8 oz - 201 lb 8 oz 205 lb 206 lb 3.2 oz 206 lb 9.6 oz -   Waist Measure Inches 37 37 - 35 - 37 35.5   Exercise Mins/week - 180 - - - - 180   Body Fat % - 37.9 - - - - 38.8   BMI 33.91 kg/m2 - 33.53 kg/m2 34.11 kg/m2 34.31 kg/m2 34.38 kg/m2 -         Have you received any other medical care this week? no  If yes, where and for what? Have you had any change in your medications since your last visit? no  If yes what? Did you have any problems adhering to the program last week? no  If yes, please explain:       Eating Habits Over Last Week:  Did you take in 64 oz of non-caloric fluids? yes     Did you consume your 4 meal replacements each day?  yes       Physical Activity Over the Past Week:    Aerobic exercise: 195 min  Resistance exercise: 150 min workouts / week

## 2017-09-01 ENCOUNTER — CLINICAL SUPPORT (OUTPATIENT)
Dept: FAMILY MEDICINE CLINIC | Age: 37
End: 2017-09-01

## 2017-09-01 VITALS
WEIGHT: 210.6 LBS | DIASTOLIC BLOOD PRESSURE: 76 MMHG | SYSTOLIC BLOOD PRESSURE: 114 MMHG | HEART RATE: 61 BPM | BODY MASS INDEX: 35.09 KG/M2 | HEIGHT: 65 IN

## 2017-09-01 DIAGNOSIS — E66.9 OBESITY (BMI 30-39.9): Primary | ICD-10-CM

## 2017-09-01 NOTE — PROGRESS NOTES
Progress Note: Weekly Education Class in the [facility]   Is there anything that you or the patient needs to let the [facility] Physician know about? no  Over the past week, have you experienced any side-effects? no    Lilian Gonzalez is a 44 y.o. female who is enrolled in [facility]    Visit Vitals    /76 (BP 1 Location: Right arm, BP Patient Position: Sitting)    Pulse 61    Ht 5' 5\" (1.651 m)    Wt 210 lb 9.6 oz (95.5 kg)    BMI 35.05 kg/m2     Weight Metrics 2017 2017 8/10/2017 8/10/2017 2017 2017 2017   Weight 210 lb 9.6 oz 203 lb 12.8 oz - 201 lb 8 oz 205 lb 206 lb 3.2 oz 206 lb 9.6 oz   Waist Measure Inches 37 37 37 - 35 - 37   Exercise Mins/week - - 180 - - - -   Body Fat % - - 37.9 - - - -   BMI 35.05 kg/m2 33.91 kg/m2 - 33.53 kg/m2 34.11 kg/m2 34.31 kg/m2 34.38 kg/m2         Have you received any other medical care this week? no  If yes, where and for what? Have you had any change in your medications since your last visit? no  If yes what? Did you have any problems adhering to the program last week? yes  If yes, please explain: away last week to [place]. Eating Habits Over Last Week:  Did you take in 64 oz of non-caloric fluids? yes     Did you consume your 4 meal replacements each day?  yes       Physical Activity Over the Past Week:    Aerobic exercise: 180 min  Resistance exercise: 180 min workouts / week

## 2017-09-06 ENCOUNTER — CLINICAL SUPPORT (OUTPATIENT)
Dept: FAMILY MEDICINE CLINIC | Age: 37
End: 2017-09-06

## 2017-09-06 VITALS
HEART RATE: 61 BPM | HEIGHT: 65 IN | WEIGHT: 209.2 LBS | SYSTOLIC BLOOD PRESSURE: 118 MMHG | DIASTOLIC BLOOD PRESSURE: 78 MMHG | BODY MASS INDEX: 34.85 KG/M2

## 2017-09-06 DIAGNOSIS — E66.9 OBESITY (BMI 30-39.9): Primary | ICD-10-CM

## 2017-09-06 NOTE — PROGRESS NOTES
Progress Note: Weekly Education Class in the [facility]   Is there anything that you or the patient needs to let the [facility] Physician know about? no  Over the past week, have you experienced any side-effects? no    Bette Ramires is a 44 y.o. female who is enrolled in [facility]    Visit Vitals    /78 (BP 1 Location: Right arm, BP Patient Position: Sitting)    Pulse 61    Ht 5' 5\" (1.651 m)    Wt 209 lb 3.2 oz (94.9 kg)    BMI 34.81 kg/m2     Weight Metrics 9/6/2017 9/1/2017 8/18/2017 8/10/2017 8/10/2017 8/2/2017 7/26/2017   Weight 209 lb 3.2 oz 210 lb 9.6 oz 203 lb 12.8 oz - 201 lb 8 oz 205 lb 206 lb 3.2 oz   Waist Measure Inches 36 37 37 37 - 35 -   Exercise Mins/week - - - 180 - - -   Body Fat % - - - 37.9 - - -   BMI 34.81 kg/m2 35.05 kg/m2 33.91 kg/m2 - 33.53 kg/m2 34.11 kg/m2 34.31 kg/m2         Have you received any other medical care this week? no  If yes, where and for what? Have you had any change in your medications since your last visit? no  If yes what? Did you have any problems adhering to the program last week? no  If yes, please explain:       Eating Habits Over Last Week:  Did you take in 64 oz of non-caloric fluids? yes     Did you consume your 4 meal replacements each day?  yes       Physical Activity Over the Past Week:    Aerobic exercise: 150 min  Resistance exercise: 150 min workouts / week

## 2017-09-13 ENCOUNTER — OFFICE VISIT (OUTPATIENT)
Dept: INTERNAL MEDICINE CLINIC | Age: 37
End: 2017-09-13

## 2017-09-13 VITALS
TEMPERATURE: 97.2 F | DIASTOLIC BLOOD PRESSURE: 74 MMHG | HEIGHT: 65 IN | WEIGHT: 210.7 LBS | OXYGEN SATURATION: 99 % | HEART RATE: 64 BPM | BODY MASS INDEX: 35.1 KG/M2 | RESPIRATION RATE: 18 BRPM | SYSTOLIC BLOOD PRESSURE: 107 MMHG

## 2017-09-13 DIAGNOSIS — E03.9 ACQUIRED HYPOTHYROIDISM: ICD-10-CM

## 2017-09-13 DIAGNOSIS — E66.9 OBESITY (BMI 30-39.9): Primary | ICD-10-CM

## 2017-09-13 NOTE — MR AVS SNAPSHOT
Visit Information Date & Time Provider Department Dept. Phone Encounter #  
 9/13/2017 10:00 AM Neo Juarez NP Internist of [address] [id number] Follow-up Instructions Return in about 4 weeks (around 10/11/2017). Upcoming Health Maintenance Date Due DTaP/Tdap/Td series (1 - Tdap) 8/22/2000 PAP AKA CERVICAL CYTOLOGY 8/22/2000 INFLUENZA AGE 9 TO ADULT 8/1/2017 Allergies as of 9/13/2017  Review Complete On: 9/13/2017 By: Neo Juarez NP Severity Noted Reaction Type Reactions Sulfa (Sulfonamide Antibiotics)  04/15/2015    Rash Current Immunizations  Never Reviewed No immunizations on file. Not reviewed this visit You Were Diagnosed With   
  
 Codes Comments Obesity (BMI 30-39.9)    -  Primary ICD-10-CM: Z59.4 ICD-9-CM: 278.00 Acquired hypothyroidism     ICD-10-CM: E03.9 ICD-9-CM: 007. 9 Vitals BP Pulse Temp Resp Height(growth percentile) Weight(growth percentile) 107/74 (BP 1 Location: Left arm, BP Patient Position: Sitting) 64 97.2 °F (36.2 °C) (Oral) 18 5' 5\" (1.651 m) 210 lb 11.2 oz (95.6 kg) SpO2 BMI OB Status Smoking Status 99% 35.06 kg/m2 Polycystic Ovarian Syndrome Never Smoker BMI and BSA Data Body Mass Index Body Surface Area 35.06 kg/m 2 2.09 m 2 Preferred Pharmacy Pharmacy Name Phone [id number] [address] [place] [address] Rd [phone] Your Updated Medication List  
  
   
This list is accurate as of: 9/13/17 10:45 AM.  Always use your most recent med list.  
  
  
  
  
 Mindy Jackson 250-50 mcg/dose diskus inhaler Generic drug:  fluticasone-salmeterol Take 1 Puff by inhalation every twelve (12) hours as needed. albuterol 2.5 mg /3 mL (0.083 %) nebulizer solution Commonly known as:  PROVENTIL VENTOLIN  
by Nebulization route once.  Indications: ACUTE ASTHMA ATTACK  
  
 Biotin-Silicon Diox-L-Cysteine 5,000 mcg -100 mg-50 mg Tab Take 5,000 mcg by mouth two (2) times a day. cholecalciferol (VITAMIN D3) 5,000 unit Tab tablet Commonly known as:  VITAMIN D3 Take 5,000 Units by mouth daily. levothyroxine 137 mcg tablet Commonly known as:  SYNTHROID Take 137 mcg by mouth Daily (before breakfast). MIRALAX PO Take  by mouth every other day. PREMPRO 0.625-2.5 mg per tablet Generic drug:  estrogen (conjugated)-medroxyPROGESTERone Take 1 Tab by mouth daily. Follow-up Instructions Return in about 4 weeks (around 10/11/2017). To-Do List   
 09/13/2017 Lab:  METABOLIC PANEL, COMPREHENSIVE   
  
 09/13/2017 Lab:  URIC ACID Patient Instructions Health Maintenance Due Topic Date Due  
 DTaP/Tdap/Td series (1 - Tdap) 08/22/2000  PAP AKA CERVICAL CYTOLOGY  08/22/2000  INFLUENZA AGE 9 TO ADULT  08/01/2017 Switch to LCD 
3 meal replacements + 1 500-600 calorie meal/day. Will build based on exchange. Body Mass Index: Care Instructions Your Care Instructions Body mass index (BMI) can help you see if your weight is raising your risk for health problems. It uses a formula to compare how much you weigh with how tall you are. · A BMI lower than 18.5 is considered underweight. · A BMI between 18.5 and 24.9 is considered healthy. · A BMI between 25 and 29.9 is considered overweight. A BMI of 30 or higher is considered obese. If your BMI is in the normal range, it means that you have a lower risk for weight-related health problems. If your BMI is in the overweight or obese range, you may be at increased risk for weight-related health problems, such as high blood pressure, heart disease, stroke, arthritis or joint pain, and diabetes.  If your BMI is in the underweight range, you may be at increased risk for health problems such as fatigue, lower protection (immunity) against illness, muscle loss, bone loss, hair loss, and hormone problems. BMI is just one measure of your risk for weight-related health problems. You may be at higher risk for health problems if you are not active, you eat an unhealthy diet, or you drink too much alcohol or use tobacco products. Follow-up care is a key part of your treatment and safety. Be sure to make and go to all appointments, and call your doctor if you are having problems. It's also a good idea to know your test results and keep a list of the medicines you take. How can you care for yourself at home? · Practice healthy eating habits. This includes eating plenty of fruits, vegetables, whole grains, lean protein, and low-fat dairy. · If your doctor recommends it, get more exercise. Walking is a good choice. Bit by bit, increase the amount you walk every day. Try for at least 30 minutes on most days of the week. · Do not smoke. Smoking can increase your risk for health problems. If you need help quitting, talk to your doctor about stop-smoking programs and medicines. These can increase your chances of quitting for good. · Limit alcohol to 2 drinks a day for men and 1 drink a day for women. Too much alcohol can cause health problems. If you have a BMI higher than 25 · Your doctor may do other tests to check your risk for weight-related health problems. This may include measuring the distance around your waist. A waist measurement of more than 40 inches in men or 35 inches in women can increase the risk of weight-related health problems. · Talk with your doctor about steps you can take to stay healthy or improve your health. You may need to make lifestyle changes to lose weight and stay healthy, such as changing your diet and getting regular exercise. If you have a BMI lower than 18.5 · Your doctor may do other tests to check your risk for health problems.  
· Talk with your doctor about steps you can take to stay healthy or improve your health. You may need to make lifestyle changes to gain or maintain weight and stay healthy, such as getting more healthy foods in your diet and doing exercises to build muscle. Where can you learn more? Go to http://shirley-pawan.info/. Enter S176 in the search box to learn more about \"Body Mass Index: Care Instructions. \" Current as of: January 23, 2017 Content Version: 11.3 © 0562-2750 [employer]. Care instructions adapted under license by [employer] (which disclaims liability or warranty for this information). If you have questions about a medical condition or this instruction, always ask your healthcare professional. [address] any warranty or liability for your use of this information. Introducing [facility]! Dear Alannah: Thank you for requesting a Joota account. Our records indicate that you already have an active Joota account. You can access your account anytime at [email or web address] Did you know that you can access your hospital and ER discharge instructions at any time in Joota? You can also review all of your test results from your hospital stay or ER visit. Additional Information If you have questions, please visit the Frequently Asked Questions section of the Joota website at [email or web address]. Remember, Joota is NOT to be used for urgent needs. For medical emergencies, dial 911. Now available from your iPhone and Android! Please provide this summary of care documentation to your next provider. Your primary care clinician is listed as Jaison Cormier. If you have any questions after today's visit, please call [phone].

## 2017-09-13 NOTE — PROGRESS NOTES
Chief Complaint   Patient presents with    Weight Management     1. Have you been to the ER, urgent care clinic since your last visit? Hospitalized since your last visit? No    2. Have you seen or consulted any other health care providers outside of the [facility] since your last visit? Include any pap smears or colon screening.  No

## 2017-09-13 NOTE — PROGRESS NOTES
[facility] Progress Note:   F/up Physician Visit    CC: Obesity       Frantz Cedeno is a 44 y.o. female who is here for her  f/up physician visit for the VLCD Program. Valerie Davis has completed 35 weeks of the program to date. 9 lbs weight gain since last visit. Has been struggling back in forth with weight, feeling well, increased energy, working out 4-5 days/week doing increasingly intensive workouts- definitely more than 70% max HR per pt. Discussed options- will try LCD for 1 month then likely transition back to maintenance.     Starting weight: 4/15/15 at 298 lbs  Current weight: 210 lbs  Goal weight: 170 lbs      Most recent EKG: 10/14/15 at 194lbs    Weight Metrics 9/13/2017 9/13/2017 9/6/2017 9/1/2017 8/18/2017 8/10/2017 8/10/2017   Weight - 210 lb 11.2 oz 209 lb 3.2 oz 210 lb 9.6 oz 203 lb 12.8 oz - 201 lb 8 oz   Waist Measure Inches 35 - 36 37 37 37 -   Exercise Mins/week 150 - - - - 180 -   Body Fat % 39.3 - - - - 37.9 -   BMI - 35.06 kg/m2 34.81 kg/m2 35.05 kg/m2 33.91 kg/m2 - 33.53 kg/m2         Current Outpatient Prescriptions   Medication Sig Dispense Refill    cholecalciferol, VITAMIN D3, (VITAMIN D3) 5,000 unit tab tablet Take 5,000 Units by mouth daily.  POLYETHYLENE GLYCOL 3350 (MIRALAX PO) Take  by mouth every other day.  levothyroxine (SYNTHROID) 137 mcg tablet Take 137 mcg by mouth Daily (before breakfast).  Biotin-Silicon Diox-L-Cysteine 5,000 mcg-100 mg- 50 mg tab Take 5,000 mcg by mouth two (2) times a day.  fluticasone-salmeterol (ADVAIR DISKUS) 250-50 mcg/dose diskus inhaler Take 1 Puff by inhalation every twelve (12) hours as needed.  albuterol (PROVENTIL VENTOLIN) 2.5 mg /3 mL (0.083 %) nebulizer solution by Nebulization route once. Indications: ACUTE ASTHMA ATTACK      estrogen, conjugated,-medroxyPROGESTERone (PREMPRO) 0.625-2.5 mg per tablet Take 1 Tab by mouth daily.            Participation   Did you attend clinic and class last week? yes    Review of Systems  Since your last visit, have you experienced any complications? no  If yes, please list:     No CP, SOB, palpitations, lightheadedness, dizziness, constipation. Positives highlight in BOLD. Are you taking an appetite suppressant? no  If so, is there any Chest Pain, Palpitations or Dizziness? BP Readings from Last 10 Encounters:   09/13/17 107/74   09/06/17 118/78   09/01/17 114/76   08/18/17 115/73   08/10/17 106/66   08/02/17 108/69   07/26/17 116/75   07/19/17 102/68   07/12/17 106/67   06/29/17 115/77         Have you received any other medical care this week? no  If yes, where and for what? Have you discontinued or changed any medicine or dose of your medicine since your last visit? no  If yes, where and for what? Diet  How many ounces of calorie-free liquids did you consume each day?  100 oz    How many meal replacements did you take each day? 4    Did you have any problems adhering to the program?  no If yes, please explain:       Exercise  Aerobic exercise: 150 min  Resistance exercise: 150 workouts / week  Any discomfort?  no     If yes, where? Review of Systems  Complete ROS negative except where noted above    Objective  Visit Vitals    /74 (BP 1 Location: Left arm, BP Patient Position: Sitting)    Pulse 64    Temp 97.2 °F (36.2 °C) (Oral)    Resp 18    Ht 5' 5\" (1.651 m)    Wt 210 lb 11.2 oz (95.6 kg)    SpO2 99%    BMI 35.06 kg/m2     No LMP recorded. Patient is not currently having periods (Reason: Polycystic Ovarian Syndrome).     Waist Circumference: I personally reviewed patient's Weight Management Doc Flowsheet  Neck Circumference: I personally reviewed patient's Weight Management Doc Flowsheet  Percent Body Fat: I personally reviewed patient's Weight Management Doc Flowsheet    Physical Exam  Appearance: well appearing, A&O, NAD  HEENT:  NC/AT, PERRL, No scleral icterus  Heart:  RRR without M/R/G  Lungs:  CTAB, no rhonchi, rales, or wheezes with good air exchange   Ext:  No LE Edema    Assessment / Plan    Encounter Diagnoses   Name Primary?  Obesity (BMI 30-39. 9) Yes    Acquired hypothyroidism        1. Weight management control uncertain   Progress was reviewed with patient    2. Labs    Latest results reviewed with patient   Lab slip given to pt for f/up HDL labs    3. Diet regimen   # of meal replacements prescribed: 2-3 + 500-600 calorie meal   If modified LCD-nutritional guidelines:    Monthly Goal   As below    Medical monitoring schedule:   Weekly BP/Weight checks   Monthly provider appointments          Patient Instructions     Health Maintenance Due   Topic Date Due    DTaP/Tdap/Td series (1 - Tdap) 08/22/2000    PAP AKA CERVICAL CYTOLOGY  08/22/2000    INFLUENZA AGE 9 TO ADULT  08/01/2017     Switch to LCD  2-3 meal replacements + 1 500-600 calorie meal/day. Will build based on exchange. Body Mass Index: Care Instructions  Your Care Instructions    Body mass index (BMI) can help you see if your weight is raising your risk for health problems. It uses a formula to compare how much you weigh with how tall you are. · A BMI lower than 18.5 is considered underweight. · A BMI between 18.5 and 24.9 is considered healthy. · A BMI between 25 and 29.9 is considered overweight. A BMI of 30 or higher is considered obese. If your BMI is in the normal range, it means that you have a lower risk for weight-related health problems. If your BMI is in the overweight or obese range, you may be at increased risk for weight-related health problems, such as high blood pressure, heart disease, stroke, arthritis or joint pain, and diabetes. If your BMI is in the underweight range, you may be at increased risk for health problems such as fatigue, lower protection (immunity) against illness, muscle loss, bone loss, hair loss, and hormone problems. BMI is just one measure of your risk for weight-related health problems.  You may be at higher risk for health problems if you are not active, you eat an unhealthy diet, or you drink too much alcohol or use tobacco products. Follow-up care is a key part of your treatment and safety. Be sure to make and go to all appointments, and call your doctor if you are having problems. It's also a good idea to know your test results and keep a list of the medicines you take. How can you care for yourself at home? · Practice healthy eating habits. This includes eating plenty of fruits, vegetables, whole grains, lean protein, and low-fat dairy. · If your doctor recommends it, get more exercise. Walking is a good choice. Bit by bit, increase the amount you walk every day. Try for at least 30 minutes on most days of the week. · Do not smoke. Smoking can increase your risk for health problems. If you need help quitting, talk to your doctor about stop-smoking programs and medicines. These can increase your chances of quitting for good. · Limit alcohol to 2 drinks a day for men and 1 drink a day for women. Too much alcohol can cause health problems. If you have a BMI higher than 25  · Your doctor may do other tests to check your risk for weight-related health problems. This may include measuring the distance around your waist. A waist measurement of more than 40 inches in men or 35 inches in women can increase the risk of weight-related health problems. · Talk with your doctor about steps you can take to stay healthy or improve your health. You may need to make lifestyle changes to lose weight and stay healthy, such as changing your diet and getting regular exercise. If you have a BMI lower than 18.5  · Your doctor may do other tests to check your risk for health problems. · Talk with your doctor about steps you can take to stay healthy or improve your health.  You may need to make lifestyle changes to gain or maintain weight and stay healthy, such as getting more healthy foods in your diet and doing exercises to build muscle. Where can you learn more? Go to http://shirley-pawan.info/. Enter S176 in the search box to learn more about \"Body Mass Index: Care Instructions. \"  Current as of: January 23, 2017  Content Version: 11.3  © 5980-9534 [employer]. Care instructions adapted under license by [employer] (which disclaims liability or warranty for this information). If you have questions about a medical condition or this instruction, always ask your healthcare professional. [place] [address] any warranty or liability for your use of this information. Follow-up Disposition:  Return in about 4 weeks (around 10/11/2017). 10 minutes of the 15 minutes face to face time with Paco Freedman consisted of counseling & coordinating and/or discussing treatment plans in reference to her The primary encounter diagnosis was Obesity (BMI 30-39.9). A diagnosis of Acquired hypothyroidism was also pertinent to this visit. The patient is to follow up as scheduled and will report to the ED or the office if symptoms change or increase. The patient has voiced understanding and will comply.

## 2017-09-13 NOTE — PATIENT INSTRUCTIONS
Health Maintenance Due   Topic Date Due    DTaP/Tdap/Td series (1 - Tdap) 08/22/2000    PAP AKA CERVICAL CYTOLOGY  08/22/2000    INFLUENZA AGE 9 TO ADULT  08/01/2017     Switch to LCD  2-3 meal replacements + 1 500-600 calorie meal/day. Will build based on exchange. Body Mass Index: Care Instructions  Your Care Instructions    Body mass index (BMI) can help you see if your weight is raising your risk for health problems. It uses a formula to compare how much you weigh with how tall you are. · A BMI lower than 18.5 is considered underweight. · A BMI between 18.5 and 24.9 is considered healthy. · A BMI between 25 and 29.9 is considered overweight. A BMI of 30 or higher is considered obese. If your BMI is in the normal range, it means that you have a lower risk for weight-related health problems. If your BMI is in the overweight or obese range, you may be at increased risk for weight-related health problems, such as high blood pressure, heart disease, stroke, arthritis or joint pain, and diabetes. If your BMI is in the underweight range, you may be at increased risk for health problems such as fatigue, lower protection (immunity) against illness, muscle loss, bone loss, hair loss, and hormone problems. BMI is just one measure of your risk for weight-related health problems. You may be at higher risk for health problems if you are not active, you eat an unhealthy diet, or you drink too much alcohol or use tobacco products. Follow-up care is a key part of your treatment and safety. Be sure to make and go to all appointments, and call your doctor if you are having problems. It's also a good idea to know your test results and keep a list of the medicines you take. How can you care for yourself at home? · Practice healthy eating habits. This includes eating plenty of fruits, vegetables, whole grains, lean protein, and low-fat dairy. · If your doctor recommends it, get more exercise.  Walking is a good choice. Bit by bit, increase the amount you walk every day. Try for at least 30 minutes on most days of the week. · Do not smoke. Smoking can increase your risk for health problems. If you need help quitting, talk to your doctor about stop-smoking programs and medicines. These can increase your chances of quitting for good. · Limit alcohol to 2 drinks a day for men and 1 drink a day for women. Too much alcohol can cause health problems. If you have a BMI higher than 25  · Your doctor may do other tests to check your risk for weight-related health problems. This may include measuring the distance around your waist. A waist measurement of more than 40 inches in men or 35 inches in women can increase the risk of weight-related health problems. · Talk with your doctor about steps you can take to stay healthy or improve your health. You may need to make lifestyle changes to lose weight and stay healthy, such as changing your diet and getting regular exercise. If you have a BMI lower than 18.5  · Your doctor may do other tests to check your risk for health problems. · Talk with your doctor about steps you can take to stay healthy or improve your health. You may need to make lifestyle changes to gain or maintain weight and stay healthy, such as getting more healthy foods in your diet and doing exercises to build muscle. Where can you learn more? Go to http://shirley-pawan.info/. Enter S176 in the search box to learn more about \"Body Mass Index: Care Instructions. \"  Current as of: January 23, 2017  Content Version: 11.3  © 4355-2072 [employer]. Care instructions adapted under license by [employer] (which disclaims liability or warranty for this information). If you have questions about a medical condition or this instruction, always ask your healthcare professional. [place] [address] any warranty or liability for your use of this information.

## 2017-09-20 ENCOUNTER — CLINICAL SUPPORT (OUTPATIENT)
Dept: FAMILY MEDICINE CLINIC | Age: 37
End: 2017-09-20

## 2017-09-20 VITALS
WEIGHT: 213.2 LBS | BODY MASS INDEX: 35.52 KG/M2 | HEIGHT: 65 IN | HEART RATE: 79 BPM | SYSTOLIC BLOOD PRESSURE: 109 MMHG | DIASTOLIC BLOOD PRESSURE: 67 MMHG

## 2017-09-20 DIAGNOSIS — E66.9 OBESITY (BMI 30-39.9): Primary | ICD-10-CM

## 2017-09-20 NOTE — PROGRESS NOTES
Progress Note: Weekly Education Class in the [facility] Weight Loss Program   Is there anything that you or the patient needs to let the [facility] Physician know about? no  Over the past week, have you experienced any side-effects? no    Alex Nava is a 44 y.o. female who is enrolled in [facility]    Visit Vitals    /67 (BP 1 Location: Right arm, BP Patient Position: Sitting)    Pulse 79    Ht 5' 5\" (1.651 m)    Wt 213 lb 3.2 oz (96.7 kg)    BMI 35.48 kg/m2     Weight Metrics 9/20/2017 9/13/2017 9/13/2017 9/6/2017 9/1/2017 8/18/2017 8/10/2017   Weight 213 lb 3.2 oz - 210 lb 11.2 oz 209 lb 3.2 oz 210 lb 9.6 oz 203 lb 12.8 oz -   Waist Measure Inches 37 35 - 36 37 37 37   Exercise Mins/week - 150 - - - - 180   Body Fat % - 39.3 - - - - 37.9   BMI 35.48 kg/m2 - 35.06 kg/m2 34.81 kg/m2 35.05 kg/m2 33.91 kg/m2 -         Have you received any other medical care this week? no  If yes, where and for what? Have you had any change in your medications since your last visit? no  If yes what? Did you have any problems adhering to the program last week? no  If yes, please explain:       Eating Habits Over Last Week:  Did you take in 64 oz of non-caloric fluids?  yes     Did you consume your 4 meal replacements each day? no       Physical Activity Over the Past Week:    Aerobic exercise: 180 min  Resistance exercise: 180 min workouts / week

## 2017-09-27 ENCOUNTER — CLINICAL SUPPORT (OUTPATIENT)
Dept: FAMILY MEDICINE CLINIC | Age: 37
End: 2017-09-27

## 2017-09-27 VITALS
WEIGHT: 211.8 LBS | BODY MASS INDEX: 35.29 KG/M2 | HEIGHT: 65 IN | HEART RATE: 72 BPM | DIASTOLIC BLOOD PRESSURE: 73 MMHG | SYSTOLIC BLOOD PRESSURE: 114 MMHG

## 2017-09-27 DIAGNOSIS — E66.9 OBESITY (BMI 30-39.9): Primary | ICD-10-CM

## 2017-10-18 ENCOUNTER — CLINICAL SUPPORT (OUTPATIENT)
Dept: FAMILY MEDICINE CLINIC | Age: 37
End: 2017-10-18

## 2017-10-18 VITALS
DIASTOLIC BLOOD PRESSURE: 79 MMHG | WEIGHT: 218.2 LBS | SYSTOLIC BLOOD PRESSURE: 119 MMHG | HEART RATE: 64 BPM | BODY MASS INDEX: 36.35 KG/M2 | HEIGHT: 65 IN

## 2017-10-18 DIAGNOSIS — E66.9 OBESITY (BMI 30-39.9): Primary | ICD-10-CM

## 2017-10-18 NOTE — PROGRESS NOTES
Progress Note: Weekly Education Class in the [facility] Weight Loss Program   Is there anything that you or the patient needs to let the [facility] Physician know about? no  Over the past week, have you experienced any side-effects? no    Kelly Gonzalez is a 44 y.o. female who is enrolled in [facility] Loss [facility]    Visit Vitals    /79 (BP 1 Location: Right arm, BP Patient Position: Sitting)    Pulse 64    Ht 5' 5\" (1.651 m)    Wt 218 lb 3.2 oz (99 kg)    BMI 36.31 kg/m2     Weight Metrics 10/18/2017 9/27/2017 9/20/2017 9/13/2017 9/13/2017 9/6/2017 9/1/2017   Weight 218 lb 3.2 oz 211 lb 12.8 oz 213 lb 3.2 oz - 210 lb 11.2 oz 209 lb 3.2 oz 210 lb 9.6 oz   Waist Measure Inches 37 38 37 35 - 36 37   Exercise Mins/week - - - 150 - - -   Body Fat % - - - 39.3 - - -   BMI 36.31 kg/m2 35.25 kg/m2 35.48 kg/m2 - 35.06 kg/m2 34.81 kg/m2 35.05 kg/m2         Have you received any other medical care this week? no  If yes, where and for what? Have you had any change in your medications since your last visit? no  If yes what? Did you have any problems adhering to the program last week? no  If yes, please explain:       Eating Habits Over Last Week:  Did you take in 64 oz of non-caloric fluids?  yes     Did you consume your 4 meal replacements each day? no       Physical Activity Over the Past Week:    Aerobic exercise: 120 min  Resistance exercise: 120 min workouts / week

## 2017-10-25 ENCOUNTER — TELEPHONE (OUTPATIENT)
Dept: INTERNAL MEDICINE CLINIC | Age: 37
End: 2017-10-25

## 2017-10-25 ENCOUNTER — OFFICE VISIT (OUTPATIENT)
Dept: INTERNAL MEDICINE CLINIC | Age: 37
End: 2017-10-25

## 2017-10-25 VITALS
WEIGHT: 217.7 LBS | OXYGEN SATURATION: 100 % | DIASTOLIC BLOOD PRESSURE: 72 MMHG | HEART RATE: 71 BPM | BODY MASS INDEX: 36.27 KG/M2 | RESPIRATION RATE: 18 BRPM | HEIGHT: 65 IN | SYSTOLIC BLOOD PRESSURE: 109 MMHG | TEMPERATURE: 97.5 F

## 2017-10-25 DIAGNOSIS — K76.0 FATTY LIVER DISEASE, NONALCOHOLIC: ICD-10-CM

## 2017-10-25 DIAGNOSIS — E66.9 OBESITY (BMI 30-39.9): Primary | ICD-10-CM

## 2017-10-25 DIAGNOSIS — E03.9 ACQUIRED HYPOTHYROIDISM: ICD-10-CM

## 2017-10-25 RX ORDER — TOPIRAMATE 25 MG/1
25 TABLET ORAL 2 TIMES DAILY
Qty: 60 TAB | Refills: 2 | Status: SHIPPED | OUTPATIENT
Start: 2017-10-25 | End: 2018-01-18 | Stop reason: SDUPTHER

## 2017-10-25 NOTE — PROGRESS NOTES
[facility] Progress Note:   F/up Physician Visit    CC: Obesity      Erin Solomon is a 44 y.o. female who is here for her  f/up physician visit for the VLCD Program. Lawanda Rubinstein has completed 41 weeks of the program to date. 7 lbs weight gain since last visit. Feels like she stuck to LCD religiously, had same issue last year, quite frustrated that the only way she seems to even maintain weight is on VLCD. Long discussion about appetite suppressant and qsymia, will try for 1 mo, if maintaining weight will continue at least through holidays and transition to maintenance next visit.       Starting weight: 4/15/15 at 298 lbs  Current weight: 217 lbs  Goal weight: 170 lbs      Most recent EKG: 10/14/15 at 194lbs    Weight Metrics 10/25/2017 10/25/2017 10/18/2017 9/27/2017 9/20/2017 9/13/2017 9/13/2017   Weight - 217 lb 11.2 oz 218 lb 3.2 oz 211 lb 12.8 oz 213 lb 3.2 oz - 210 lb 11.2 oz   Waist Measure Inches 38 - 37 38 37 35 -   Exercise Mins/week 200 - - - - 150 -   Body Fat % 40.3 - - - - 39.3 -   BMI - 36.23 kg/m2 36.31 kg/m2 35.25 kg/m2 35.48 kg/m2 - 35.06 kg/m2         Current Outpatient Prescriptions   Medication Sig Dispense Refill    topiramate ER (TROKENDI XR) 50 mg capsule Take 1 Cap by mouth daily. 30 Cap 0    phentermine 8 mg tab Take 8 mg by mouth daily. Max Daily Amount: 8 mg. Indications: WEIGHT LOSS MANAGEMENT FOR OBESE PATIENT (BMI >= 30) 30 Tab 0    cholecalciferol, VITAMIN D3, (VITAMIN D3) 5,000 unit tab tablet Take 5,000 Units by mouth daily.  POLYETHYLENE GLYCOL 3350 (MIRALAX PO) Take  by mouth every other day.  levothyroxine (SYNTHROID) 137 mcg tablet Take 137 mcg by mouth Daily (before breakfast).  Biotin-Silicon Diox-L-Cysteine 5,000 mcg-100 mg- 50 mg tab Take 5,000 mcg by mouth two (2) times a day.  fluticasone-salmeterol (ADVAIR DISKUS) 250-50 mcg/dose diskus inhaler Take 1 Puff by inhalation every twelve (12) hours as needed.       albuterol (PROVENTIL VENTOLIN) 2.5 mg /3 mL (0.083 %) nebulizer solution by Nebulization route once. Indications: ACUTE ASTHMA ATTACK      estrogen, conjugated,-medroxyPROGESTERone (PREMPRO) 0.625-2.5 mg per tablet Take 1 Tab by mouth daily. Participation   Did you attend clinic and class last week? yes    Review of Systems  Since your last visit, have you experienced any complications? no  If yes, please list:     No CP, SOB, palpitations, lightheadedness, dizziness, constipation. Positives highlight in BOLD. Are you taking an appetite suppressant? no  If so, is there any Chest Pain, Palpitations or Dizziness? BP Readings from Last 10 Encounters:   10/25/17 109/72   10/18/17 119/79   09/27/17 114/73   09/20/17 109/67   09/13/17 107/74   09/06/17 118/78   09/01/17 114/76   08/18/17 115/73   08/10/17 106/66   08/02/17 108/69         Have you received any other medical care this week? no  If yes, where and for what? Have you discontinued or changed any medicine or dose of your medicine since your last visit? no  If yes, where and for what? Diet  How many ounces of calorie-free liquids did you consume each day?  100 oz    How many meal replacements did you take each day? 3 plus 1 outside meal 400-500 calories    Did you have any problems adhering to the program?  no If yes, please explain:       Exercise  Aerobic exercise: 200 min  Resistance exercise: 180 min workouts / week  Any discomfort?  no     If yes, where? Review of Systems  Complete ROS negative except where noted above    Objective  Visit Vitals    /72 (BP 1 Location: Left arm, BP Patient Position: Sitting)    Pulse 71    Temp 97.5 °F (36.4 °C) (Oral)    Resp 18    Ht 5' 5\" (1.651 m)    Wt 217 lb 11.2 oz (98.7 kg)    SpO2 100%    BMI 36.23 kg/m2     No LMP recorded. Patient is not currently having periods (Reason: Polycystic Ovarian Syndrome).     Waist Circumference: I personally reviewed patient's Weight Management Doc Flowsheet  Neck Circumference: I personally reviewed patient's Weight Management Doc Flowsheet  Percent Body Fat: I personally reviewed patient's Weight Management Doc Flowsheet    Physical Exam  Appearance: well appearing, obese, A&O, NAD  HEENT:  NC/AT, PERRL, No scleral icterus  Heart:  RRR without M/R/G  Lungs:  CTAB, no rhonchi, rales, or wheezes with good air exchange   Ext:  No LE Edema    Assessment / Plan    Encounter Diagnoses   Name Primary?  Obesity (BMI 30-39. 9) Yes    Fatty liver disease, nonalcoholic     Acquired hypothyroidism        1. Weight management borderline controlled   Progress was reviewed with patient    2. Labs    Latest results reviewed with patient   Lab slip given to pt for f/up HDL labs    3. Diet regimen   # of meal replacements prescribed: 2-3 + 500 Calorie meal   If modified LCD-nutritional guidelines:    Monthly Goal   As below    Medical monitoring schedule:   Weekly BP/Weight checks   Monthly provider appointments          Patient Instructions     Health Maintenance Due   Topic Date Due    DTaP/Tdap/Td series (1 - Tdap) 08/22/2000    PAP AKA CERVICAL CYTOLOGY  08/22/2000    INFLUENZA AGE 9 TO ADULT  08/01/2017     Take half tab of each medication x 2 weeks, then increase to full tab of each. Monthly goal:    1-2 lbs weight loss/week/maintain weight through holidays. Phentermine/Topiramate (By mouth)   Phentermine Hydrochloride (FEN-ter-meen leila-droe-KLOR-lore), Topiramate (toe-PIR-a-mate)  Used with diet and exercise to help you reach and maintain a healthy weight. Brand Name(s): Qsymia   There may be other brand names for this medicine. When This Medicine Should Not Be Used: This medicine is not right for everyone. Do not use it if you had an allergic reaction to phentermine or topiramate, you are pregnant, or if you have glaucoma or an overactive thyroid. How to Use This Medicine:   Long Acting Capsule  · Take your medicine as directed.  Your dose may need to be changed several times to find what works best for you. Carefully follow your doctor's instructions about diet and exercise. · Take this medicine in the morning, with or without food. It might keep you awake if you take it at night. · Swallow the extended-release capsule whole. Do not crush, break, or chew it. · Drink extra fluids so you will urinate more often and help prevent kidney problems. · This medicine should come with a Medication Guide. Ask your pharmacist for a copy if you do not have one. · Missed dose: If you miss a dose or forget to take your medicine, skip the missed dose. Take your regular dose the next morning. Do not take extra medicine to make up for the missed dose. · Store the medicine in a closed container at room temperature, away from heat, moisture, and direct light. Drugs and Foods to Avoid:   Ask your doctor or pharmacist before using any other medicine, including over-the-counter medicines, vitamins, and herbal products. · Do not use this medicine if you have used an MAO inhibitor (MAOI) within the past 14 days. · Some medicines can affect how phentermine/topiramate works. Tell your doctor if you are using the following:  ¨ Acetazolamide, dichlorphenamide, methazolamide, zonisamide  ¨ Birth control pills  ¨ Other diet pills (including nonprescription or herbal products)  ¨ Diuretic (water pill)  ¨ Medicine to treat seizures (such as valproic acid, carbamazepine, phenytoin)  · Tell your doctor if you use anything else that makes you sleepy. Some examples are allergy medicine, narcotic pain medicine, and alcohol. · Do not drink alcohol while you are using this medicine. Warnings While Using This Medicine:   · It is not safe to take this medicine during pregnancy. It could harm an unborn baby. Tell your doctor right away if you become pregnant. You should have a negative pregnancy test before you start taking this medicine and every month during treatment.  Tell your doctor right away if you miss a period. · Tell your doctor if you are breastfeeding, or if you have kidney disease, liver disease, diabetes, heart failure, heart rhythm problems, or a history of stroke or heart attack. · Rarely, this medicine may increase the risk of suicidal thoughts or actions. Tell your doctor if you have a history of depression or suicide attempts. · This medicine could cause the following problems:   ¨ Vision problems or glaucoma  ¨ Metabolic acidosis (too much acid in the blood)  ¨ An increased heart rate  ¨ Changes in blood sugar levels  ¨ Kidney stones  · This medicine may cause you to feel dizzy, drowsy, or confused, or to have trouble thinking or speaking. Do not drive or do anything else that could be dangerous until you know how this medicine affects you. · Do not stop using this medicine suddenly. Your doctor will need to slowly decrease your dose before you stop it completely. You might have a seizure if you stop taking the medicine too fast.  · Your doctor will do lab tests at regular visits to check on the effects of this medicine. Keep all appointments. · Keep all medicine out of the reach of children. Never share your medicine with anyone.   Possible Side Effects While Using This Medicine:   Call your doctor right away if you notice any of these side effects:  · Allergic reaction: Itching or hives, swelling in your face or hands, swelling or tingling in your mouth or throat, chest tightness, trouble breathing  · Bloody or dark urine, sudden back pain, stomach pain, painful urination  · Decreased sweating, fever, or feeling hot  · Dry mouth, increased thirst, muscle cramps, nausea or vomiting  · Eye pain, vision changes, seeing halos around lights  · Fast breathing, loss of appetite, unusual tiredness  · Fast, pounding, or uneven heartbeat  · Lightheadedness, dizziness, or fainting  · Problems with speech or memory, trouble concentrating, confusion  · Seizures  · Thoughts of hurting yourself, depression, anxiety, trouble sleeping  If you notice these less serious side effects, talk with your doctor:   · Constipation  · Metallic taste in your mouth, dry mouth  · Numbness, tingling, or burning pain in your hands, arms, legs, or feet  If you notice other side effects that you think are caused by this medicine, tell your doctor. Call your doctor for medical advice about side effects. You may report side effects to FDA at 2-119-YAN-4809  © 2017 [address] Information is for End User's use only and may not be sold, redistributed or otherwise used for commercial purposes. The above information is an  only. It is not intended as medical advice for individual conditions or treatments. Talk to your doctor, nurse or pharmacist before following any medical regimen to see if it is safe and effective for you. Body Mass Index: Care Instructions  Your Care Instructions    Body mass index (BMI) can help you see if your weight is raising your risk for health problems. It uses a formula to compare how much you weigh with how tall you are. · A BMI lower than 18.5 is considered underweight. · A BMI between 18.5 and 24.9 is considered healthy. · A BMI between 25 and 29.9 is considered overweight. A BMI of 30 or higher is considered obese. If your BMI is in the normal range, it means that you have a lower risk for weight-related health problems. If your BMI is in the overweight or obese range, you may be at increased risk for weight-related health problems, such as high blood pressure, heart disease, stroke, arthritis or joint pain, and diabetes. If your BMI is in the underweight range, you may be at increased risk for health problems such as fatigue, lower protection (immunity) against illness, muscle loss, bone loss, hair loss, and hormone problems. BMI is just one measure of your risk for weight-related health problems.  You may be at higher risk for health problems if you are not active, you eat an unhealthy diet, or you drink too much alcohol or use tobacco products. Follow-up care is a key part of your treatment and safety. Be sure to make and go to all appointments, and call your doctor if you are having problems. It's also a good idea to know your test results and keep a list of the medicines you take. How can you care for yourself at home? · Practice healthy eating habits. This includes eating plenty of fruits, vegetables, whole grains, lean protein, and low-fat dairy. · If your doctor recommends it, get more exercise. Walking is a good choice. Bit by bit, increase the amount you walk every day. Try for at least 30 minutes on most days of the week. · Do not smoke. Smoking can increase your risk for health problems. If you need help quitting, talk to your doctor about stop-smoking programs and medicines. These can increase your chances of quitting for good. · Limit alcohol to 2 drinks a day for men and 1 drink a day for women. Too much alcohol can cause health problems. If you have a BMI higher than 25  · Your doctor may do other tests to check your risk for weight-related health problems. This may include measuring the distance around your waist. A waist measurement of more than 40 inches in men or 35 inches in women can increase the risk of weight-related health problems. · Talk with your doctor about steps you can take to stay healthy or improve your health. You may need to make lifestyle changes to lose weight and stay healthy, such as changing your diet and getting regular exercise. If you have a BMI lower than 18.5  · Your doctor may do other tests to check your risk for health problems. · Talk with your doctor about steps you can take to stay healthy or improve your health. You may need to make lifestyle changes to gain or maintain weight and stay healthy, such as getting more healthy foods in your diet and doing exercises to build muscle.   Where can you learn more? Go to http://shirley-pawan.info/. Enter S176 in the search box to learn more about \"Body Mass Index: Care Instructions. \"  Current as of: January 23, 2017  Content Version: 11.3  © 3804-7653 [employer]. Care instructions adapted under license by [employer] (which disclaims liability or warranty for this information). If you have questions about a medical condition or this instruction, always ask your healthcare professional. [place] [address] any warranty or liability for your use of this information. Follow-up Disposition:  Return in about 4 weeks (around 11/22/2017). 10 minutes of the 15 minutes face to face time with Lauren Good consisted of counseling & coordinating and/or discussing treatment plans in reference to her The primary encounter diagnosis was Obesity (BMI 30-39.9). Diagnoses of Fatty liver disease, nonalcoholic and Acquired hypothyroidism were also pertinent to this visit. The patient is to follow up as scheduled and will report to the ED or the office if symptoms change or increase. The patient has voiced understanding and will comply.

## 2017-10-25 NOTE — PATIENT INSTRUCTIONS
Health Maintenance Due   Topic Date Due    DTaP/Tdap/Td series (1 - Tdap) 08/22/2000    PAP AKA CERVICAL CYTOLOGY  08/22/2000    INFLUENZA AGE 9 TO ADULT  08/01/2017     Take half tab of each medication x 2 weeks, then increase to full tab of each. Monthly goal:    1-2 lbs weight loss/week/maintain weight through holidays. Phentermine/Topiramate (By mouth)   Phentermine Hydrochloride (FEN-ter-meen leila-droe-KLOR-lore), Topiramate (toe-PIR-a-mate)  Used with diet and exercise to help you reach and maintain a healthy weight. Brand Name(s): Qsymia   There may be other brand names for this medicine. When This Medicine Should Not Be Used: This medicine is not right for everyone. Do not use it if you had an allergic reaction to phentermine or topiramate, you are pregnant, or if you have glaucoma or an overactive thyroid. How to Use This Medicine:   Long Acting Capsule  · Take your medicine as directed. Your dose may need to be changed several times to find what works best for you. Carefully follow your doctor's instructions about diet and exercise. · Take this medicine in the morning, with or without food. It might keep you awake if you take it at night. · Swallow the extended-release capsule whole. Do not crush, break, or chew it. · Drink extra fluids so you will urinate more often and help prevent kidney problems. · This medicine should come with a Medication Guide. Ask your pharmacist for a copy if you do not have one. · Missed dose: If you miss a dose or forget to take your medicine, skip the missed dose. Take your regular dose the next morning. Do not take extra medicine to make up for the missed dose. · Store the medicine in a closed container at room temperature, away from heat, moisture, and direct light. Drugs and Foods to Avoid:   Ask your doctor or pharmacist before using any other medicine, including over-the-counter medicines, vitamins, and herbal products.   · Do not use this medicine if you have used an MAO inhibitor (MAOI) within the past 14 days. · Some medicines can affect how phentermine/topiramate works. Tell your doctor if you are using the following:  ¨ Acetazolamide, dichlorphenamide, methazolamide, zonisamide  ¨ Birth control pills  ¨ Other diet pills (including nonprescription or herbal products)  ¨ Diuretic (water pill)  ¨ Medicine to treat seizures (such as valproic acid, carbamazepine, phenytoin)  · Tell your doctor if you use anything else that makes you sleepy. Some examples are allergy medicine, narcotic pain medicine, and alcohol. · Do not drink alcohol while you are using this medicine. Warnings While Using This Medicine:   · It is not safe to take this medicine during pregnancy. It could harm an unborn baby. Tell your doctor right away if you become pregnant. You should have a negative pregnancy test before you start taking this medicine and every month during treatment. Tell your doctor right away if you miss a period. · Tell your doctor if you are breastfeeding, or if you have kidney disease, liver disease, diabetes, heart failure, heart rhythm problems, or a history of stroke or heart attack. · Rarely, this medicine may increase the risk of suicidal thoughts or actions. Tell your doctor if you have a history of depression or suicide attempts. · This medicine could cause the following problems:   ¨ Vision problems or glaucoma  ¨ Metabolic acidosis (too much acid in the blood)  ¨ An increased heart rate  ¨ Changes in blood sugar levels  ¨ Kidney stones  · This medicine may cause you to feel dizzy, drowsy, or confused, or to have trouble thinking or speaking. Do not drive or do anything else that could be dangerous until you know how this medicine affects you. · Do not stop using this medicine suddenly. Your doctor will need to slowly decrease your dose before you stop it completely.  You might have a seizure if you stop taking the medicine too fast.  · Your doctor will do lab tests at regular visits to check on the effects of this medicine. Keep all appointments. · Keep all medicine out of the reach of children. Never share your medicine with anyone. Possible Side Effects While Using This Medicine:   Call your doctor right away if you notice any of these side effects:  · Allergic reaction: Itching or hives, swelling in your face or hands, swelling or tingling in your mouth or throat, chest tightness, trouble breathing  · Bloody or dark urine, sudden back pain, stomach pain, painful urination  · Decreased sweating, fever, or feeling hot  · Dry mouth, increased thirst, muscle cramps, nausea or vomiting  · Eye pain, vision changes, seeing halos around lights  · Fast breathing, loss of appetite, unusual tiredness  · Fast, pounding, or uneven heartbeat  · Lightheadedness, dizziness, or fainting  · Problems with speech or memory, trouble concentrating, confusion  · Seizures  · Thoughts of hurting yourself, depression, anxiety, trouble sleeping  If you notice these less serious side effects, talk with your doctor:   · Constipation  · Metallic taste in your mouth, dry mouth  · Numbness, tingling, or burning pain in your hands, arms, legs, or feet  If you notice other side effects that you think are caused by this medicine, tell your doctor. Call your doctor for medical advice about side effects. You may report side effects to FDA at 5-667-FDA-3426  © 2017 [facility] Information is for End User's use only and may not be sold, redistributed or otherwise used for commercial purposes. The above information is an  only. It is not intended as medical advice for individual conditions or treatments. Talk to your doctor, nurse or pharmacist before following any medical regimen to see if it is safe and effective for you.        Body Mass Index: Care Instructions  Your Care Instructions    Body mass index (BMI) can help you see if your weight is raising your risk for health problems. It uses a formula to compare how much you weigh with how tall you are. · A BMI lower than 18.5 is considered underweight. · A BMI between 18.5 and 24.9 is considered healthy. · A BMI between 25 and 29.9 is considered overweight. A BMI of 30 or higher is considered obese. If your BMI is in the normal range, it means that you have a lower risk for weight-related health problems. If your BMI is in the overweight or obese range, you may be at increased risk for weight-related health problems, such as high blood pressure, heart disease, stroke, arthritis or joint pain, and diabetes. If your BMI is in the underweight range, you may be at increased risk for health problems such as fatigue, lower protection (immunity) against illness, muscle loss, bone loss, hair loss, and hormone problems. BMI is just one measure of your risk for weight-related health problems. You may be at higher risk for health problems if you are not active, you eat an unhealthy diet, or you drink too much alcohol or use tobacco products. Follow-up care is a key part of your treatment and safety. Be sure to make and go to all appointments, and call your doctor if you are having problems. It's also a good idea to know your test results and keep a list of the medicines you take. How can you care for yourself at home? · Practice healthy eating habits. This includes eating plenty of fruits, vegetables, whole grains, lean protein, and low-fat dairy. · If your doctor recommends it, get more exercise. Walking is a good choice. Bit by bit, increase the amount you walk every day. Try for at least 30 minutes on most days of the week. · Do not smoke. Smoking can increase your risk for health problems. If you need help quitting, talk to your doctor about stop-smoking programs and medicines. These can increase your chances of quitting for good. · Limit alcohol to 2 drinks a day for men and 1 drink a day for women.  Too much alcohol can cause health problems. If you have a BMI higher than 25  · Your doctor may do other tests to check your risk for weight-related health problems. This may include measuring the distance around your waist. A waist measurement of more than 40 inches in men or 35 inches in women can increase the risk of weight-related health problems. · Talk with your doctor about steps you can take to stay healthy or improve your health. You may need to make lifestyle changes to lose weight and stay healthy, such as changing your diet and getting regular exercise. If you have a BMI lower than 18.5  · Your doctor may do other tests to check your risk for health problems. · Talk with your doctor about steps you can take to stay healthy or improve your health. You may need to make lifestyle changes to gain or maintain weight and stay healthy, such as getting more healthy foods in your diet and doing exercises to build muscle. Where can you learn more? Go to http://shirley-pawan.info/. Enter S176 in the search box to learn more about \"Body Mass Index: Care Instructions. \"  Current as of: January 23, 2017  Content Version: 11.3  © 6974-8895 [employer]. Care instructions adapted under license by [employer] (which disclaims liability or warranty for this information). If you have questions about a medical condition or this instruction, always ask your healthcare professional. [address] any warranty or liability for your use of this information.

## 2017-10-25 NOTE — MR AVS SNAPSHOT
Visit Information Date & Time Provider Department Dept. Phone Encounter #  
 10/25/2017 10:00 AM Julia Brown NP [facility] [address] [place] [address] [phone] Follow-up Instructions Return in about 4 weeks (around 11/22/2017). Upcoming Health Maintenance Date Due DTaP/Tdap/Td series (1 - Tdap) 8/22/2000 PAP AKA CERVICAL CYTOLOGY 8/22/2000 INFLUENZA AGE 9 TO ADULT 8/1/2017 Allergies as of 10/25/2017  Review Complete On: 10/25/2017 By: Julia Brown NP Severity Noted Reaction Type Reactions Sulfa (Sulfonamide Antibiotics)  04/15/2015    Rash Current Immunizations  Never Reviewed No immunizations on file. Not reviewed this visit You Were Diagnosed With   
  
 Codes Comments Obesity (BMI 30-39.9)    -  Primary ICD-10-CM: Q76.0 ICD-9-CM: 278.00 Fatty liver disease, nonalcoholic     TTW-47-MY: X04.9 ICD-9-CM: 571.8 Acquired hypothyroidism     ICD-10-CM: E03.9 ICD-9-CM: 168. 9 Vitals BP Pulse Temp Resp Height(growth percentile) Weight(growth percentile) 109/72 (BP 1 Location: Left arm, BP Patient Position: Sitting) 71 97.5 °F (36.4 °C) (Oral) 18 5' 5\" (1.651 m) 217 lb 11.2 oz (98.7 kg) SpO2 BMI OB Status Smoking Status 100% 36.23 kg/m2 Polycystic Ovarian Syndrome Never Smoker BMI and BSA Data Body Mass Index Body Surface Area  
 36.23 kg/m 2 2.13 m 2 Preferred Pharmacy Pharmacy Name Phone [facility] [address] [place] [address] [phone] Your Updated Medication List  
  
   
This list is accurate as of: 10/25/17 11:11 AM.  Always use your most recent med list.  
  
  
  
  
 ADVAIR DISKUS 250-50 mcg/dose diskus inhaler Generic drug:  fluticasone-salmeterol Take 1 Puff by inhalation every twelve (12) hours as needed. albuterol 2.5 mg /3 mL (0.083 %) nebulizer solution Commonly known as:  PROVENTIL VENTOLIN  
 by Nebulization route once. Indications: ACUTE ASTHMA ATTACK Biotin-Silicon Diox-L-Cysteine 5,000 mcg -100 mg-50 mg Tab Take 5,000 mcg by mouth two (2) times a day. cholecalciferol (VITAMIN D3) 5,000 unit Tab tablet Commonly known as:  VITAMIN D3 Take 5,000 Units by mouth daily. levothyroxine 137 mcg tablet Commonly known as:  SYNTHROID Take 137 mcg by mouth Daily (before breakfast). MIRALAX PO Take  by mouth every other day. phentermine 8 mg Tab Take 8 mg by mouth daily. Max Daily Amount: 8 mg. Indications: WEIGHT LOSS MANAGEMENT FOR OBESE PATIENT (BMI >= 30) PREMPRO 0.625-2.5 mg per tablet Generic drug:  estrogen (conjugated)-medroxyPROGESTERone Take 1 Tab by mouth daily. topiramate ER 50 mg capsule Commonly known as:  TROKENDI XR Take 1 Cap by mouth daily. Prescriptions Printed Refills  
 phentermine 8 mg tab 0 Sig: Take 8 mg by mouth daily. Max Daily Amount: 8 mg. Indications: WEIGHT LOSS MANAGEMENT FOR OBESE PATIENT (BMI >= 30) Class: Print Route: Oral  
  
Prescriptions Sent to Pharmacy Refills  
 topiramate ER (TROKENDI XR) 50 mg capsule 0 Sig: Take 1 Cap by mouth daily. Class: Normal  
 Pharmacy: [address] [place] [address] #: [phone] Route: Oral  
  
Follow-up Instructions Return in about 4 weeks (around 11/22/2017). To-Do List   
 10/25/2017 Lab:  T4, FREE   
  
 10/25/2017 Lab:  TSH 3RD GENERATION Patient Instructions Health Maintenance Due Topic Date Due  
 DTaP/Tdap/Td series (1 - Tdap) 08/22/2000  PAP AKA CERVICAL CYTOLOGY  08/22/2000  INFLUENZA AGE 9 TO ADULT  08/01/2017 Take half tab of each medication x 2 weeks, then increase to full tab of each. Monthly goal: 
 
1-2 lbs weight loss/week/maintain weight through holidays. Phentermine/Topiramate (By mouth) Phentermine Hydrochloride (FEN-ter-meen leila-droe-KLOR-lore), Topiramate (toe-PIR-a-mate) Used with diet and exercise to help you reach and maintain a healthy weight. Brand Name(s): Qsymia There may be other brand names for this medicine. When This Medicine Should Not Be Used: This medicine is not right for everyone. Do not use it if you had an allergic reaction to phentermine or topiramate, you are pregnant, or if you have glaucoma or an overactive thyroid. How to Use This Medicine:  
Long Acting Capsule · Take your medicine as directed. Your dose may need to be changed several times to find what works best for you. Carefully follow your doctor's instructions about diet and exercise. · Take this medicine in the morning, with or without food. It might keep you awake if you take it at night. · Swallow the extended-release capsule whole. Do not crush, break, or chew it. · Drink extra fluids so you will urinate more often and help prevent kidney problems. · This medicine should come with a Medication Guide. Ask your pharmacist for a copy if you do not have one. · Missed dose: If you miss a dose or forget to take your medicine, skip the missed dose. Take your regular dose the next morning. Do not take extra medicine to make up for the missed dose. · Store the medicine in a closed container at room temperature, away from heat, moisture, and direct light. Drugs and Foods to Avoid: Ask your doctor or pharmacist before using any other medicine, including over-the-counter medicines, vitamins, and herbal products. · Do not use this medicine if you have used an MAO inhibitor (MAOI) within the past 14 days. · Some medicines can affect how phentermine/topiramate works. Tell your doctor if you are using the following: ¨ Acetazolamide, dichlorphenamide, methazolamide, zonisamide ¨ Birth control pills ¨ Other diet pills (including nonprescription or herbal products) ¨ Diuretic (water pill) ¨ Medicine to treat seizures (such as valproic acid, carbamazepine, phenytoin) · Tell your doctor if you use anything else that makes you sleepy. Some examples are allergy medicine, narcotic pain medicine, and alcohol. · Do not drink alcohol while you are using this medicine. Warnings While Using This Medicine: · It is not safe to take this medicine during pregnancy. It could harm an unborn baby. Tell your doctor right away if you become pregnant. You should have a negative pregnancy test before you start taking this medicine and every month during treatment. Tell your doctor right away if you miss a period. · Tell your doctor if you are breastfeeding, or if you have kidney disease, liver disease, diabetes, heart failure, heart rhythm problems, or a history of stroke or heart attack. · Rarely, this medicine may increase the risk of suicidal thoughts or actions. Tell your doctor if you have a history of depression or suicide attempts. · This medicine could cause the following problems: ¨ Vision problems or glaucoma ¨ Metabolic acidosis (too much acid in the blood) ¨ An increased heart rate ¨ Changes in blood sugar levels ¨ Kidney stones · This medicine may cause you to feel dizzy, drowsy, or confused, or to have trouble thinking or speaking. Do not drive or do anything else that could be dangerous until you know how this medicine affects you. · Do not stop using this medicine suddenly. Your doctor will need to slowly decrease your dose before you stop it completely. You might have a seizure if you stop taking the medicine too fast. 
· Your doctor will do lab tests at regular visits to check on the effects of this medicine. Keep all appointments. · Keep all medicine out of the reach of children. Never share your medicine with anyone. Possible Side Effects While Using This Medicine:  
Call your doctor right away if you notice any of these side effects: · Allergic reaction: Itching or hives, swelling in your face or hands, swelling or tingling in your mouth or throat, chest tightness, trouble breathing · Bloody or dark urine, sudden back pain, stomach pain, painful urination · Decreased sweating, fever, or feeling hot · Dry mouth, increased thirst, muscle cramps, nausea or vomiting · Eye pain, vision changes, seeing halos around lights · Fast breathing, loss of appetite, unusual tiredness · Fast, pounding, or uneven heartbeat · Lightheadedness, dizziness, or fainting · Problems with speech or memory, trouble concentrating, confusion · Seizures · Thoughts of hurting yourself, depression, anxiety, trouble sleeping If you notice these less serious side effects, talk with your doctor: · Constipation · Metallic taste in your mouth, dry mouth · Numbness, tingling, or burning pain in your hands, arms, legs, or feet If you notice other side effects that you think are caused by this medicine, tell your doctor. Call your doctor for medical advice about side effects. You may report side effects to FDA at 6-691-VPO-8300 © 2017 [facility] Information is for End User's use only and may not be sold, redistributed or otherwise used for commercial purposes. The above information is an  only. It is not intended as medical advice for individual conditions or treatments. Talk to your doctor, nurse or pharmacist before following any medical regimen to see if it is safe and effective for you. Body Mass Index: Care Instructions Your Care Instructions Body mass index (BMI) can help you see if your weight is raising your risk for health problems. It uses a formula to compare how much you weigh with how tall you are. · A BMI lower than 18.5 is considered underweight. · A BMI between 18.5 and 24.9 is considered healthy. · A BMI between 25 and 29.9 is considered overweight. A BMI of 30 or higher is considered obese. If your BMI is in the normal range, it means that you have a lower risk for weight-related health problems. If your BMI is in the overweight or obese range, you may be at increased risk for weight-related health problems, such as high blood pressure, heart disease, stroke, arthritis or joint pain, and diabetes. If your BMI is in the underweight range, you may be at increased risk for health problems such as fatigue, lower protection (immunity) against illness, muscle loss, bone loss, hair loss, and hormone problems. BMI is just one measure of your risk for weight-related health problems. You may be at higher risk for health problems if you are not active, you eat an unhealthy diet, or you drink too much alcohol or use tobacco products. Follow-up care is a key part of your treatment and safety. Be sure to make and go to all appointments, and call your doctor if you are having problems. It's also a good idea to know your test results and keep a list of the medicines you take. How can you care for yourself at home? · Practice healthy eating habits. This includes eating plenty of fruits, vegetables, whole grains, lean protein, and low-fat dairy. · If your doctor recommends it, get more exercise. Walking is a good choice. Bit by bit, increase the amount you walk every day. Try for at least 30 minutes on most days of the week. · Do not smoke. Smoking can increase your risk for health problems. If you need help quitting, talk to your doctor about stop-smoking programs and medicines. These can increase your chances of quitting for good. · Limit alcohol to 2 drinks a day for men and 1 drink a day for women. Too much alcohol can cause health problems. If you have a BMI higher than 25 · Your doctor may do other tests to check your risk for weight-related health problems.  This may include measuring the distance around your waist. A waist measurement of more than 40 inches in men or 35 inches in women can increase the risk of weight-related health problems. · Talk with your doctor about steps you can take to stay healthy or improve your health. You may need to make lifestyle changes to lose weight and stay healthy, such as changing your diet and getting regular exercise. If you have a BMI lower than 18.5 · Your doctor may do other tests to check your risk for health problems. · Talk with your doctor about steps you can take to stay healthy or improve your health. You may need to make lifestyle changes to gain or maintain weight and stay healthy, such as getting more healthy foods in your diet and doing exercises to build muscle. Where can you learn more? Go to http://shirley-pawan.info/. Enter S176 in the search box to learn more about \"Body Mass Index: Care Instructions. \" Current as of: January 23, 2017 Content Version: 11.3 © 7622-7483 [employer]. Care instructions adapted under license by [employer] (which disclaims liability or warranty for this information). If you have questions about a medical condition or this instruction, always ask your healthcare professional. [address] any warranty or liability for your use of this information. Introducing [facility]! Dear Wei Hicks: Thank you for requesting a [employer] account. Our records indicate that you already have an active [employer] account. You can access your account anytime at [email or web address] Did you know that you can access your hospital and ER discharge instructions at any time in [employer]? You can also review all of your test results from your hospital stay or ER visit. Additional Information If you have questions, please visit the Frequently Asked Questions section of the [employer] website at [email or web address] Remember, [employer]ar is NOT to be used for urgent needs. For medical emergencies, dial 911. Now available from your iPhone and Android! Please provide this summary of care documentation to your next provider. Your primary care clinician is listed as Reeda Marker. If you have any questions after today's visit, please call [phone].

## 2017-10-25 NOTE — PROGRESS NOTES
Chief Complaint   Patient presents with    Weight Management     1. Have you been to the ER, urgent care clinic since your last visit? Hospitalized since your last visit? No    2. Have you seen or consulted any other health care providers outside of the [address] [place] [address] since your last visit? Include any pap smears or colon screening.  No

## 2017-10-25 NOTE — TELEPHONE ENCOUNTER
Chief Complaint   Patient presents with    Medication Refill     per EMILY Blair patient is to  prescription for Topamax 25 mg take twice a day     At 1:52 pm I spoke directly with the patient and informed her that her insurance would not cover the Topamax 50 mg tablet, so EMILY Blair has called in to your pharmacy Topamax 25 mg one tablet twice a day as directed. The patient states she understands all.

## 2017-11-01 ENCOUNTER — CLINICAL SUPPORT (OUTPATIENT)
Dept: FAMILY MEDICINE CLINIC | Age: 37
End: 2017-11-01

## 2017-11-01 VITALS
BODY MASS INDEX: 36.42 KG/M2 | HEIGHT: 65 IN | HEART RATE: 74 BPM | DIASTOLIC BLOOD PRESSURE: 76 MMHG | WEIGHT: 218.6 LBS | SYSTOLIC BLOOD PRESSURE: 115 MMHG

## 2017-11-01 DIAGNOSIS — E66.9 OBESITY (BMI 30-39.9): Primary | ICD-10-CM

## 2017-11-01 NOTE — PROGRESS NOTES
Progress Note: Weekly Education Class in the [facility]   Is there anything that you or the patient needs to let the [facility] Physician know about? no  Over the past week, have you experienced any side-effects? no    Mary Robledo is a 44 y.o. female who is enrolled in [facility]    Visit Vitals    /76 (BP 1 Location: Right arm, BP Patient Position: Sitting)    Pulse 74    Ht 5' 5\" (1.651 m)    Wt 218 lb 9.6 oz (99.2 kg)    BMI 36.38 kg/m2     Weight Metrics 11/1/2017 10/25/2017 10/25/2017 10/18/2017 9/27/2017 9/20/2017 9/13/2017   Weight 218 lb 9.6 oz - 217 lb 11.2 oz 218 lb 3.2 oz 211 lb 12.8 oz 213 lb 3.2 oz -   Waist Measure Inches 38 38 - 37 38 37 35   Exercise Mins/week - 200 - - - - 150   Body Fat % - 40.3 - - - - 39.3   BMI 36.38 kg/m2 - 36.23 kg/m2 36.31 kg/m2 35.25 kg/m2 35.48 kg/m2 -         Have you received any other medical care this week? no  If yes, where and for what? Have you had any change in your medications since your last visit? no  If yes what? Did you have any problems adhering to the program last week? no  If yes, please explain:       Eating Habits Over Last Week:  Did you take in 64 oz of non-caloric fluids? no     Did you consume your 4 meal replacements each day?  yes       Physical Activity Over the Past Week:    Aerobic exercise: 210 min  Resistance exercise: 140 min workouts / week

## 2017-11-08 ENCOUNTER — CLINICAL SUPPORT (OUTPATIENT)
Dept: FAMILY MEDICINE CLINIC | Age: 37
End: 2017-11-08

## 2017-11-08 DIAGNOSIS — E66.9 OBESITY (BMI 30-39.9): Primary | ICD-10-CM

## 2017-11-09 VITALS
HEIGHT: 65 IN | SYSTOLIC BLOOD PRESSURE: 116 MMHG | DIASTOLIC BLOOD PRESSURE: 78 MMHG | BODY MASS INDEX: 36.42 KG/M2 | HEART RATE: 78 BPM | WEIGHT: 218.6 LBS

## 2017-11-09 NOTE — PROGRESS NOTES
Progress Note: Weekly Education Class in the [facility]   Is there anything that you or the patient needs to let the [facility] Physician know about? no  Over the past week, have you experienced any side-effects? no    Chalino Ennis is a 44 y.o. female who is enrolled in [facility]    Visit Vitals    /78 (BP 1 Location: Right arm, BP Patient Position: Sitting)    Pulse 78    Ht 5' 5\" (1.651 m)    Wt 218 lb 9.6 oz (99.2 kg)    BMI 36.38 kg/m2     Weight Metrics 11/9/2017 11/8/2017 11/1/2017 10/25/2017 10/25/2017 10/18/2017 9/27/2017   Weight - 218 lb 9.6 oz 218 lb 9.6 oz - 217 lb 11.2 oz 218 lb 3.2 oz 211 lb 12.8 oz   Waist Measure Inches 37 - 38 38 - 37 38   Exercise Mins/week - - - 200 - - -   Body Fat % - - - 40.3 - - -   BMI - 36.38 kg/m2 36.38 kg/m2 - 36.23 kg/m2 36.31 kg/m2 35.25 kg/m2         Have you received any other medical care this week? no  If yes, where and for what? Have you had any change in your medications since your last visit? no  If yes what? Did you have any problems adhering to the program last week? no  If yes, please explain:       Eating Habits Over Last Week:  Did you take in 64 oz of non-caloric fluids? yes     Did you consume your 4 meal replacements each day?  Yes with 4 protein 1 cup Hyrum or 2 cup spinach, or 2 cup veggies      Physical Activity Over the Past Week:    Aerobic exercise: 150 min  Resistance exercise: 150 min workouts / week

## 2017-11-22 ENCOUNTER — OFFICE VISIT (OUTPATIENT)
Dept: INTERNAL MEDICINE CLINIC | Age: 37
End: 2017-11-22

## 2017-11-22 VITALS
TEMPERATURE: 97 F | HEIGHT: 65 IN | HEART RATE: 68 BPM | OXYGEN SATURATION: 100 % | WEIGHT: 218 LBS | BODY MASS INDEX: 36.32 KG/M2 | RESPIRATION RATE: 12 BRPM | DIASTOLIC BLOOD PRESSURE: 75 MMHG | SYSTOLIC BLOOD PRESSURE: 115 MMHG

## 2017-11-22 DIAGNOSIS — E66.9 OBESITY (BMI 30-39.9): Primary | ICD-10-CM

## 2017-11-22 NOTE — PROGRESS NOTES
[facility] Progress Note:   F/up Physician Visit    CC: Teetee Ackerman is a 44 y.o. female who is here for her  f/up physician visit for the [facility] Program. Tala Arreola has completed 45 weeks of the program to date. 1 lb weight gain    Starting weight: 4/15/15 at 298 lbs  Current weight: 218 lbs  Goal weight: 170 lbs      Most recent EKG: 10/14/15 at 194lbs    Weight Metrics 11/22/2017 11/22/2017 11/9/2017 11/8/2017 11/1/2017 10/25/2017 10/25/2017   Weight - 218 lb - 218 lb 9.6 oz 218 lb 9.6 oz - 217 lb 11.2 oz   Waist Measure Inches 38 - 37 - 38 38 -   Exercise Mins/week 180 - - - - 200 -   Body Fat % 40.3 - - - - 40.3 -   BMI - 36.28 kg/m2 - 36.38 kg/m2 36.38 kg/m2 - 36.23 kg/m2         Current Outpatient Prescriptions   Medication Sig Dispense Refill    phentermine 15 mg TbDi Take 1 Tab by mouth daily. Max Daily Amount: 1 Tab. 30 Tab 0    topiramate (TOPAMAX) 25 mg tablet Take 1 Tab by mouth two (2) times a day. 60 Tab 2    cholecalciferol, VITAMIN D3, (VITAMIN D3) 5,000 unit tab tablet Take 5,000 Units by mouth daily.  POLYETHYLENE GLYCOL 3350 (MIRALAX PO) Take  by mouth every other day.  levothyroxine (SYNTHROID) 137 mcg tablet Take 137 mcg by mouth Daily (before breakfast).  Biotin-Silicon Diox-L-Cysteine 5,000 mcg-100 mg- 50 mg tab Take 5,000 mcg by mouth two (2) times a day.  fluticasone-salmeterol (ADVAIR DISKUS) 250-50 mcg/dose diskus inhaler Take 1 Puff by inhalation every twelve (12) hours as needed.  albuterol (PROVENTIL VENTOLIN) 2.5 mg /3 mL (0.083 %) nebulizer solution by Nebulization route once. Indications: ACUTE ASTHMA ATTACK      estrogen, conjugated,-medroxyPROGESTERone (PREMPRO) 0.625-2.5 mg per tablet Take 1 Tab by mouth daily.            Participation   Did you attend clinic and class last week? no    Review of Systems  Since your last visit, have you experienced any complications? no  If yes, please list:     No CP, SOB, palpitations, lightheadedness, dizziness, constipation. Positives highlight in BOLD. Are you taking an appetite suppressant? yes  If so, is there any Chest Pain, Palpitations or Dizziness? No  BP Readings from Last 10 Encounters:   11/22/17 115/75   11/09/17 116/78   11/01/17 115/76   10/25/17 109/72   10/18/17 119/79   09/27/17 114/73   09/20/17 109/67   09/13/17 107/74   09/06/17 118/78   09/01/17 114/76         Have you received any other medical care this week? no  If yes, where and for what? Have you discontinued or changed any medicine or dose of your medicine since your last visit? no  If yes, where and for what? Diet  How many ounces of calorie-free liquids did you consume each day?  72 oz    How many meal replacements did you take each day? 3 plus 1 outside meal 400 calories    Did you have any problems adhering to the program?  no If yes, please explain:       Exercise  Aerobic exercise: 180 min  Resistance exercise: 150 workouts / week  Any discomfort?  no     If yes, where? Review of Systems  Complete ROS negative except where noted above    Objective  Visit Vitals    /75 (BP 1 Location: Right arm, BP Patient Position: Sitting)    Pulse 68    Temp 97 °F (36.1 °C) (Oral)    Resp 12    Ht 5' 5\" (1.651 m)    Wt 218 lb (98.9 kg)    SpO2 100%    BMI 36.28 kg/m2     No LMP recorded. Patient is not currently having periods (Reason: Polycystic Ovarian Syndrome).     Waist Circumference: I personally reviewed patient's Weight Management Doc Flowsheet  Neck Circumference: I personally reviewed patient's Weight Management Doc Flowsheet  Percent Body Fat: I personally reviewed patient's Weight Management Doc Flowsheet    Physical Exam  Appearance: well appearing, obese, A&O, NAD  HEENT:  NC/AT, PERRL, No scleral icterus  Heart:  RRR without M/R/G  Lungs:  CTAB, no rhonchi, rales, or wheezes with good air exchange   Ext:  No LE Edema    Assessment / Plan    Encounter Diagnosis Name Primary?  Obesity (BMI 30-39. 9) Yes       1. Weight management stable   Progress was reviewed with patient    2. Labs    Latest results reviewed with patient   Lab slip given to pt for f/up HDL labs    3. Diet regimen   # of meal replacements prescribed: 3 + 500 calories    If modified LCD-nutritional guidelines:    Monthly Goal   As below    Medical monitoring schedule:   Weekly BP/Weight checks   Monthly provider appointments          Patient Instructions     Health Maintenance Due   Topic Date Due    DTaP/Tdap/Td series (1 - Tdap) 08/22/2000    PAP AKA CERVICAL CYTOLOGY  08/22/2000    Influenza Age 9 to Adult  08/01/2017     Monthly goal:    5 lbs weight loss, work on evening appetite suppression. Body Mass Index: Care Instructions  Your Care Instructions    Body mass index (BMI) can help you see if your weight is raising your risk for health problems. It uses a formula to compare how much you weigh with how tall you are. · A BMI lower than 18.5 is considered underweight. · A BMI between 18.5 and 24.9 is considered healthy. · A BMI between 25 and 29.9 is considered overweight. A BMI of 30 or higher is considered obese. If your BMI is in the normal range, it means that you have a lower risk for weight-related health problems. If your BMI is in the overweight or obese range, you may be at increased risk for weight-related health problems, such as high blood pressure, heart disease, stroke, arthritis or joint pain, and diabetes. If your BMI is in the underweight range, you may be at increased risk for health problems such as fatigue, lower protection (immunity) against illness, muscle loss, bone loss, hair loss, and hormone problems. BMI is just one measure of your risk for weight-related health problems. You may be at higher risk for health problems if you are not active, you eat an unhealthy diet, or you drink too much alcohol or use tobacco products.   Follow-up care is a key part of your treatment and safety. Be sure to make and go to all appointments, and call your doctor if you are having problems. It's also a good idea to know your test results and keep a list of the medicines you take. How can you care for yourself at home? · Practice healthy eating habits. This includes eating plenty of fruits, vegetables, whole grains, lean protein, and low-fat dairy. · If your doctor recommends it, get more exercise. Walking is a good choice. Bit by bit, increase the amount you walk every day. Try for at least 30 minutes on most days of the week. · Do not smoke. Smoking can increase your risk for health problems. If you need help quitting, talk to your doctor about stop-smoking programs and medicines. These can increase your chances of quitting for good. · Limit alcohol to 2 drinks a day for men and 1 drink a day for women. Too much alcohol can cause health problems. If you have a BMI higher than 25  · Your doctor may do other tests to check your risk for weight-related health problems. This may include measuring the distance around your waist. A waist measurement of more than 40 inches in men or 35 inches in women can increase the risk of weight-related health problems. · Talk with your doctor about steps you can take to stay healthy or improve your health. You may need to make lifestyle changes to lose weight and stay healthy, such as changing your diet and getting regular exercise. If you have a BMI lower than 18.5  · Your doctor may do other tests to check your risk for health problems. · Talk with your doctor about steps you can take to stay healthy or improve your health. You may need to make lifestyle changes to gain or maintain weight and stay healthy, such as getting more healthy foods in your diet and doing exercises to build muscle. Where can you learn more? Go to http://shirley-pawan.info/.   Enter S176 in the search box to learn more about \"Body Mass Index: Care Instructions. \"  Current as of: October 13, 2016  Content Version: 11.4  © 5646-9880 Healthwise, [employer]. Care instructions adapted under license by [employer] (which disclaims liability or warranty for this information). If you have questions about a medical condition or this instruction, always ask your healthcare professional. [address] any warranty or liability for your use of this information. Follow-up Disposition: Not on File      10 minutes of the 15 minutes face to face time with Fatoumata Vásquez consisted of counseling & coordinating and/or discussing treatment plans in reference to her The encounter diagnosis was Obesity (BMI 30-39.9). The patient is to follow up as scheduled and will report to the ED or the office if symptoms change or increase. The patient has voiced understanding and will comply.

## 2017-11-22 NOTE — MR AVS SNAPSHOT
Visit Information Date & Time Provider Department Dept. Phone Encounter #  
 11/22/2017  2:30 PM Yessica Kumar NP [facility] [address] [place] [address] [phone] [id number] Follow-up Instructions Return in about 4 weeks (around 12/20/2017). Your Appointments 12/21/2017  4:34 AM  
METABOLIC PROGRAM 15 with Yessica Kumar NP [facility] [address] [place] [address] ([address]) Appt Note: metabolic program alfredo [address], [address] [address] [address]  
  
   
 [address], [address] Upcoming Health Maintenance Date Due DTaP/Tdap/Td series (1 - Tdap) 8/22/2000 PAP AKA CERVICAL CYTOLOGY 8/22/2000 Influenza Age 5 to Adult 8/1/2017 Allergies as of 11/22/2017  Review Complete On: 11/22/2017 By: Yessica Kumar NP Severity Noted Reaction Type Reactions Sulfa (Sulfonamide Antibiotics)  04/15/2015    Rash Current Immunizations  Never Reviewed No immunizations on file. Not reviewed this visit You Were Diagnosed With   
  
 Codes Comments Obesity (BMI 30-39.9)    -  Primary ICD-10-CM: H57.9 ICD-9-CM: 278.00 Vitals BP Pulse Temp Resp Height(growth percentile) Weight(growth percentile) 115/75 (BP 1 Location: Right arm, BP Patient Position: Sitting) 68 97 °F (36.1 °C) (Oral) 12 5' 5\" (1.651 m) 218 lb (98.9 kg) SpO2 BMI OB Status Smoking Status 100% 36.28 kg/m2 Polycystic Ovarian Syndrome Never Smoker BMI and BSA Data Body Mass Index Body Surface Area  
 36.28 kg/m 2 2.13 m 2 Preferred Pharmacy Pharmacy Name Phone [facility], [address] [place] [address] Rd [phone] Your Updated Medication List  
  
   
This list is accurate as of: 11/22/17  3:13 PM.  Always use your most recent med list.  
  
  
  
  
 Jenny Avendano 250-50 mcg/dose diskus inhaler Generic drug:  fluticasone-salmeterol Take 1 Puff by inhalation every twelve (12) hours as needed. albuterol 2.5 mg /3 mL (0.083 %) nebulizer solution Commonly known as:  PROVENTIL VENTOLIN  
by Nebulization route once. Indications: ACUTE ASTHMA ATTACK Biotin-Silicon Diox-L-Cysteine 5,000 mcg -100 mg-50 mg Tab Take 5,000 mcg by mouth two (2) times a day. cholecalciferol (VITAMIN D3) 5,000 unit Tab tablet Commonly known as:  VITAMIN D3 Take 5,000 Units by mouth daily. levothyroxine 137 mcg tablet Commonly known as:  SYNTHROID Take 137 mcg by mouth Daily (before breakfast). MIRALAX PO Take  by mouth every other day. phentermine 15 mg Tbdi Take 1 Tab by mouth daily. Max Daily Amount: 1 Tab. PREMPRO 0.625-2.5 mg per tablet Generic drug:  estrogen (conjugated)-medroxyPROGESTERone Take 1 Tab by mouth daily. topiramate 25 mg tablet Commonly known as:  TOPAMAX Take 1 Tab by mouth two (2) times a day. Prescriptions Printed Refills  
 phentermine 15 mg TbDi 0 Sig: Take 1 Tab by mouth daily. Max Daily Amount: 1 Tab. Class: Print Route: Oral  
  
Follow-up Instructions Return in about 4 weeks (around 12/20/2017). Patient Instructions Health Maintenance Due Topic Date Due  
 DTaP/Tdap/Td series (1 - Tdap) 08/22/2000  PAP AKA CERVICAL CYTOLOGY  08/22/2000  Influenza Age 5 to Adult  08/01/2017 Monthly goal: 
 
5 lbs weight loss, work on evening appetite suppression. Body Mass Index: Care Instructions Your Care Instructions Body mass index (BMI) can help you see if your weight is raising your risk for health problems. It uses a formula to compare how much you weigh with how tall you are. · A BMI lower than 18.5 is considered underweight. · A BMI between 18.5 and 24.9 is considered healthy. · A BMI between 25 and 29.9 is considered overweight. A BMI of 30 or higher is considered obese. If your BMI is in the normal range, it means that you have a lower risk for weight-related health problems. If your BMI is in the overweight or obese range, you may be at increased risk for weight-related health problems, such as high blood pressure, heart disease, stroke, arthritis or joint pain, and diabetes. If your BMI is in the underweight range, you may be at increased risk for health problems such as fatigue, lower protection (immunity) against illness, muscle loss, bone loss, hair loss, and hormone problems. BMI is just one measure of your risk for weight-related health problems. You may be at higher risk for health problems if you are not active, you eat an unhealthy diet, or you drink too much alcohol or use tobacco products. Follow-up care is a key part of your treatment and safety. Be sure to make and go to all appointments, and call your doctor if you are having problems. It's also a good idea to know your test results and keep a list of the medicines you take. How can you care for yourself at home? · Practice healthy eating habits. This includes eating plenty of fruits, vegetables, whole grains, lean protein, and low-fat dairy. · If your doctor recommends it, get more exercise. Walking is a good choice. Bit by bit, increase the amount you walk every day. Try for at least 30 minutes on most days of the week. · Do not smoke. Smoking can increase your risk for health problems. If you need help quitting, talk to your doctor about stop-smoking programs and medicines. These can increase your chances of quitting for good. · Limit alcohol to 2 drinks a day for men and 1 drink a day for women. Too much alcohol can cause health problems. If you have a BMI higher than 25 · Your doctor may do other tests to check your risk for weight-related health problems.  This may include measuring the distance around your waist. A waist measurement of more than 40 inches in men or 35 inches in women can increase the risk of weight-related health problems. · Talk with your doctor about steps you can take to stay healthy or improve your health. You may need to make lifestyle changes to lose weight and stay healthy, such as changing your diet and getting regular exercise. If you have a BMI lower than 18.5 · Your doctor may do other tests to check your risk for health problems. · Talk with your doctor about steps you can take to stay healthy or improve your health. You may need to make lifestyle changes to gain or maintain weight and stay healthy, such as getting more healthy foods in your diet and doing exercises to build muscle. Where can you learn more? Go to http://shirley-pawan.info/. Enter S176 in the search box to learn more about \"Body Mass Index: Care Instructions. \" Current as of: October 13, 2016 Content Version: 11.4 © 9293-1235 [employer]. Care instructions adapted under license by [employer] (which disclaims liability or warranty for this information). If you have questions about a medical condition or this instruction, always ask your healthcare professional. [place] [address] any warranty or liability for your use of this information. Introducing [facility] & HEALTH SERVICES! Dear Lawanda Rubinstein: Thank you for requesting a Adeyoh account. Our records indicate that you already have an active Adeyoh account. You can access your account anytime at [email or web address] Through Learning Did you know that you can access your hospital and ER discharge instructions at any time in Adeyoh? You can also review all of your test results from your hospital stay or ER visit. Additional Information If you have questions, please visit the Frequently Asked Questions section of the Adeyoh website at [email or web address] Through [email or web address]/. Remember, Adeyoh is NOT to be used for urgent needs. For medical emergencies, dial 911. Now available from your iPhone and Android! Please provide this summary of care documentation to your next provider. Your primary care clinician is listed as Prudence Canary. If you have any questions after today's visit, please call [phone].

## 2017-11-22 NOTE — PATIENT INSTRUCTIONS
Health Maintenance Due   Topic Date Due    DTaP/Tdap/Td series (1 - Tdap) 08/22/2000    PAP AKA CERVICAL CYTOLOGY  08/22/2000    Influenza Age 5 to Adult  08/01/2017     Monthly goal:    5 lbs weight loss, work on evening appetite suppression. Body Mass Index: Care Instructions  Your Care Instructions    Body mass index (BMI) can help you see if your weight is raising your risk for health problems. It uses a formula to compare how much you weigh with how tall you are. · A BMI lower than 18.5 is considered underweight. · A BMI between 18.5 and 24.9 is considered healthy. · A BMI between 25 and 29.9 is considered overweight. A BMI of 30 or higher is considered obese. If your BMI is in the normal range, it means that you have a lower risk for weight-related health problems. If your BMI is in the overweight or obese range, you may be at increased risk for weight-related health problems, such as high blood pressure, heart disease, stroke, arthritis or joint pain, and diabetes. If your BMI is in the underweight range, you may be at increased risk for health problems such as fatigue, lower protection (immunity) against illness, muscle loss, bone loss, hair loss, and hormone problems. BMI is just one measure of your risk for weight-related health problems. You may be at higher risk for health problems if you are not active, you eat an unhealthy diet, or you drink too much alcohol or use tobacco products. Follow-up care is a key part of your treatment and safety. Be sure to make and go to all appointments, and call your doctor if you are having problems. It's also a good idea to know your test results and keep a list of the medicines you take. How can you care for yourself at home? · Practice healthy eating habits. This includes eating plenty of fruits, vegetables, whole grains, lean protein, and low-fat dairy. · If your doctor recommends it, get more exercise. Walking is a good choice.  Bit by bit, increase the amount you walk every day. Try for at least 30 minutes on most days of the week. · Do not smoke. Smoking can increase your risk for health problems. If you need help quitting, talk to your doctor about stop-smoking programs and medicines. These can increase your chances of quitting for good. · Limit alcohol to 2 drinks a day for men and 1 drink a day for women. Too much alcohol can cause health problems. If you have a BMI higher than 25  · Your doctor may do other tests to check your risk for weight-related health problems. This may include measuring the distance around your waist. A waist measurement of more than 40 inches in men or 35 inches in women can increase the risk of weight-related health problems. · Talk with your doctor about steps you can take to stay healthy or improve your health. You may need to make lifestyle changes to lose weight and stay healthy, such as changing your diet and getting regular exercise. If you have a BMI lower than 18.5  · Your doctor may do other tests to check your risk for health problems. · Talk with your doctor about steps you can take to stay healthy or improve your health. You may need to make lifestyle changes to gain or maintain weight and stay healthy, such as getting more healthy foods in your diet and doing exercises to build muscle. Where can you learn more? Go to http://shirley-pawan.info/. Enter S176 in the search box to learn more about \"Body Mass Index: Care Instructions. \"  Current as of: October 13, 2016  Content Version: 11.4  © 1927-5523 Healthwise, [employer]. Care instructions adapted under license by [employer] (which disclaims liability or warranty for this information). If you have questions about a medical condition or this instruction, always ask your healthcare professional. [place] [address] any warranty or liability for your use of this information.

## 2017-11-29 ENCOUNTER — CLINICAL SUPPORT (OUTPATIENT)
Dept: FAMILY MEDICINE CLINIC | Age: 37
End: 2017-11-29

## 2017-11-29 VITALS
DIASTOLIC BLOOD PRESSURE: 81 MMHG | HEIGHT: 65 IN | SYSTOLIC BLOOD PRESSURE: 121 MMHG | HEART RATE: 87 BPM | BODY MASS INDEX: 36.96 KG/M2 | WEIGHT: 221.8 LBS

## 2017-11-29 DIAGNOSIS — E66.9 OBESITY (BMI 30-39.9): Primary | ICD-10-CM

## 2017-11-29 NOTE — PROGRESS NOTES
Progress Note: Weekly Education Class in the [facility] Loss Program   Is there anything that you or the patient needs to let the [facility] Physician know about? no  Over the past week, have you experienced any side-effects? no    Karely Mandel is a 44 y.o. female who is enrolled in [facility]    Visit Vitals    /81 (BP 1 Location: Right arm, BP Patient Position: Sitting)    Pulse 87    Ht 5' 5\" (1.651 m)    Wt 221 lb 12.8 oz (100.6 kg)    BMI 36.91 kg/m2     Weight Metrics 11/29/2017 11/22/2017 11/22/2017 11/9/2017 11/8/2017 11/1/2017 10/25/2017   Weight 221 lb 12.8 oz - 218 lb - 218 lb 9.6 oz 218 lb 9.6 oz -   Waist Measure Inches 36 38 - 37 - 38 38   Exercise Mins/week - 180 - - - - 200   Body Fat % - 40.3 - - - - 40.3   BMI 36.91 kg/m2 - 36.28 kg/m2 - 36.38 kg/m2 36.38 kg/m2 -         Have you received any other medical care this week? no  If yes, where and for what? Have you had any change in your medications since your last visit? no  If yes what? Did you have any problems adhering to the program last week? no  If yes, please explain:       Eating Habits Over Last Week:  Did you take in 64 oz of non-caloric fluids? yes     Did you consume your 4 meal replacements each day?  yes       Physical Activity Over the Past Week:    Aerobic exercise: 150 min  Resistance exercise: 150 min workouts / week

## 2017-12-21 ENCOUNTER — OFFICE VISIT (OUTPATIENT)
Dept: INTERNAL MEDICINE CLINIC | Age: 37
End: 2017-12-21

## 2017-12-21 ENCOUNTER — HOSPITAL ENCOUNTER (OUTPATIENT)
Dept: LAB | Age: 37
Discharge: HOME | End: 2017-12-21
Payer: OTHER GOVERNMENT

## 2017-12-21 VITALS
SYSTOLIC BLOOD PRESSURE: 110 MMHG | HEIGHT: 65 IN | HEART RATE: 69 BPM | TEMPERATURE: 97.3 F | BODY MASS INDEX: 36.87 KG/M2 | OXYGEN SATURATION: 100 % | DIASTOLIC BLOOD PRESSURE: 70 MMHG | RESPIRATION RATE: 20 BRPM | WEIGHT: 221.3 LBS

## 2017-12-21 DIAGNOSIS — E03.9 ACQUIRED HYPOTHYROIDISM: ICD-10-CM

## 2017-12-21 DIAGNOSIS — E66.9 OBESITY (BMI 30-39.9): ICD-10-CM

## 2017-12-21 DIAGNOSIS — K76.0 FATTY LIVER DISEASE, NONALCOHOLIC: ICD-10-CM

## 2017-12-21 DIAGNOSIS — E66.9 OBESITY (BMI 30-39.9): Primary | ICD-10-CM

## 2017-12-21 LAB
ALBUMIN SERPL-MCNC: 4.1 G/DL (ref 3.4–5)
ALBUMIN/GLOB SERPL: 1.2 {RATIO} (ref 0.8–1.7)
ALP SERPL-CCNC: 72 U/L (ref 45–117)
ALT SERPL-CCNC: 42 U/L (ref 13–56)
ANION GAP SERPL CALC-SCNC: 7 MMOL/L (ref 3–18)
AST SERPL-CCNC: 23 U/L (ref 15–37)
BILIRUB SERPL-MCNC: 0.4 MG/DL (ref 0.2–1)
BUN SERPL-MCNC: 15 MG/DL (ref 7–18)
BUN/CREAT SERPL: 19 (ref 12–20)
CALCIUM SERPL-MCNC: 8.6 MG/DL (ref 8.5–10.1)
CHLORIDE SERPL-SCNC: 110 MMOL/L (ref 100–108)
CO2 SERPL-SCNC: 25 MMOL/L (ref 21–32)
CREAT SERPL-MCNC: 0.77 MG/DL (ref 0.6–1.3)
GLOBULIN SER CALC-MCNC: 3.4 G/DL (ref 2–4)
GLUCOSE SERPL-MCNC: 80 MG/DL (ref 74–99)
POTASSIUM SERPL-SCNC: 4.2 MMOL/L (ref 3.5–5.5)
PROT SERPL-MCNC: 7.5 G/DL (ref 6.4–8.2)
SODIUM SERPL-SCNC: 142 MMOL/L (ref 136–145)
T4 FREE SERPL-MCNC: 1.3 NG/DL (ref 0.7–1.5)
TSH SERPL DL<=0.05 MIU/L-ACNC: 0.51 UIU/ML (ref 0.36–3.74)
URATE SERPL-MCNC: 3.4 MG/DL (ref 2.6–7.2)

## 2017-12-21 PROCEDURE — 36415 COLL VENOUS BLD VENIPUNCTURE: CPT | Performed by: NURSE PRACTITIONER

## 2017-12-21 PROCEDURE — 84439 ASSAY OF FREE THYROXINE: CPT | Performed by: NURSE PRACTITIONER

## 2017-12-21 PROCEDURE — 80053 COMPREHEN METABOLIC PANEL: CPT | Performed by: NURSE PRACTITIONER

## 2017-12-21 PROCEDURE — 84550 ASSAY OF BLOOD/URIC ACID: CPT | Performed by: NURSE PRACTITIONER

## 2017-12-21 PROCEDURE — 84443 ASSAY THYROID STIM HORMONE: CPT | Performed by: NURSE PRACTITIONER

## 2017-12-21 RX ORDER — PHENTERMINE HYDROCHLORIDE 15 MG/1
CAPSULE ORAL
Refills: 0 | COMMUNITY
Start: 2017-11-27 | End: 2017-12-21 | Stop reason: SDUPTHER

## 2017-12-21 RX ORDER — PHENTERMINE HYDROCHLORIDE 8 MG/1
TABLET ORAL
Refills: 0 | COMMUNITY
Start: 2017-10-27 | End: 2017-12-21 | Stop reason: ALTCHOICE

## 2017-12-21 NOTE — PATIENT INSTRUCTIONS
Monthly goal:    Some weight loss! Try having meal prior to working out, then taking phentermine about 2 hrs after first meal.         Body Mass Index: Care Instructions  Your Care Instructions    Body mass index (BMI) can help you see if your weight is raising your risk for health problems. It uses a formula to compare how much you weigh with how tall you are. · A BMI lower than 18.5 is considered underweight. · A BMI between 18.5 and 24.9 is considered healthy. · A BMI between 25 and 29.9 is considered overweight. A BMI of 30 or higher is considered obese. If your BMI is in the normal range, it means that you have a lower risk for weight-related health problems. If your BMI is in the overweight or obese range, you may be at increased risk for weight-related health problems, such as high blood pressure, heart disease, stroke, arthritis or joint pain, and diabetes. If your BMI is in the underweight range, you may be at increased risk for health problems such as fatigue, lower protection (immunity) against illness, muscle loss, bone loss, hair loss, and hormone problems. BMI is just one measure of your risk for weight-related health problems. You may be at higher risk for health problems if you are not active, you eat an unhealthy diet, or you drink too much alcohol or use tobacco products. Follow-up care is a key part of your treatment and safety. Be sure to make and go to all appointments, and call your doctor if you are having problems. It's also a good idea to know your test results and keep a list of the medicines you take. How can you care for yourself at home? · Practice healthy eating habits. This includes eating plenty of fruits, vegetables, whole grains, lean protein, and low-fat dairy. · If your doctor recommends it, get more exercise. Walking is a good choice. Bit by bit, increase the amount you walk every day. Try for at least 30 minutes on most days of the week. · Do not smoke.  Smoking can increase your risk for health problems. If you need help quitting, talk to your doctor about stop-smoking programs and medicines. These can increase your chances of quitting for good. · Limit alcohol to 2 drinks a day for men and 1 drink a day for women. Too much alcohol can cause health problems. If you have a BMI higher than 25  · Your doctor may do other tests to check your risk for weight-related health problems. This may include measuring the distance around your waist. A waist measurement of more than 40 inches in men or 35 inches in women can increase the risk of weight-related health problems. · Talk with your doctor about steps you can take to stay healthy or improve your health. You may need to make lifestyle changes to lose weight and stay healthy, such as changing your diet and getting regular exercise. If you have a BMI lower than 18.5  · Your doctor may do other tests to check your risk for health problems. · Talk with your doctor about steps you can take to stay healthy or improve your health. You may need to make lifestyle changes to gain or maintain weight and stay healthy, such as getting more healthy foods in your diet and doing exercises to build muscle. Where can you learn more? Go to http://shilrey-pawan.info/. Enter S176 in the search box to learn more about \"Body Mass Index: Care Instructions. \"  Current as of: October 13, 2016  Content Version: 11.4  © 0147-2831 Healthwise, Incorporated. Care instructions adapted under license by [employer] (which disclaims liability or warranty for this information). If you have questions about a medical condition or this instruction, always ask your healthcare professional. [place] [address] any warranty or liability for your use of this information.

## 2017-12-21 NOTE — PROGRESS NOTES
Chief Complaint   Patient presents with    Weight Management     1. Have you been to the ER, urgent care clinic since your last visit? Hospitalized since your last visit? No    2. Have you seen or consulted any other health care providers outside of the [address] [place] [address] since your last visit? Include any pap smears or colon screening.  No

## 2017-12-21 NOTE — PROGRESS NOTES
[facility] Progress Note:   F/up Physician Visit    CC: Obesity      Albino Ackerman is a 44 y.o. female who is here for her  f/up physician visit for the [facility]CD Program. Tala Arreola has completed 49 weeks of the program to date. 3 lb weight gain, has been working out, feeling well on phentermine/topamax combo, no cravings. Usually fasting until bout 10ish in am, not eating before working out in am.  Increased stress with  home x 1 month.     Starting weight: 4/15/15 at 298 lbs  Current weight: 221 lbs  Goal weight: 170 lbs      Most recent EKG: 10/14/15 at 194lbs    Weight Metrics 12/21/2017 11/29/2017 11/22/2017 11/22/2017 11/9/2017 11/8/2017 11/1/2017   Weight 221 lb 4.8 oz 221 lb 12.8 oz - 218 lb - 218 lb 9.6 oz 218 lb 9.6 oz   Waist Measure Inches 36 36 38 - 37 - 38   Exercise Mins/week 150 - 180 - - - -   Body Fat % 40.8 - 40.3 - - - -   BMI 36.83 kg/m2 36.91 kg/m2 - 36.28 kg/m2 - 36.38 kg/m2 36.38 kg/m2         Current Outpatient Prescriptions   Medication Sig Dispense Refill    phentermine 15 mg TbDi Take 1 Tab by mouth daily. Max Daily Amount: 1 Tab. 30 Tab 0    topiramate (TOPAMAX) 25 mg tablet Take 1 Tab by mouth two (2) times a day. 60 Tab 2    cholecalciferol, VITAMIN D3, (VITAMIN D3) 5,000 unit tab tablet Take 5,000 Units by mouth daily.  POLYETHYLENE GLYCOL 3350 (MIRALAX PO) Take  by mouth every other day.  levothyroxine (SYNTHROID) 137 mcg tablet Take 137 mcg by mouth Daily (before breakfast).  Biotin-Silicon Diox-L-Cysteine 5,000 mcg-100 mg- 50 mg tab Take 5,000 mcg by mouth two (2) times a day.  fluticasone-salmeterol (ADVAIR DISKUS) 250-50 mcg/dose diskus inhaler Take 1 Puff by inhalation every twelve (12) hours as needed.  albuterol (PROVENTIL VENTOLIN) 2.5 mg /3 mL (0.083 %) nebulizer solution by Nebulization route once.  Indications: ACUTE ASTHMA ATTACK      estrogen, conjugated,-medroxyPROGESTERone (PREMPRO) 0.625-2.5 mg per tablet Take 1 Tab by mouth daily. Participation   Did you attend clinic and class last week? no    Review of Systems  Since your last visit, have you experienced any complications? no  If yes, please list:     No CP, SOB, palpitations, lightheadedness, dizziness, constipation. Positives highlight in BOLD. Are you taking an appetite suppressant? yes  If so, is there any Chest Pain, Palpitations or Dizziness? no  BP Readings from Last 10 Encounters:   12/21/17 110/70   11/29/17 121/81   11/22/17 115/75   11/09/17 116/78   11/01/17 115/76   10/25/17 109/72   10/18/17 119/79   09/27/17 114/73   09/20/17 109/67   09/13/17 107/74         Have you received any other medical care this week? no  If yes, where and for what? Have you discontinued or changed any medicine or dose of your medicine since your last visit? no  If yes, where and for what? Diet  How many ounces of calorie-free liquids did you consume each day?  75 oz    How many meal replacements did you take each day? 3 plus 1 outside meal 400 calorie intake    Did you have any problems adhering to the program?  no If yes, please explain:       Exercise  Aerobic exercise: 150 min  Resistance exercise: 150 min workouts / week  Any discomfort?  no     If yes, where? Review of Systems  Complete ROS negative except where noted above    Objective  Visit Vitals    /70 (BP 1 Location: Left arm, BP Patient Position: Sitting)    Pulse 69    Temp 97.3 °F (36.3 °C) (Oral)    Resp 20    Ht 5' 5\" (1.651 m)    Wt 221 lb 4.8 oz (100.4 kg)    SpO2 100%    BMI 36.83 kg/m2     No LMP recorded. Patient is not currently having periods (Reason: Polycystic Ovarian Syndrome).     Waist Circumference: I personally reviewed patient's Weight Management Doc Flowsheet  Neck Circumference: I personally reviewed patient's Weight Management Doc Flowsheet  Percent Body Fat: I personally reviewed patient's Weight Management Doc Flowsheet    Physical Exam  Appearance: well appearing, obese, A&O, NAD  HEENT:  NC/AT, PERRL, No scleral icterus  Heart:  RRR without M/R/G  Lungs:  CTAB, no rhonchi, rales, or wheezes with good air exchange   Ext:  No LE Edema    Assessment / Plan    Encounter Diagnoses   Name Primary?  Obesity (BMI 30-39. 9) Yes    Acquired hypothyroidism     Fatty liver disease, nonalcoholic        1. Weight management borderline controlled   Progress was reviewed with patient    2. Labs    Latest results reviewed with patient   Lab slip given to pt for f/up HDL labs    3. Diet regimen   # of meal replacements prescribed: 3 + 500 calorie meal   If modified LCD-nutritional guidelines:    Monthly Goal   As below    Medical monitoring schedule:   Weekly BP/Weight checks   Monthly provider appointments          Patient Instructions   Monthly goal:    Some weight loss! Try having meal prior to working out, then taking phentermine about 2 hrs after first meal.         Body Mass Index: Care Instructions  Your Care Instructions    Body mass index (BMI) can help you see if your weight is raising your risk for health problems. It uses a formula to compare how much you weigh with how tall you are. · A BMI lower than 18.5 is considered underweight. · A BMI between 18.5 and 24.9 is considered healthy. · A BMI between 25 and 29.9 is considered overweight. A BMI of 30 or higher is considered obese. If your BMI is in the normal range, it means that you have a lower risk for weight-related health problems. If your BMI is in the overweight or obese range, you may be at increased risk for weight-related health problems, such as high blood pressure, heart disease, stroke, arthritis or joint pain, and diabetes. If your BMI is in the underweight range, you may be at increased risk for health problems such as fatigue, lower protection (immunity) against illness, muscle loss, bone loss, hair loss, and hormone problems.   BMI is just one measure of your risk for weight-related health problems. You may be at higher risk for health problems if you are not active, you eat an unhealthy diet, or you drink too much alcohol or use tobacco products. Follow-up care is a key part of your treatment and safety. Be sure to make and go to all appointments, and call your doctor if you are having problems. It's also a good idea to know your test results and keep a list of the medicines you take. How can you care for yourself at home? · Practice healthy eating habits. This includes eating plenty of fruits, vegetables, whole grains, lean protein, and low-fat dairy. · If your doctor recommends it, get more exercise. Walking is a good choice. Bit by bit, increase the amount you walk every day. Try for at least 30 minutes on most days of the week. · Do not smoke. Smoking can increase your risk for health problems. If you need help quitting, talk to your doctor about stop-smoking programs and medicines. These can increase your chances of quitting for good. · Limit alcohol to 2 drinks a day for men and 1 drink a day for women. Too much alcohol can cause health problems. If you have a BMI higher than 25  · Your doctor may do other tests to check your risk for weight-related health problems. This may include measuring the distance around your waist. A waist measurement of more than 40 inches in men or 35 inches in women can increase the risk of weight-related health problems. · Talk with your doctor about steps you can take to stay healthy or improve your health. You may need to make lifestyle changes to lose weight and stay healthy, such as changing your diet and getting regular exercise. If you have a BMI lower than 18.5  · Your doctor may do other tests to check your risk for health problems. · Talk with your doctor about steps you can take to stay healthy or improve your health.  You may need to make lifestyle changes to gain or maintain weight and stay healthy, such as getting more healthy foods in your diet and doing exercises to build muscle. Where can you learn more? Go to http://shirley-pawan.info/. Enter S176 in the search box to learn more about \"Body Mass Index: Care Instructions. \"  Current as of: October 13, 2016  Content Version: 11.4  © 7249-2032 [employer]. Care instructions adapted under license by [employer] (which disclaims liability or warranty for this information). If you have questions about a medical condition or this instruction, always ask your healthcare professional. [place] [address] any warranty or liability for your use of this information. Follow-up Disposition:  Return in about 4 weeks (around 1/18/2018). 15 minutes of the 25 minutes face to face time with Ricardo Hampton consisted of counseling & coordinating and/or discussing treatment plans in reference to her The primary encounter diagnosis was Obesity (BMI 30-39.9). Diagnoses of Acquired hypothyroidism and Fatty liver disease, nonalcoholic were also pertinent to this visit. The patient is to follow up as scheduled and will report to the ED or the office if symptoms change or increase. The patient has voiced understanding and will comply.

## 2017-12-21 NOTE — MR AVS SNAPSHOT
Visit Information Date & Time Provider Department Dept. Phone Encounter #  
 12/21/2017  9:30 AM Yessica Kumar NP [facility] [address] [place] [address] [phone] Follow-up Instructions Return in about 4 weeks (around 1/18/2018). Your Appointments 1/18/2018  9:91 AM  
METABOLIC PROGRAM 15 with Yessica Kumar NP [facility] [address] [place] [address] ([address]) Appt Note: mwl  
 [address], [address] [facility] [address] [address] [place]  
  
   
 [address], [address] Upcoming Health Maintenance Date Due DTaP/Tdap/Td series (1 - Tdap) 8/22/2000 PAP AKA CERVICAL CYTOLOGY 8/22/2000 Influenza Age 5 to Adult 8/1/2017 Allergies as of 12/21/2017  Review Complete On: 12/21/2017 By: Yessica Kumar NP Severity Noted Reaction Type Reactions Sulfa (Sulfonamide Antibiotics)  04/15/2015    Rash Current Immunizations  Never Reviewed No immunizations on file. Not reviewed this visit You Were Diagnosed With   
  
 Codes Comments Obesity (BMI 30-39.9)    -  Primary ICD-10-CM: Q57.4 ICD-9-CM: 278.00 Acquired hypothyroidism     ICD-10-CM: E03.9 ICD-9-CM: 244.9 Fatty liver disease, nonalcoholic     [id number]: X23.4 ICD-9-CM: 571.8 Vitals BP Pulse Temp Resp Height(growth percentile) Weight(growth percentile) 110/70 (BP 1 Location: Left arm, BP Patient Position: Sitting) 69 97.3 °F (36.3 °C) (Oral) 20 5' 5\" (1.651 m) 221 lb 4.8 oz (100.4 kg) SpO2 BMI OB Status Smoking Status 100% 36.83 kg/m2 Polycystic Ovarian Syndrome Never Smoker BMI and BSA Data Body Mass Index Body Surface Area  
 36.83 kg/m 2 2.15 m 2 Preferred Pharmacy Pharmacy Name Phone [id number] [address] [facility], [address] [place] [address] Rd [phone] Your Updated Medication List  
  
   
 This list is accurate as of: 12/21/17 10:12 AM.  Always use your most recent med list.  
  
  
  
  
 ADVAIR DISKUS 250-50 mcg/dose diskus inhaler Generic drug:  fluticasone-salmeterol Take 1 Puff by inhalation every twelve (12) hours as needed. albuterol 2.5 mg /3 mL (0.083 %) nebulizer solution Commonly known as:  PROVENTIL VENTOLIN  
by Nebulization route once. Indications: ACUTE ASTHMA ATTACK Biotin-Silicon Diox-L-Cysteine 5,000 mcg -100 mg-50 mg Tab Take 5,000 mcg by mouth two (2) times a day. cholecalciferol (VITAMIN D3) 5,000 unit Tab tablet Commonly known as:  VITAMIN D3 Take 5,000 Units by mouth daily. levothyroxine 137 mcg tablet Commonly known as:  SYNTHROID Take 137 mcg by mouth Daily (before breakfast). MIRALAX PO Take  by mouth every other day. phentermine 15 mg Tbdi Take 1 Tab by mouth daily. Max Daily Amount: 1 Tab. PREMPRO 0.625-2.5 mg per tablet Generic drug:  estrogen (conjugated)-medroxyPROGESTERone Take 1 Tab by mouth daily. topiramate 25 mg tablet Commonly known as:  TOPAMAX Take 1 Tab by mouth two (2) times a day. Prescriptions Printed Refills  
 phentermine 15 mg TbDi 0 Sig: Take 1 Tab by mouth daily. Max Daily Amount: 1 Tab. Class: Print Route: Oral  
  
Follow-up Instructions Return in about 4 weeks (around 1/18/2018). Patient Instructions Monthly goal: 
 
Some weight loss! Try having meal prior to working out, then taking phentermine about 2 hrs after first meal. 
 
 
  
Body Mass Index: Care Instructions Your Care Instructions Body mass index (BMI) can help you see if your weight is raising your risk for health problems. It uses a formula to compare how much you weigh with how tall you are. · A BMI lower than 18.5 is considered underweight. · A BMI between 18.5 and 24.9 is considered healthy. · A BMI between 25 and 29.9 is considered overweight.  A BMI of 30 or higher is considered obese. If your BMI is in the normal range, it means that you have a lower risk for weight-related health problems. If your BMI is in the overweight or obese range, you may be at increased risk for weight-related health problems, such as high blood pressure, heart disease, stroke, arthritis or joint pain, and diabetes. If your BMI is in the underweight range, you may be at increased risk for health problems such as fatigue, lower protection (immunity) against illness, muscle loss, bone loss, hair loss, and hormone problems. BMI is just one measure of your risk for weight-related health problems. You may be at higher risk for health problems if you are not active, you eat an unhealthy diet, or you drink too much alcohol or use tobacco products. Follow-up care is a key part of your treatment and safety. Be sure to make and go to all appointments, and call your doctor if you are having problems. It's also a good idea to know your test results and keep a list of the medicines you take. How can you care for yourself at home? · Practice healthy eating habits. This includes eating plenty of fruits, vegetables, whole grains, lean protein, and low-fat dairy. · If your doctor recommends it, get more exercise. Walking is a good choice. Bit by bit, increase the amount you walk every day. Try for at least 30 minutes on most days of the week. · Do not smoke. Smoking can increase your risk for health problems. If you need help quitting, talk to your doctor about stop-smoking programs and medicines. These can increase your chances of quitting for good. · Limit alcohol to 2 drinks a day for men and 1 drink a day for women. Too much alcohol can cause health problems. If you have a BMI higher than 25 · Your doctor may do other tests to check your risk for weight-related health problems.  This may include measuring the distance around your waist. A waist measurement of more than 40 inches in men or 35 inches in women can increase the risk of weight-related health problems. · Talk with your doctor about steps you can take to stay healthy or improve your health. You may need to make lifestyle changes to lose weight and stay healthy, such as changing your diet and getting regular exercise. If you have a BMI lower than 18.5 · Your doctor may do other tests to check your risk for health problems. · Talk with your doctor about steps you can take to stay healthy or improve your health. You may need to make lifestyle changes to gain or maintain weight and stay healthy, such as getting more healthy foods in your diet and doing exercises to build muscle. Where can you learn more? Go to http://shriley-pawan.info/. Enter S176 in the search box to learn more about \"Body Mass Index: Care Instructions. \" Current as of: October 13, 2016 Content Version: 11.4 © 2922-2416 [email or web address]. Care instructions adapted under license by [employer] (which disclaims liability or warranty for this information). If you have questions about a medical condition or this instruction, always ask your healthcare professional. [place] [address] any warranty or liability for your use of this information. Introducing [facility]! Dear Denis Razo: Thank you for requesting a MobiPixie account. Our records indicate that you already have an active MobiPixie account. You can access your account anytime at [email or web address] Did you know that you can access your hospital and ER discharge instructions at any time in MobiPixie? You can also review all of your test results from your hospital stay or ER visit. Additional Information If you have questions, please visit the Frequently Asked Questions section of the MobiPixie website at [email or web address] Remember, [employer] is NOT to be used for urgent needs. For medical emergencies, dial 911. Now available from your iPhone and Android! Please provide this summary of care documentation to your next provider. Your primary care clinician is listed as Alicia Mane. If you have any questions after today's visit, please call [phone].

## 2018-01-10 ENCOUNTER — CLINICAL SUPPORT (OUTPATIENT)
Dept: FAMILY MEDICINE CLINIC | Age: 38
End: 2018-01-10

## 2018-01-10 DIAGNOSIS — E66.9 OBESITY (BMI 30-39.9): Primary | ICD-10-CM

## 2018-01-11 VITALS
WEIGHT: 225.4 LBS | BODY MASS INDEX: 37.55 KG/M2 | DIASTOLIC BLOOD PRESSURE: 80 MMHG | SYSTOLIC BLOOD PRESSURE: 126 MMHG | HEIGHT: 65 IN | HEART RATE: 69 BPM

## 2018-01-11 NOTE — PROGRESS NOTES
Progress Note: Weekly Education Class in the [facility] Loss Program   Is there anything that you or the patient needs to let the [facility] Physician know about? no  Over the past week, have you experienced any side-effects? no    Houston Price is a 44 y.o. female who is enrolled in [facility]    Visit Vitals    /80 (BP 1 Location: Right arm, BP Patient Position: Sitting)    Pulse 69    Ht 5' 5\" (1.651 m)    Wt 225 lb 6.4 oz (102.2 kg)    BMI 37.51 kg/m2     Weight Metrics 1/11/2018 1/10/2018 12/21/2017 12/21/2017 11/29/2017 11/22/2017 11/22/2017   Weight - 225 lb 6.4 oz - 221 lb 4.8 oz 221 lb 12.8 oz - 218 lb   Waist Measure Inches 37 - 36 - 36 38 -   Exercise Mins/week - - 150 - - 180 -   Body Fat % - - 40.8 - - 40.3 -   BMI - 37.51 kg/m2 - 36.83 kg/m2 36.91 kg/m2 - 36.28 kg/m2         Have you received any other medical care this week? yes  If yes, where and for what? PCP for Kidney Infection, Flu, Bronchitis for the past 3 weeks    Have you had any change in your medications since your last visit? no  If yes what? Did you have any problems adhering to the program last week? yes  If yes, please explain: due to illness       Eating Habits Over Last Week:  Did you take in 64 oz of non-caloric fluids?  yes     Did you consume your 4 meal replacements each day? no       Physical Activity Over the Past Week:    Aerobic exercise: 0 min  Resistance exercise: 0 workouts / week

## 2018-01-18 ENCOUNTER — OFFICE VISIT (OUTPATIENT)
Dept: INTERNAL MEDICINE CLINIC | Age: 38
End: 2018-01-18

## 2018-01-18 VITALS
OXYGEN SATURATION: 99 % | WEIGHT: 220 LBS | RESPIRATION RATE: 16 BRPM | SYSTOLIC BLOOD PRESSURE: 106 MMHG | DIASTOLIC BLOOD PRESSURE: 70 MMHG | TEMPERATURE: 97.4 F | HEIGHT: 65 IN | BODY MASS INDEX: 36.65 KG/M2 | HEART RATE: 75 BPM

## 2018-01-18 DIAGNOSIS — K76.0 FATTY LIVER DISEASE, NONALCOHOLIC: ICD-10-CM

## 2018-01-18 DIAGNOSIS — E66.9 OBESITY (BMI 30-39.9): Primary | ICD-10-CM

## 2018-01-18 DIAGNOSIS — E03.9 ACQUIRED HYPOTHYROIDISM: ICD-10-CM

## 2018-01-18 RX ORDER — TOPIRAMATE 25 MG/1
25 TABLET ORAL 2 TIMES DAILY
Qty: 60 TAB | Refills: 2 | Status: SHIPPED | OUTPATIENT
Start: 2018-01-18

## 2018-01-18 NOTE — PATIENT INSTRUCTIONS
Monthly goal:    5-10 lbs weight loss         Body Mass Index: Care Instructions  Your Care Instructions    Body mass index (BMI) can help you see if your weight is raising your risk for health problems. It uses a formula to compare how much you weigh with how tall you are. · A BMI lower than 18.5 is considered underweight. · A BMI between 18.5 and 24.9 is considered healthy. · A BMI between 25 and 29.9 is considered overweight. A BMI of 30 or higher is considered obese. If your BMI is in the normal range, it means that you have a lower risk for weight-related health problems. If your BMI is in the overweight or obese range, you may be at increased risk for weight-related health problems, such as high blood pressure, heart disease, stroke, arthritis or joint pain, and diabetes. If your BMI is in the underweight range, you may be at increased risk for health problems such as fatigue, lower protection (immunity) against illness, muscle loss, bone loss, hair loss, and hormone problems. BMI is just one measure of your risk for weight-related health problems. You may be at higher risk for health problems if you are not active, you eat an unhealthy diet, or you drink too much alcohol or use tobacco products. Follow-up care is a key part of your treatment and safety. Be sure to make and go to all appointments, and call your doctor if you are having problems. It's also a good idea to know your test results and keep a list of the medicines you take. How can you care for yourself at home? · Practice healthy eating habits. This includes eating plenty of fruits, vegetables, whole grains, lean protein, and low-fat dairy. · If your doctor recommends it, get more exercise. Walking is a good choice. Bit by bit, increase the amount you walk every day. Try for at least 30 minutes on most days of the week. · Do not smoke. Smoking can increase your risk for health problems.  If you need help quitting, talk to your doctor about stop-smoking programs and medicines. These can increase your chances of quitting for good. · Limit alcohol to 2 drinks a day for men and 1 drink a day for women. Too much alcohol can cause health problems. If you have a BMI higher than 25  · Your doctor may do other tests to check your risk for weight-related health problems. This may include measuring the distance around your waist. A waist measurement of more than 40 inches in men or 35 inches in women can increase the risk of weight-related health problems. · Talk with your doctor about steps you can take to stay healthy or improve your health. You may need to make lifestyle changes to lose weight and stay healthy, such as changing your diet and getting regular exercise. If you have a BMI lower than 18.5  · Your doctor may do other tests to check your risk for health problems. · Talk with your doctor about steps you can take to stay healthy or improve your health. You may need to make lifestyle changes to gain or maintain weight and stay healthy, such as getting more healthy foods in your diet and doing exercises to build muscle. Where can you learn more? Go to http://shirley-pawan.info/. Enter S176 in the search box to learn more about \"Body Mass Index: Care Instructions. \"  Current as of: October 13, 2016  Content Version: 11.4  © 4041-0166 Healthwise, Incorporated. Care instructions adapted under license by [employer] (which disclaims liability or warranty for this information). If you have questions about a medical condition or this instruction, always ask your healthcare professional. [place] [address] any warranty or liability for your use of this information.

## 2018-01-18 NOTE — PROGRESS NOTES
[facility] Progress Note:   F/up Physician Visit    CC: obesity    Hemalatha Vasquez is a 44 y.o. female who is here for her  f/up physician visit for the VLCD Program. Simran Evangelista has completed 53 weeks of the program to date. 1 lbs weight loss, last week went back to full blown VLCD. Feels well, not having any cravings or issues with meds.      Starting weight: 4/15/15 at 298 lbs  Current weight: 220 lbs  Goal weight: 170 lbs      Most recent EKG: 10/14/15 at 194lbs    Weight Metrics 1/18/2018 1/18/2018 1/11/2018 1/10/2018 12/21/2017 12/21/2017 11/29/2017   Weight - 220 lb - 225 lb 6.4 oz - 221 lb 4.8 oz 221 lb 12.8 oz   Waist Measure Inches 39 - 37 - 36 - 36   Exercise Mins/week - - - - 150 - -   Body Fat % 40.6 - - - 40.8 - -   BMI - 36.61 kg/m2 - 37.51 kg/m2 - 36.83 kg/m2 36.91 kg/m2         Current Outpatient Prescriptions   Medication Sig Dispense Refill    phentermine 15 mg TbDi Take 1 Tab by mouth daily. Max Daily Amount: 1 Tab. 30 Tab 0    topiramate (TOPAMAX) 25 mg tablet Take 1 Tab by mouth two (2) times a day. 60 Tab 2    cholecalciferol, VITAMIN D3, (VITAMIN D3) 5,000 unit tab tablet Take 5,000 Units by mouth daily.  POLYETHYLENE GLYCOL 3350 (MIRALAX PO) Take  by mouth every other day.  levothyroxine (SYNTHROID) 137 mcg tablet Take 137 mcg by mouth Daily (before breakfast).  fluticasone-salmeterol (ADVAIR DISKUS) 250-50 mcg/dose diskus inhaler Take 1 Puff by inhalation every twelve (12) hours as needed.  albuterol (PROVENTIL VENTOLIN) 2.5 mg /3 mL (0.083 %) nebulizer solution by Nebulization route once. Indications: ACUTE ASTHMA ATTACK      estrogen, conjugated,-medroxyPROGESTERone (PREMPRO) 0.625-2.5 mg per tablet Take 1 Tab by mouth daily.  Biotin-Silicon Diox-L-Cysteine 5,000 mcg-100 mg- 50 mg tab Take 5,000 mcg by mouth two (2) times a day. Participation   Did you attend clinic and class last week?  yes    Review of Systems  Since your last visit, have you experienced any complications? no  If yes, please list: n/a    No CP, SOB, palpitations, lightheadedness, dizziness, constipation. Positives highlight in BOLD. Are you taking an appetite suppressant? yes  If so, is there any Chest Pain, Palpitations or Dizziness? BP Readings from Last 10 Encounters:   01/18/18 106/70   01/11/18 126/80   12/21/17 110/70   11/29/17 121/81   11/22/17 115/75   11/09/17 116/78   11/01/17 115/76   10/25/17 109/72   10/18/17 119/79   09/27/17 114/73         Have you received any other medical care this week? no  If yes, where and for what? Have you discontinued or changed any medicine or dose of your medicine since your last visit? no  If yes, where and for what? Diet  How many ounces of calorie-free liquids did you consume each day? 64 oz    How many meal replacements did you take each day? 4     Did you have any problems adhering to the program?  no If yes, please explain: n/a      Exercise  Aerobic exercise: 120 min  Resistance exercise: 0 workouts / week  Any discomfort?  no     If yes, where? Review of Systems  Complete ROS negative except where noted above    Objective  Visit Vitals    /70    Pulse 75    Temp 97.4 °F (36.3 °C)    Resp 16    Ht 5' 5\" (1.651 m)    Wt 220 lb (99.8 kg)    SpO2 99%    BMI 36.61 kg/m2     No LMP recorded. Patient is not currently having periods (Reason: Polycystic Ovarian Syndrome).     Waist Circumference: I personally reviewed patient's Weight Management Doc Flowsheet  Neck Circumference: I personally reviewed patient's Weight Management Doc Flowsheet  Percent Body Fat: I personally reviewed patient's Weight Management Doc Flowsheet    Physical Exam  Appearance: well appearing, A&O, NAD  HEENT:  NC/AT, PERRL, No scleral icterus  Heart:  RRR without M/R/G  Lungs:  CTAB, no rhonchi, rales, or wheezes with good air exchange   Ext:  No LE Edema    Assessment / Plan    Encounter Diagnoses   Name Primary?  Obesity (BMI 30-39. 9) Yes    Acquired hypothyroidism     Fatty liver disease, nonalcoholic        1. Weight management improved, reasonably well controlled   Progress was reviewed with patient    2. Labs    Latest results reviewed with patient   Lab slip given to pt for f/up HDL labs    3. Diet regimen   # of meal replacements prescribed: 4   If modified LCD-nutritional guidelines:    Monthly Goal   5-10 lbs weight loss    Medical monitoring schedule:   Weekly BP/Weight checks   Monthly provider appointments          Patient Instructions   Monthly goal:    5-10 lbs weight loss         Body Mass Index: Care Instructions  Your Care Instructions    Body mass index (BMI) can help you see if your weight is raising your risk for health problems. It uses a formula to compare how much you weigh with how tall you are. · A BMI lower than 18.5 is considered underweight. · A BMI between 18.5 and 24.9 is considered healthy. · A BMI between 25 and 29.9 is considered overweight. A BMI of 30 or higher is considered obese. If your BMI is in the normal range, it means that you have a lower risk for weight-related health problems. If your BMI is in the overweight or obese range, you may be at increased risk for weight-related health problems, such as high blood pressure, heart disease, stroke, arthritis or joint pain, and diabetes. If your BMI is in the underweight range, you may be at increased risk for health problems such as fatigue, lower protection (immunity) against illness, muscle loss, bone loss, hair loss, and hormone problems. BMI is just one measure of your risk for weight-related health problems. You may be at higher risk for health problems if you are not active, you eat an unhealthy diet, or you drink too much alcohol or use tobacco products. Follow-up care is a key part of your treatment and safety. Be sure to make and go to all appointments, and call your doctor if you are having problems.  It's also a good idea to know your test results and keep a list of the medicines you take. How can you care for yourself at home? · Practice healthy eating habits. This includes eating plenty of fruits, vegetables, whole grains, lean protein, and low-fat dairy. · If your doctor recommends it, get more exercise. Walking is a good choice. Bit by bit, increase the amount you walk every day. Try for at least 30 minutes on most days of the week. · Do not smoke. Smoking can increase your risk for health problems. If you need help quitting, talk to your doctor about stop-smoking programs and medicines. These can increase your chances of quitting for good. · Limit alcohol to 2 drinks a day for men and 1 drink a day for women. Too much alcohol can cause health problems. If you have a BMI higher than 25  · Your doctor may do other tests to check your risk for weight-related health problems. This may include measuring the distance around your waist. A waist measurement of more than 40 inches in men or 35 inches in women can increase the risk of weight-related health problems. · Talk with your doctor about steps you can take to stay healthy or improve your health. You may need to make lifestyle changes to lose weight and stay healthy, such as changing your diet and getting regular exercise. If you have a BMI lower than 18.5  · Your doctor may do other tests to check your risk for health problems. · Talk with your doctor about steps you can take to stay healthy or improve your health. You may need to make lifestyle changes to gain or maintain weight and stay healthy, such as getting more healthy foods in your diet and doing exercises to build muscle. Where can you learn more? Go to http://shirley-pawan.info/. Enter S176 in the search box to learn more about \"Body Mass Index: Care Instructions. \"  Current as of: October 13, 2016  Content Version: 11.4  © 5476-5430 Healthwise, [facility].  Care instructions adapted under license by [employer] (which disclaims liability or warranty for this information). If you have questions about a medical condition or this instruction, always ask your healthcare professional. [address] any warranty or liability for your use of this information. Follow-up Disposition:  Return in about 4 weeks (around 2/15/2018). 10 minutes of the 15 minutes face to face time with Alistair Roberts consisted of counseling & coordinating and/or discussing treatment plans in reference to her The primary encounter diagnosis was Obesity (BMI 30-39.9). Diagnoses of Acquired hypothyroidism and Fatty liver disease, nonalcoholic were also pertinent to this visit. The patient is to follow up as scheduled and will report to the ED or the office if symptoms change or increase. The patient has voiced understanding and will comply.

## 2018-01-18 NOTE — MR AVS SNAPSHOT
[address] 
[phone] Patient: Joel Neely 
MRN: [id number] :1980 Visit Information Date & Time Provider Department Dept. Phone Encounter #  
 2018  9:30 AM Triston Harris NP [facility] [phone] Follow-up Instructions Return in about 4 weeks (around 2/15/2018). Upcoming Health Maintenance Date Due DTaP/Tdap/Td series (1 - Tdap) 2000 PAP AKA CERVICAL CYTOLOGY 2000 Influenza Age 5 to Adult 2017 Allergies as of 2018  Review Complete On: 2018 By: Triston Harris NP Severity Noted Reaction Type Reactions Sulfa (Sulfonamide Antibiotics)  04/15/2015    Rash Current Immunizations  Never Reviewed No immunizations on file. Not reviewed this visit You Were Diagnosed With   
  
 Codes Comments Obesity (BMI 30-39.9)    -  Primary ICD-10-CM: M52.0 ICD-9-CM: 278.00 Acquired hypothyroidism     ICD-10-CM: E03.9 ICD-9-CM: 244.9 Fatty liver disease, nonalcoholic     DGA-52-QM: V65.2 ICD-9-CM: 571.8 Vitals BP Pulse Temp Resp Height(growth percentile) Weight(growth percentile) 106/70 75 97.4 °F (36.3 °C) 16 5' 5\" (1.651 m) 220 lb (99.8 kg) SpO2 BMI OB Status Smoking Status 99% 36.61 kg/m2 Polycystic Ovarian Syndrome Never Smoker Vitals History BMI and BSA Data Body Mass Index Body Surface Area  
 36.61 kg/m 2 2.14 m 2 Preferred Pharmacy Pharmacy Name Phone [facility] [address] [phone] Your Updated Medication List  
  
   
This list is accurate as of: 18 10:29 AM.  Always use your most recent med list.  
  
  
  
  
 ADVAIR DISKUS 250-50 mcg/dose diskus inhaler Generic drug:  fluticasone-salmeterol Take 1 Puff by inhalation every twelve (12) hours as needed. albuterol 2.5 mg /3 mL (0.083 %) nebulizer solution Commonly known as:  PROVENTIL VENTOLIN  
by Nebulization route once. Indications: ACUTE ASTHMA ATTACK Biotin-Silicon Diox-L-Cysteine 5,000 mcg -100 mg-50 mg Tab Take 5,000 mcg by mouth two (2) times a day. cholecalciferol (VITAMIN D3) 5,000 unit Tab tablet Commonly known as:  VITAMIN D3 Take 5,000 Units by mouth daily. levothyroxine 137 mcg tablet Commonly known as:  SYNTHROID Take 137 mcg by mouth Daily (before breakfast). MIRALAX PO Take  by mouth every other day. phentermine 15 mg Tbdi Take 1 Tab by mouth daily. Max Daily Amount: 1 Tab. PREMPRO 0.625-2.5 mg per tablet Generic drug:  estrogen (conjugated)-medroxyPROGESTERone Take 1 Tab by mouth daily. topiramate 25 mg tablet Commonly known as:  TOPAMAX Take 1 Tab by mouth two (2) times a day. Prescriptions Printed Refills  
 phentermine 15 mg TbDi 0 Sig: Take 1 Tab by mouth daily. Max Daily Amount: 1 Tab. Class: Print Route: Oral  
  
Prescriptions Sent to Pharmacy Refills  
 topiramate (TOPAMAX) 25 mg tablet 2 Sig: Take 1 Tab by mouth two (2) times a day. Class: Normal  
 Pharmacy: [address] [place] [address] #: [phone] Route: Oral  
  
Follow-up Instructions Return in about 4 weeks (around 2/15/2018). To-Do List   
 01/18/2018 Lab:  METABOLIC PANEL, COMPREHENSIVE   
  
 01/18/2018 Lab:  URIC ACID Patient Instructions Monthly goal: 
 
5-10 lbs weight loss Body Mass Index: Care Instructions Your Care Instructions Body mass index (BMI) can help you see if your weight is raising your risk for health problems. It uses a formula to compare how much you weigh with how tall you are. · A BMI lower than 18.5 is considered underweight. · A BMI between 18.5 and 24.9 is considered healthy. · A BMI between 25 and 29.9 is considered overweight. A BMI of 30 or higher is considered obese. If your BMI is in the normal range, it means that you have a lower risk for weight-related health problems. If your BMI is in the overweight or obese range, you may be at increased risk for weight-related health problems, such as high blood pressure, heart disease, stroke, arthritis or joint pain, and diabetes. If your BMI is in the underweight range, you may be at increased risk for health problems such as fatigue, lower protection (immunity) against illness, muscle loss, bone loss, hair loss, and hormone problems. BMI is just one measure of your risk for weight-related health problems. You may be at higher risk for health problems if you are not active, you eat an unhealthy diet, or you drink too much alcohol or use tobacco products. Follow-up care is a key part of your treatment and safety. Be sure to make and go to all appointments, and call your doctor if you are having problems. It's also a good idea to know your test results and keep a list of the medicines you take. How can you care for yourself at home? · Practice healthy eating habits. This includes eating plenty of fruits, vegetables, whole grains, lean protein, and low-fat dairy. · If your doctor recommends it, get more exercise. Walking is a good choice. Bit by bit, increase the amount you walk every day. Try for at least 30 minutes on most days of the week. · Do not smoke. Smoking can increase your risk for health problems. If you need help quitting, talk to your doctor about stop-smoking programs and medicines. These can increase your chances of quitting for good. · Limit alcohol to 2 drinks a day for men and 1 drink a day for women. Too much alcohol can cause health problems. If you have a BMI higher than 25 · Your doctor may do other tests to check your risk for weight-related health problems. This may include measuring the distance around your waist. A waist measurement of more than 40 inches in men or 35 inches in women can increase the risk of weight-related health problems. · Talk with your doctor about steps you can take to stay healthy or improve your health. You may need to make lifestyle changes to lose weight and stay healthy, such as changing your diet and getting regular exercise. If you have a BMI lower than 18.5 · Your doctor may do other tests to check your risk for health problems. · Talk with your doctor about steps you can take to stay healthy or improve your health. You may need to make lifestyle changes to gain or maintain weight and stay healthy, such as getting more healthy foods in your diet and doing exercises to build muscle. Where can you learn more? Go to http://shirley-pawan.info/. Enter S176 in the search box to learn more about \"Body Mass Index: Care Instructions. \" Current as of: October 13, 2016 Content Version: 11.4 © 3611-1240 [employer]. Care instructions adapted under license by [employer] (which disclaims liability or warranty for this information). If you have questions about a medical condition or this instruction, always ask your healthcare professional. [address] any warranty or liability for your use of this information. Introducing [facility]! Dear Alannah: Thank you for requesting a Slide account. Our records indicate that you already have an active Slide account. You can access your account anytime at [email or web address] Did you know that you can access your hospital and ER discharge instructions at any time in Slide? You can also review all of your test results from your hospital stay or ER visit. Additional Information If you have questions, please visit the Frequently Asked Questions section of the [employer] website at [email or web address] Remember, [employer] is NOT to be used for urgent needs. For medical emergencies, dial 911. Now available from your iPhone and Android! Please provide this summary of care documentation to your next provider. Your primary care clinician is listed as Mitchell Chavez. If you have any questions after today's visit, please call [phone].

## 2018-01-24 ENCOUNTER — CLINICAL SUPPORT (OUTPATIENT)
Dept: FAMILY MEDICINE CLINIC | Age: 38
End: 2018-01-24

## 2018-01-24 DIAGNOSIS — E66.9 OBESITY (BMI 30-39.9): Primary | ICD-10-CM

## 2018-01-25 VITALS
SYSTOLIC BLOOD PRESSURE: 129 MMHG | WEIGHT: 219 LBS | HEIGHT: 65 IN | BODY MASS INDEX: 36.49 KG/M2 | DIASTOLIC BLOOD PRESSURE: 76 MMHG | HEART RATE: 82 BPM

## 2018-01-25 NOTE — PROGRESS NOTES
Progress Note: Weekly Education Class in the [facility]   Is there anything that you or the patient needs to let the [address] [facility] Physician know about? no  Over the past week, have you experienced any side-effects? no    Dorothea Blum is a 44 y.o. female who is enrolled in [facility]    Visit Vitals    /76 (BP 1 Location: Right arm, BP Patient Position: Sitting)    Pulse 82    Ht 5' 5\" (1.651 m)    Wt 219 lb (99.3 kg)    BMI 36.44 kg/m2     Weight Metrics 1/25/2018 1/24/2018 1/18/2018 1/18/2018 1/11/2018 1/10/2018 12/21/2017   Weight - 219 lb - 220 lb - 225 lb 6.4 oz -   Waist Measure Inches 40 - 39 - 37 - 36   Exercise Mins/week - - - - - - 150   Body Fat % - - 40.6 - - - 40.8   BMI - 36.44 kg/m2 - 36.61 kg/m2 - 37.51 kg/m2 -         Have you received any other medical care this week? no  If yes, where and for what? Have you had any change in your medications since your last visit? no  If yes what? Did you have any problems adhering to the program last week? no  If yes, please explain:       Eating Habits Over Last Week:  Did you take in 64 oz of non-caloric fluids? yes    Did you consume your 4 meal replacements each day?  yes       Physical Activity Over the Past Week:    Aerobic exercise: 120 min  Resistance exercise: 120 min workouts / week

## 2018-01-31 ENCOUNTER — CLINICAL SUPPORT (OUTPATIENT)
Dept: FAMILY MEDICINE CLINIC | Age: 38
End: 2018-01-31

## 2018-01-31 VITALS
HEIGHT: 65 IN | SYSTOLIC BLOOD PRESSURE: 121 MMHG | BODY MASS INDEX: 36.06 KG/M2 | HEART RATE: 76 BPM | DIASTOLIC BLOOD PRESSURE: 79 MMHG | WEIGHT: 216.4 LBS

## 2018-01-31 DIAGNOSIS — E66.9 OBESITY (BMI 30-39.9): Primary | ICD-10-CM

## 2018-01-31 NOTE — PROGRESS NOTES
Progress Note: Weekly Education Class in the [facility]   Is there anything that you or the patient needs to let the [address] [facility] Physician know about? no  Over the past week, have you experienced any side-effects? no    Marva Becerra is a 44 y.o. female who is enrolled in [facility]    Visit Vitals    /79 (BP 1 Location: Right arm, BP Patient Position: Sitting)    Pulse 76    Ht 5' 5\" (1.651 m)    Wt 216 lb 6.4 oz (98.2 kg)    BMI 36.01 kg/m2     Weight Metrics 1/31/2018 1/25/2018 1/24/2018 1/18/2018 1/18/2018 1/11/2018 1/10/2018   Weight 216 lb 6.4 oz - 219 lb - 220 lb - 225 lb 6.4 oz   Waist Measure Inches 36 40 - 39 - 37 -   Exercise Mins/week - - - - - - -   Body Fat % - - - 40.6 - - -   BMI 36.01 kg/m2 - 36.44 kg/m2 - 36.61 kg/m2 - 37.51 kg/m2         Have you received any other medical care this week? no  If yes, where and for what? Have you had any change in your medications since your last visit? no  If yes what? Did you have any problems adhering to the program last week? no  If yes, please explain:       Eating Habits Over Last Week:  Did you take in 64 oz of non-caloric fluids? yes     Did you consume your 4 meal replacements each day?  yes       Physical Activity Over the Past Week:    Aerobic exercise: 120 min  Resistance exercise: 60 min workouts / week

## 2018-02-14 ENCOUNTER — CLINICAL SUPPORT (OUTPATIENT)
Dept: FAMILY MEDICINE CLINIC | Age: 38
End: 2018-02-14

## 2018-02-14 DIAGNOSIS — E66.9 OBESITY (BMI 30-39.9): Primary | ICD-10-CM

## 2018-02-15 ENCOUNTER — OFFICE VISIT (OUTPATIENT)
Dept: INTERNAL MEDICINE CLINIC | Age: 38
End: 2018-02-15

## 2018-02-15 VITALS
BODY MASS INDEX: 35.69 KG/M2 | DIASTOLIC BLOOD PRESSURE: 77 MMHG | WEIGHT: 214.2 LBS | HEIGHT: 65 IN | HEART RATE: 83 BPM | SYSTOLIC BLOOD PRESSURE: 118 MMHG

## 2018-02-15 VITALS
SYSTOLIC BLOOD PRESSURE: 110 MMHG | HEIGHT: 65 IN | RESPIRATION RATE: 12 BRPM | HEART RATE: 78 BPM | DIASTOLIC BLOOD PRESSURE: 76 MMHG | BODY MASS INDEX: 35.24 KG/M2 | WEIGHT: 211.5 LBS | OXYGEN SATURATION: 100 % | TEMPERATURE: 97.3 F

## 2018-02-15 DIAGNOSIS — E66.9 OBESITY (BMI 30-39.9): Primary | ICD-10-CM

## 2018-02-15 DIAGNOSIS — E03.9 ACQUIRED HYPOTHYROIDISM: ICD-10-CM

## 2018-02-15 DIAGNOSIS — E06.3 HASHIMOTO'S DISEASE: ICD-10-CM

## 2018-02-15 DIAGNOSIS — K76.0 FATTY LIVER DISEASE, NONALCOHOLIC: ICD-10-CM

## 2018-02-15 RX ORDER — PHENTERMINE HYDROCHLORIDE 15 MG/1
CAPSULE ORAL
Qty: 30 CAP | Refills: 0 | Status: SHIPPED | OUTPATIENT
Start: 2018-02-15 | End: 2018-03-15 | Stop reason: SDUPTHER

## 2018-02-15 RX ORDER — PHENTERMINE HYDROCHLORIDE 15 MG/1
CAPSULE ORAL
Refills: 0 | COMMUNITY
Start: 2018-02-02 | End: 2018-02-15 | Stop reason: SDUPTHER

## 2018-02-15 NOTE — MR AVS SNAPSHOT
[facility] 
 
 
 [address], [address] [place] [address] [facility] 
[phone] Patient: Lorna Lewis 
MRN: [id number] :[id number] Visit Information Date & Time Provider Department Dept. Phone Encounter #  
 2/15/2018  9:45 AM Ulysses Valdes NP [facility] [id number] Follow-up Instructions Return in about 4 weeks (around 3/15/2018). Your Appointments 3/15/2018  4:77 AM  
METABOLIC PROGRAM 15 with Ulysses Valdes NP [facility] ([facility]) Appt Note: metabolic program alfredo [address], [address] [place] [address] [place]  
  
   
 [address], [address] Upcoming Health Maintenance Date Due DTaP/Tdap/Td series (1 - Tdap) 2000 PAP AKA CERVICAL CYTOLOGY 2000 Influenza Age 5 to Adult 2017 Allergies as of 2/15/2018  Review Complete On: 2/15/2018 By: Ulysses Valdes NP Severity Noted Reaction Type Reactions Sulfa (Sulfonamide Antibiotics)  04/15/2015    Rash Current Immunizations  Never Reviewed No immunizations on file. Not reviewed this visit You Were Diagnosed With   
  
 Codes Comments Obesity (BMI 30-39.9)    -  Primary ICD-10-CM: D44.2 ICD-9-CM: 278.00 Acquired hypothyroidism     ICD-10-CM: E03.9 ICD-9-CM: 244.9 Hashimoto's disease     ICD-10-CM: E06.3 ICD-9-CM: 238. 2 Fatty liver disease, nonalcoholic     IUT-33-JX: Q44.2 ICD-9-CM: 571.8 Vitals BP Pulse Temp Resp Height(growth percentile) Weight(growth percentile) 110/76 (BP 1 Location: Left arm, BP Patient Position: Sitting) 78 97.3 °F (36.3 °C) (Oral) 12 5' 5\" (1.651 m) 211 lb 8 oz (95.9 kg) SpO2 BMI OB Status Smoking Status 100% 35.2 kg/m2 Polycystic Ovarian Syndrome Never Smoker BMI and BSA Data  Body Mass Index Body Surface Area  
 35.2 kg/m 2 2.1 m 2  
  
 Preferred Pharmacy Pharmacy Name Phone [id number], [address] [place] [address] Rd [phone] Your Updated Medication List  
  
   
This list is accurate as of: 2/15/18 10:17 AM.  Always use your most recent med list.  
  
  
  
  
 ADVAIR DISKUS 250-50 mcg/dose diskus inhaler Generic drug:  fluticasone-salmeterol Take 1 Puff by inhalation every twelve (12) hours as needed. albuterol 2.5 mg /3 mL (0.083 %) nebulizer solution Commonly known as:  PROVENTIL VENTOLIN  
by Nebulization route once. Indications: ACUTE ASTHMA ATTACK Biotin-Silicon Diox-L-Cysteine 5,000 mcg -100 mg-50 mg Tab Take 5,000 mcg by mouth two (2) times a day. cholecalciferol (VITAMIN D3) 5,000 unit Tab tablet Commonly known as:  VITAMIN D3 Take 5,000 Units by mouth daily. levothyroxine 137 mcg tablet Commonly known as:  SYNTHROID Take 137 mcg by mouth Daily (before breakfast). MIRALAX PO Take  by mouth every other day. phentermine 15 mg capsule Commonly known as:  ADIPEX_P  
TAKE 1 CAPSULE BY MOUTH ONCE DAILY. MAXIMUM DAILY AMOUNT OF 1 CAPSULE A DAY PREMPRO 0.625-2.5 mg per tablet Generic drug:  estrogen (conjugated)-medroxyPROGESTERone Take 1 Tab by mouth daily. topiramate 25 mg tablet Commonly known as:  TOPAMAX Take 1 Tab by mouth two (2) times a day. Prescriptions Printed Refills  
 phentermine (ADIPEX_P) 15 mg capsule 0 Sig: TAKE 1 CAPSULE BY MOUTH ONCE DAILY. MAXIMUM DAILY AMOUNT OF 1 CAPSULE A DAY Class: Print Follow-up Instructions Return in about 4 weeks (around 3/15/2018). To-Do List   
 02/15/2018 Lab:  METABOLIC PANEL, COMPREHENSIVE   
  
 02/15/2018 Lab:  URIC ACID Patient Instructions Health Maintenance Due Topic Date Due  
 DTaP/Tdap/Td series (1 - Tdap) 08/22/2000  PAP AKA CERVICAL CYTOLOGY  08/22/2000  Influenza Age 5 to Adult  08/01/2017 Keep up the good work. Monthly goal 10-15 lbs Body Mass Index: Care Instructions Your Care Instructions Body mass index (BMI) can help you see if your weight is raising your risk for health problems. It uses a formula to compare how much you weigh with how tall you are. · A BMI lower than 18.5 is considered underweight. · A BMI between 18.5 and 24.9 is considered healthy. · A BMI between 25 and 29.9 is considered overweight. A BMI of 30 or higher is considered obese. If your BMI is in the normal range, it means that you have a lower risk for weight-related health problems. If your BMI is in the overweight or obese range, you may be at increased risk for weight-related health problems, such as high blood pressure, heart disease, stroke, arthritis or joint pain, and diabetes. If your BMI is in the underweight range, you may be at increased risk for health problems such as fatigue, lower protection (immunity) against illness, muscle loss, bone loss, hair loss, and hormone problems. BMI is just one measure of your risk for weight-related health problems. You may be at higher risk for health problems if you are not active, you eat an unhealthy diet, or you drink too much alcohol or use tobacco products. Follow-up care is a key part of your treatment and safety. Be sure to make and go to all appointments, and call your doctor if you are having problems. It's also a good idea to know your test results and keep a list of the medicines you take. How can you care for yourself at home? · Practice healthy eating habits. This includes eating plenty of fruits, vegetables, whole grains, lean protein, and low-fat dairy. · If your doctor recommends it, get more exercise. Walking is a good choice. Bit by bit, increase the amount you walk every day. Try for at least 30 minutes on most days of the week. · Do not smoke. Smoking can increase your risk for health problems. If you need help quitting, talk to your doctor about stop-smoking programs and medicines. These can increase your chances of quitting for good. · Limit alcohol to 2 drinks a day for men and 1 drink a day for women. Too much alcohol can cause health problems. If you have a BMI higher than 25 · Your doctor may do other tests to check your risk for weight-related health problems. This may include measuring the distance around your waist. A waist measurement of more than 40 inches in men or 35 inches in women can increase the risk of weight-related health problems. · Talk with your doctor about steps you can take to stay healthy or improve your health. You may need to make lifestyle changes to lose weight and stay healthy, such as changing your diet and getting regular exercise. If you have a BMI lower than 18.5 · Your doctor may do other tests to check your risk for health problems. · Talk with your doctor about steps you can take to stay healthy or improve your health. You may need to make lifestyle changes to gain or maintain weight and stay healthy, such as getting more healthy foods in your diet and doing exercises to build muscle. Where can you learn more? Go to http://shirley-pawan.info/. Enter S176 in the search box to learn more about \"Body Mass Index: Care Instructions. \" Current as of: October 13, 2016 Content Version: 11.4 © 3363-7700 Healthwise, Incorporated. Care instructions adapted under license by [employer] (which disclaims liability or warranty for this information). If you have questions about a medical condition or this instruction, always ask your healthcare professional. [place] [address] any warranty or liability for your use of this information. Introducing [facility] & HEALTH SERVICES! Dear Fred Levin: Thank you for requesting a [employer] account.   Our records indicate that you already have an active Numer[employer] account. You can access your account anytime at [email or web address] Did you know that you can access your hospital and ER discharge instructions at any time in Numer[employer]? You can also review all of your test results from your hospital stay or ER visit. Additional Information If you have questions, please visit the Frequently Asked Questions section of the Numer[employer] website at [email or web address] Remember, Numer[employer] is NOT to be used for urgent needs. For medical emergencies, dial 911. Now available from your iPhone and Android! Please provide this summary of care documentation to your next provider. Your primary care clinician is listed as Diego Godinez. If you have any questions after today's visit, please call [phone].

## 2018-02-15 NOTE — PROGRESS NOTES
Progress Note: Weekly Education Class in the [facility] Loss Program   Is there anything that you or the patient needs to let the [facility] Physician know about? no  Over the past week, have you experienced any side-effects? no    Narendra Gilbert is a 44 y.o. female who is enrolled in [facility]    Visit Vitals    /77 (BP 1 Location: Right arm, BP Patient Position: Sitting)    Pulse 83    Ht 5' 5\" (1.651 m)    Wt 214 lb 3.2 oz (97.2 kg)    BMI 35.64 kg/m2     Weight Metrics 2/15/2018 2/14/2018 1/31/2018 1/25/2018 1/24/2018 1/18/2018 1/18/2018   Weight - 214 lb 3.2 oz 216 lb 6.4 oz - 219 lb - 220 lb   Waist Measure Inches 37 - 36 40 - 39 -   Exercise Mins/week - - - - - - -   Body Fat % - - - - - 40.6 -   BMI - 35.64 kg/m2 36.01 kg/m2 - 36.44 kg/m2 - 36.61 kg/m2         Have you received any other medical care this week? no  If yes, where and for what? Have you had any change in your medications since your last visit? no  If yes what? Did you have any problems adhering to the program last week? no  If yes, please explain:       Eating Habits Over Last Week:  Did you take in 64 oz of non-caloric fluids?  yes     Did you consume your 4 meal replacements each day? no       Physical Activity Over the Past Week:    Aerobic exercise: 300 min  Resistance exercise: 0 workouts / week

## 2018-02-15 NOTE — PROGRESS NOTES
Chief Complaint   Patient presents with    Weight Management     1. Have you been to the ER, urgent care clinic since your last visit? Hospitalized since your last visit? No    2. Have you seen or consulted any other health care providers outside of the [address] [place] [address] since your last visit? Include any pap smears or colon screening.  No

## 2018-02-15 NOTE — PROGRESS NOTES
[facility] Progress Note:   F/up Physician Visit    CC: Obesity      Sweetie Gordon is a 44 y.o. female who is here for her  f/up physician visit for the VLCD Program. Korin Lozada has completed 57 weeks of the program to date. 9 lbs weight loss      Starting weight: 4/15/15 at 298 lbs  Current weight: 211 lbs  Goal weight: 170 lbs      Most recent EKG: 10/14/15 at 194lbs    Weight Metrics 2/15/2018 2/15/2018 2/14/2018 1/31/2018 1/25/2018 1/24/2018 1/18/2018   Weight - 211 lb 8 oz 214 lb 3.2 oz 216 lb 6.4 oz - 219 lb -   Waist Measure Inches 36 37 - 36 40 - 39   Exercise Mins/week 180 - - - - - -   Body Fat % 40.4 - - - - - 40.6   BMI - 35.2 kg/m2 35.64 kg/m2 36.01 kg/m2 - 36.44 kg/m2 -         Current Outpatient Prescriptions   Medication Sig Dispense Refill    phentermine (ADIPEX_P) 15 mg capsule TAKE 1 CAPSULE BY MOUTH ONCE DAILY. MAXIMUM DAILY AMOUNT OF 1 CAPSULE A DAY 30 Cap 0    topiramate (TOPAMAX) 25 mg tablet Take 1 Tab by mouth two (2) times a day. 60 Tab 2    cholecalciferol, VITAMIN D3, (VITAMIN D3) 5,000 unit tab tablet Take 5,000 Units by mouth daily.  POLYETHYLENE GLYCOL 3350 (MIRALAX PO) Take  by mouth every other day.  levothyroxine (SYNTHROID) 137 mcg tablet Take 137 mcg by mouth Daily (before breakfast).  Biotin-Silicon Diox-L-Cysteine 5,000 mcg-100 mg- 50 mg tab Take 5,000 mcg by mouth two (2) times a day.  fluticasone-salmeterol (ADVAIR DISKUS) 250-50 mcg/dose diskus inhaler Take 1 Puff by inhalation every twelve (12) hours as needed.  albuterol (PROVENTIL VENTOLIN) 2.5 mg /3 mL (0.083 %) nebulizer solution by Nebulization route once. Indications: ACUTE ASTHMA ATTACK      estrogen, conjugated,-medroxyPROGESTERone (PREMPRO) 0.625-2.5 mg per tablet Take 1 Tab by mouth daily.            Participation   Did you attend clinic and class last week? no    Review of Systems  Since your last visit, have you experienced any complications? no  If yes, please list:     No CP, SOB, palpitations, lightheadedness, dizziness, constipation. Positives highlight in BOLD. Are you taking an appetite suppressant? yes  If so, is there any Chest Pain, Palpitations or Dizziness? no  BP Readings from Last 10 Encounters:   02/15/18 110/76   02/15/18 118/77   01/31/18 121/79   01/25/18 129/76   01/18/18 106/70   01/11/18 126/80   12/21/17 110/70   11/29/17 121/81   11/22/17 115/75   11/09/17 116/78         Have you received any other medical care this week? no  If yes, where and for what? Have you discontinued or changed any medicine or dose of your medicine since your last visit? no  If yes, where and for what? Diet  How many ounces of calorie-free liquids did you consume each day?  100 oz    How many meal replacements did you take each day? 4    Did you have any problems adhering to the program?  no If yes, please explain:       Exercise  Aerobic exercise: 180 min  Resistance exercise: 180 min workouts / week  Any discomfort?  no     If yes, where? Review of Systems  Complete ROS negative except where noted above    Objective  Visit Vitals    /76 (BP 1 Location: Left arm, BP Patient Position: Sitting)    Pulse 78    Temp 97.3 °F (36.3 °C) (Oral)    Resp 12    Ht 5' 5\" (1.651 m)    Wt 211 lb 8 oz (95.9 kg)    SpO2 100%    BMI 35.2 kg/m2     No LMP recorded. Patient is not currently having periods (Reason: Polycystic Ovarian Syndrome).     PHQ over the last two weeks 1/4/2017   Little interest or pleasure in doing things Not at all   Feeling down, depressed or hopeless Not at all   Total Score PHQ 2 0         Waist Circumference: I personally reviewed patient's Weight Management Doc Flowsheet  Neck Circumference: I personally reviewed patient's Weight Management Doc Flowsheet  Percent Body Fat: I personally reviewed patient's Weight Management Doc Flowsheet    Physical Exam  Appearance: well appearing, obese, A&O, NAD  HEENT:  NC/AT, PERRL, No scleral icterus  Heart:  RRR without M/R/G  Lungs:  CTAB, no rhonchi, rales, or wheezes with good air exchange   Ext:  No LE Edema    Assessment / Plan    Encounter Diagnoses   Name Primary?  Obesity (BMI 30-39. 9) Yes    Acquired hypothyroidism     Hashimoto's disease     Fatty liver disease, nonalcoholic        1. Weight management well controlled, improved   Progress was reviewed with patient    2. Labs    Latest results reviewed with patient   Lab slip given to pt for f/up HDL labs    3. Diet regimen   # of meal replacements prescribed: 4   If modified LCD-nutritional guidelines:    Monthly Goal   As below    Medical monitoring schedule:   Weekly BP/Weight checks   Monthly provider appointments  I have reviewed/discussed the above normal BMI with the patient. I have recommended the following interventions: dietary management education, guidance, and counseling and prescribed dietary intake . .      Ms. Rc Cuenca has a reminder for a \"due or due soon\" health maintenance. I have asked that she contact her primary care provider for follow-up on this health maintenance. Patient Instructions     Health Maintenance Due   Topic Date Due    DTaP/Tdap/Td series (1 - Tdap) 08/22/2000    PAP AKA CERVICAL CYTOLOGY  08/22/2000    Influenza Age 5 to Adult  08/01/2017     Keep up the good work. Monthly goal 10-15 lbs           Body Mass Index: Care Instructions  Your Care Instructions    Body mass index (BMI) can help you see if your weight is raising your risk for health problems. It uses a formula to compare how much you weigh with how tall you are. · A BMI lower than 18.5 is considered underweight. · A BMI between 18.5 and 24.9 is considered healthy. · A BMI between 25 and 29.9 is considered overweight. A BMI of 30 or higher is considered obese. If your BMI is in the normal range, it means that you have a lower risk for weight-related health problems.  If your BMI is in the overweight or obese range, you may be at increased risk for weight-related health problems, such as high blood pressure, heart disease, stroke, arthritis or joint pain, and diabetes. If your BMI is in the underweight range, you may be at increased risk for health problems such as fatigue, lower protection (immunity) against illness, muscle loss, bone loss, hair loss, and hormone problems. BMI is just one measure of your risk for weight-related health problems. You may be at higher risk for health problems if you are not active, you eat an unhealthy diet, or you drink too much alcohol or use tobacco products. Follow-up care is a key part of your treatment and safety. Be sure to make and go to all appointments, and call your doctor if you are having problems. It's also a good idea to know your test results and keep a list of the medicines you take. How can you care for yourself at home? · Practice healthy eating habits. This includes eating plenty of fruits, vegetables, whole grains, lean protein, and low-fat dairy. · If your doctor recommends it, get more exercise. Walking is a good choice. Bit by bit, increase the amount you walk every day. Try for at least 30 minutes on most days of the week. · Do not smoke. Smoking can increase your risk for health problems. If you need help quitting, talk to your doctor about stop-smoking programs and medicines. These can increase your chances of quitting for good. · Limit alcohol to 2 drinks a day for men and 1 drink a day for women. Too much alcohol can cause health problems. If you have a BMI higher than 25  · Your doctor may do other tests to check your risk for weight-related health problems. This may include measuring the distance around your waist. A waist measurement of more than 40 inches in men or 35 inches in women can increase the risk of weight-related health problems. · Talk with your doctor about steps you can take to stay healthy or improve your health.  You may need to make lifestyle changes to lose weight and stay healthy, such as changing your diet and getting regular exercise. If you have a BMI lower than 18.5  · Your doctor may do other tests to check your risk for health problems. · Talk with your doctor about steps you can take to stay healthy or improve your health. You may need to make lifestyle changes to gain or maintain weight and stay healthy, such as getting more healthy foods in your diet and doing exercises to build muscle. Where can you learn more? Go to http://shirley-pawan.info/. Enter S176 in the search box to learn more about \"Body Mass Index: Care Instructions. \"  Current as of: October 13, 2016  Content Version: 11.4  © 6055-9720 Solar[employer]. Care instructions adapted under license by [employer] (which disclaims liability or warranty for this information). If you have questions about a medical condition or this instruction, always ask your healthcare professional. [place] [address] any warranty or liability for your use of this information. Follow-up Disposition:  Return in about 4 weeks (around 3/15/2018). 10 minutes of the 15 minutes face to face time with Srikanth Enamorado consisted of counseling & coordinating and/or discussing treatment plans in reference to her The primary encounter diagnosis was Obesity (BMI 30-39.9). Diagnoses of Acquired hypothyroidism, Hashimoto's disease, and Fatty liver disease, nonalcoholic were also pertinent to this visit. The patient is to follow up as scheduled and will report to the ED or the office if symptoms change or increase. The patient has voiced understanding and will comply.

## 2018-02-15 NOTE — PATIENT INSTRUCTIONS
Health Maintenance Due   Topic Date Due    DTaP/Tdap/Td series (1 - Tdap) 08/22/2000    PAP AKA CERVICAL CYTOLOGY  08/22/2000    Influenza Age 5 to Adult  08/01/2017     Keep up the good work. Monthly goal 10-15 lbs           Body Mass Index: Care Instructions  Your Care Instructions    Body mass index (BMI) can help you see if your weight is raising your risk for health problems. It uses a formula to compare how much you weigh with how tall you are. · A BMI lower than 18.5 is considered underweight. · A BMI between 18.5 and 24.9 is considered healthy. · A BMI between 25 and 29.9 is considered overweight. A BMI of 30 or higher is considered obese. If your BMI is in the normal range, it means that you have a lower risk for weight-related health problems. If your BMI is in the overweight or obese range, you may be at increased risk for weight-related health problems, such as high blood pressure, heart disease, stroke, arthritis or joint pain, and diabetes. If your BMI is in the underweight range, you may be at increased risk for health problems such as fatigue, lower protection (immunity) against illness, muscle loss, bone loss, hair loss, and hormone problems. BMI is just one measure of your risk for weight-related health problems. You may be at higher risk for health problems if you are not active, you eat an unhealthy diet, or you drink too much alcohol or use tobacco products. Follow-up care is a key part of your treatment and safety. Be sure to make and go to all appointments, and call your doctor if you are having problems. It's also a good idea to know your test results and keep a list of the medicines you take. How can you care for yourself at home? · Practice healthy eating habits. This includes eating plenty of fruits, vegetables, whole grains, lean protein, and low-fat dairy. · If your doctor recommends it, get more exercise. Walking is a good choice.  Bit by bit, increase the amount you walk every day. Try for at least 30 minutes on most days of the week. · Do not smoke. Smoking can increase your risk for health problems. If you need help quitting, talk to your doctor about stop-smoking programs and medicines. These can increase your chances of quitting for good. · Limit alcohol to 2 drinks a day for men and 1 drink a day for women. Too much alcohol can cause health problems. If you have a BMI higher than 25  · Your doctor may do other tests to check your risk for weight-related health problems. This may include measuring the distance around your waist. A waist measurement of more than 40 inches in men or 35 inches in women can increase the risk of weight-related health problems. · Talk with your doctor about steps you can take to stay healthy or improve your health. You may need to make lifestyle changes to lose weight and stay healthy, such as changing your diet and getting regular exercise. If you have a BMI lower than 18.5  · Your doctor may do other tests to check your risk for health problems. · Talk with your doctor about steps you can take to stay healthy or improve your health. You may need to make lifestyle changes to gain or maintain weight and stay healthy, such as getting more healthy foods in your diet and doing exercises to build muscle. Where can you learn more? Go to http://shirley-apwan.info/. Enter S176 in the search box to learn more about \"Body Mass Index: Care Instructions. \"  Current as of: October 13, 2016  Content Version: 11.4  © 0218-8609 Healthwise, [employer]. Care instructions adapted under license by [employer] (which disclaims liability or warranty for this information). If you have questions about a medical condition or this instruction, always ask your healthcare professional. [place] [address] any warranty or liability for your use of this information.

## 2018-02-21 ENCOUNTER — CLINICAL SUPPORT (OUTPATIENT)
Dept: FAMILY MEDICINE CLINIC | Age: 38
End: 2018-02-21

## 2018-02-21 VITALS
HEIGHT: 65 IN | DIASTOLIC BLOOD PRESSURE: 78 MMHG | SYSTOLIC BLOOD PRESSURE: 117 MMHG | HEART RATE: 82 BPM | BODY MASS INDEX: 35.29 KG/M2 | WEIGHT: 211.8 LBS

## 2018-02-21 DIAGNOSIS — E66.9 OBESITY (BMI 30-39.9): Primary | ICD-10-CM

## 2018-02-21 NOTE — PROGRESS NOTES
Progress Note: Weekly Education Class in the [facility] Weight Loss Program   Is there anything that you or the patient needs to let the [facility] Physician know about? no  Over the past week, have you experienced any side-effects? no    Wagner Hodges is a 44 y.o. female who is enrolled in [facility] Loss Program    Visit Vitals    /78 (BP 1 Location: Right arm, BP Patient Position: Sitting)    Pulse 82    Ht 5' 5\" (1.651 m)    Wt 211 lb 12.8 oz (96.1 kg)    BMI 35.25 kg/m2     Weight Metrics 2/21/2018 2/15/2018 2/15/2018 2/14/2018 1/31/2018 1/25/2018 1/24/2018   Weight 211 lb 12.8 oz - 211 lb 8 oz 214 lb 3.2 oz 216 lb 6.4 oz - 219 lb   Waist Measure Inches 34 36 37 - 36 40 -   Exercise Mins/week - 180 - - - - -   Body Fat % - 40.4 - - - - -   BMI 35.25 kg/m2 - 35.2 kg/m2 35.64 kg/m2 36.01 kg/m2 - 36.44 kg/m2         Have you received any other medical care this week? no  If yes, where and for what? Have you had any change in your medications since your last visit? no  If yes what? Did you have any problems adhering to the program last week? no  If yes, please explain:       Eating Habits Over Last Week:  Did you take in 64 oz of non-caloric fluids? yes     Did you consume your 4 meal replacements each day?  yes       Physical Activity Over the Past Week:    Aerobic exercise: 140 min  Resistance exercise: 140 min workouts / week

## 2018-02-28 ENCOUNTER — CLINICAL SUPPORT (OUTPATIENT)
Dept: FAMILY MEDICINE CLINIC | Age: 38
End: 2018-02-28

## 2018-02-28 DIAGNOSIS — E66.9 OBESITY (BMI 30-39.9): Primary | ICD-10-CM

## 2018-03-01 VITALS
BODY MASS INDEX: 34.75 KG/M2 | WEIGHT: 208.6 LBS | HEART RATE: 93 BPM | HEIGHT: 65 IN | SYSTOLIC BLOOD PRESSURE: 110 MMHG | DIASTOLIC BLOOD PRESSURE: 76 MMHG

## 2018-03-01 NOTE — PROGRESS NOTES
Progress Note: Weekly Education Class in the [facility] Weight Loss Program   Is there anything that you or the patient needs to let the [facility] Physician know about? no  Over the past week, have you experienced any side-effects? no    Kristie Klein is a 44 y.o. female who is enrolled in [facility] Weight Loss Program    Visit Vitals    /76 (BP 1 Location: Right arm, BP Patient Position: Sitting)    Pulse 93    Ht 5' 5\" (1.651 m)    Wt 208 lb 9.6 oz (94.6 kg)    BMI 34.71 kg/m2     Weight Metrics 3/1/2018 2/28/2018 2/21/2018 2/15/2018 2/15/2018 2/14/2018 1/31/2018   Weight - 208 lb 9.6 oz 211 lb 12.8 oz - 211 lb 8 oz 214 lb 3.2 oz 216 lb 6.4 oz   Waist Measure Inches 35 - 34 36 37 - 36   Exercise Mins/week - - - 180 - - -   Body Fat % - - - 40.4 - - -   BMI - 34.71 kg/m2 35.25 kg/m2 - 35.2 kg/m2 35.64 kg/m2 36.01 kg/m2         Have you received any other medical care this week? no  If yes, where and for what? Have you had any change in your medications since your last visit? no  If yes what? Did you have any problems adhering to the program last week? no  If yes, please explain:       Eating Habits Over Last Week:  Did you take in 64 oz of non-caloric fluids? yes     Did you consume your 4 meal replacements each day?  yes       Physical Activity Over the Past Week:    Aerobic exercise: 120 min  Resistance exercise: 120 min workouts / week

## 2018-03-07 ENCOUNTER — CLINICAL SUPPORT (OUTPATIENT)
Dept: FAMILY MEDICINE CLINIC | Age: 38
End: 2018-03-07

## 2018-03-07 DIAGNOSIS — E66.9 OBESITY (BMI 30-39.9): Primary | ICD-10-CM

## 2018-03-08 VITALS
BODY MASS INDEX: 34.99 KG/M2 | DIASTOLIC BLOOD PRESSURE: 77 MMHG | HEART RATE: 84 BPM | WEIGHT: 210 LBS | SYSTOLIC BLOOD PRESSURE: 113 MMHG | HEIGHT: 65 IN

## 2018-03-08 NOTE — PROGRESS NOTES
Progress Note: Weekly Education Class in the [facility] Weight Loss Program   Is there anything that you or the patient needs to let the [facility] Physician know about? no  Over the past week, have you experienced any side-effects? no    Joel Neely is a 44 y.o. female who is enrolled in [facility] Loss Program    Visit Vitals    /77 (BP 1 Location: Right arm, BP Patient Position: Sitting)    Pulse 84    Ht 5' 5\" (1.651 m)    Wt 210 lb (95.3 kg)    BMI 34.95 kg/m2     Weight Metrics 3/8/2018 3/7/2018 3/1/2018 2/28/2018 2/21/2018 2/15/2018 2/15/2018   Weight - 210 lb - 208 lb 9.6 oz 211 lb 12.8 oz - 211 lb 8 oz   Waist Measure Inches 36 - 35 - 34 36 37   Exercise Mins/week - - - - - 180 -   Body Fat % - - - - - 40.4 -   BMI - 34.95 kg/m2 - 34.71 kg/m2 35.25 kg/m2 - 35.2 kg/m2         Have you received any other medical care this week? no  If yes, where and for what? Have you had any change in your medications since your last visit? no  If yes what? Did you have any problems adhering to the program last week? no  If yes, please explain:       Eating Habits Over Last Week:  Did you take in 64 oz of non-caloric fluids? yes     Did you consume your 4 meal replacements each day?  yes       Physical Activity Over the Past Week:    Aerobic exercise: 120 min  Resistance exercise: 120 min workouts / week

## 2018-03-15 ENCOUNTER — HOSPITAL ENCOUNTER (OUTPATIENT)
Dept: LAB | Age: 38
Discharge: HOME | End: 2018-03-15

## 2018-03-15 ENCOUNTER — OFFICE VISIT (OUTPATIENT)
Dept: INTERNAL MEDICINE CLINIC | Age: 38
End: 2018-03-15

## 2018-03-15 ENCOUNTER — APPOINTMENT (OUTPATIENT)
Dept: INTERNAL MEDICINE CLINIC | Age: 38
End: 2018-03-15

## 2018-03-15 VITALS
BODY MASS INDEX: 34.62 KG/M2 | TEMPERATURE: 98.5 F | RESPIRATION RATE: 14 BRPM | HEIGHT: 65 IN | HEART RATE: 82 BPM | WEIGHT: 207.8 LBS | DIASTOLIC BLOOD PRESSURE: 71 MMHG | SYSTOLIC BLOOD PRESSURE: 110 MMHG | OXYGEN SATURATION: 100 %

## 2018-03-15 DIAGNOSIS — E66.9 OBESITY (BMI 30-39.9): Primary | ICD-10-CM

## 2018-03-15 DIAGNOSIS — K76.0 FATTY LIVER DISEASE, NONALCOHOLIC: ICD-10-CM

## 2018-03-15 PROCEDURE — 99001 SPECIMEN HANDLING PT-LAB: CPT | Performed by: NURSE PRACTITIONER

## 2018-03-15 RX ORDER — PHENTERMINE HYDROCHLORIDE 15 MG/1
CAPSULE ORAL
Qty: 30 CAP | Refills: 0 | Status: SHIPPED | OUTPATIENT
Start: 2018-03-15 | End: 2018-04-12 | Stop reason: DRUGHIGH

## 2018-03-15 NOTE — PROGRESS NOTES
[facility] Progress Note:   F/up Physician Visit    CC: Obesity      Hemalatha Vasquez is a 44 y.o. female who is here for her  f/up physician visit for the [facility] Program. Simran Evangelista has completed 61 weeks of the program to date. 4 lbs weight loss since last visit. Starting weight: 4/15/15 at 298 lbs  Current weight: 211 lbs  Goal weight: 170 lbs      Most recent EKG: 10/14/15 at 194lbs    Weight Metrics 3/15/2018 3/8/2018 3/7/2018 3/1/2018 2/28/2018 2/21/2018 2/15/2018   Weight 207 lb 12.8 oz - 210 lb - 208 lb 9.6 oz 211 lb 12.8 oz -   Waist Measure Inches 36 36 - 35 - 34 36   Exercise Mins/week 120 - - - - - 180   Body Fat % 39.1 - - - - - 40.4   BMI 34.58 kg/m2 - 34.95 kg/m2 - 34.71 kg/m2 35.25 kg/m2 -         Current Outpatient Prescriptions   Medication Sig Dispense Refill    phentermine (ADIPEX_P) 15 mg capsule TAKE 1 CAPSULE BY MOUTH ONCE DAILY. MAXIMUM DAILY AMOUNT OF 1 CAPSULE A DAY 30 Cap 0    topiramate (TOPAMAX) 25 mg tablet Take 1 Tab by mouth two (2) times a day. 60 Tab 2    cholecalciferol, VITAMIN D3, (VITAMIN D3) 5,000 unit tab tablet Take 5,000 Units by mouth daily.  POLYETHYLENE GLYCOL 3350 (MIRALAX PO) Take  by mouth every other day.  levothyroxine (SYNTHROID) 137 mcg tablet Take 137 mcg by mouth Daily (before breakfast).  Biotin-Silicon Diox-L-Cysteine 5,000 mcg-100 mg- 50 mg tab Take 5,000 mcg by mouth two (2) times a day.  fluticasone-salmeterol (ADVAIR DISKUS) 250-50 mcg/dose diskus inhaler Take 1 Puff by inhalation every twelve (12) hours as needed.  albuterol (PROVENTIL VENTOLIN) 2.5 mg /3 mL (0.083 %) nebulizer solution by Nebulization route once. Indications: ACUTE ASTHMA ATTACK      estrogen, conjugated,-medroxyPROGESTERone (PREMPRO) 0.625-2.5 mg per tablet Take 1 Tab by mouth daily. Participation   Did you attend clinic and class last week?  yes    Review of Systems  Since your last visit, have you experienced any complications? no  If yes, please list:     No CP, SOB, palpitations, lightheadedness, dizziness, constipation. No  Positives highlight in BOLD. Are you taking an appetite suppressant? yes  If so, is there any Chest Pain, Palpitations or Dizziness? BP Readings from Last 10 Encounters:   03/15/18 110/71   03/08/18 113/77   03/01/18 110/76   02/21/18 117/78   02/15/18 110/76   02/15/18 118/77   01/31/18 121/79   01/25/18 129/76   01/18/18 106/70   01/11/18 126/80         Have you received any other medical care this week? no  If yes, where and for what? Have you discontinued or changed any medicine or dose of your medicine since your last visit? no  If yes, where and for what? Diet  How many ounces of calorie-free liquids did you consume each day?  100 oz    How many meal replacements did you take each day? 4    Did you have any problems adhering to the program?  no If yes, please explain:       Exercise  Aerobic exercise: 120 min  Resistance exercise: 120 min workouts / week  Any discomfort?  no     If yes, where? Review of Systems  Complete ROS negative except where noted above    Objective  Visit Vitals    /71 (BP 1 Location: Right arm, BP Patient Position: Sitting)    Pulse 82    Temp 98.5 °F (36.9 °C) (Oral)    Resp 14    Ht 5' 5\" (1.651 m)    Wt 207 lb 12.8 oz (94.3 kg)    SpO2 100%    BMI 34.58 kg/m2     No LMP recorded. Patient is not currently having periods (Reason: Polycystic Ovarian Syndrome).     PHQ over the last two weeks 1/4/2017   Little interest or pleasure in doing things Not at all   Feeling down, depressed or hopeless Not at all   Total Score PHQ 2 0         Waist Circumference: I personally reviewed patient's Weight Management Doc Flowsheet  Neck Circumference: I personally reviewed patient's Weight Management Doc Flowsheet  Percent Body Fat: I personally reviewed patient's Weight Management Doc Flowsheet    Physical Exam  Appearance: well appearing, obese, A&O, NAD  HEENT:  NC/AT, PERRL, No scleral icterus  Heart:  RRR without M/R/G  Lungs:  CTAB, no rhonchi, rales, or wheezes with good air exchange   Ext:  No LE Edema    Assessment / Plan    Encounter Diagnoses   Name Primary?  Obesity (BMI 30-39. 9) Yes    Fatty liver disease, nonalcoholic        1. Weight management improved, reasonably well controlled   Progress was reviewed with patient    2. Labs    Latest results reviewed with patient   Lab slip given to pt for f/up HDL labs    3. Diet regimen   # of meal replacements prescribed: 4   If modified LCD-nutritional guidelines:    Monthly Goal   As below    Medical monitoring schedule:   Weekly BP/Weight checks   Monthly provider appointments  I have reviewed/discussed the above normal BMI with the patient. I have recommended the following interventions: dietary management education, guidance, and counseling, encourage exercise and prescribed dietary intake . .      Ms. Cathern Najjar has a reminder for a \"due or due soon\" health maintenance. I have asked that she contact her primary care provider for follow-up on this health maintenance. Patient Instructions     Health Maintenance Due   Topic Date Due    DTaP/Tdap/Td series (1 - Tdap) 08/22/2000    PAP AKA CERVICAL CYTOLOGY  08/22/2000    Influenza Age 5 to Adult  08/01/2017     Monthly goal:    10-15 lbs weight loss   Body Mass Index: Care Instructions  Your Care Instructions    Body mass index (BMI) can help you see if your weight is raising your risk for health problems. It uses a formula to compare how much you weigh with how tall you are. · A BMI lower than 18.5 is considered underweight. · A BMI between 18.5 and 24.9 is considered healthy. · A BMI between 25 and 29.9 is considered overweight. A BMI of 30 or higher is considered obese. If your BMI is in the normal range, it means that you have a lower risk for weight-related health problems.  If your BMI is in the overweight or obese range, you may be at increased risk for weight-related health problems, such as high blood pressure, heart disease, stroke, arthritis or joint pain, and diabetes. If your BMI is in the underweight range, you may be at increased risk for health problems such as fatigue, lower protection (immunity) against illness, muscle loss, bone loss, hair loss, and hormone problems. BMI is just one measure of your risk for weight-related health problems. You may be at higher risk for health problems if you are not active, you eat an unhealthy diet, or you drink too much alcohol or use tobacco products. Follow-up care is a key part of your treatment and safety. Be sure to make and go to all appointments, and call your doctor if you are having problems. It's also a good idea to know your test results and keep a list of the medicines you take. How can you care for yourself at home? · Practice healthy eating habits. This includes eating plenty of fruits, vegetables, whole grains, lean protein, and low-fat dairy. · If your doctor recommends it, get more exercise. Walking is a good choice. Bit by bit, increase the amount you walk every day. Try for at least 30 minutes on most days of the week. · Do not smoke. Smoking can increase your risk for health problems. If you need help quitting, talk to your doctor about stop-smoking programs and medicines. These can increase your chances of quitting for good. · Limit alcohol to 2 drinks a day for men and 1 drink a day for women. Too much alcohol can cause health problems. If you have a BMI higher than 25  · Your doctor may do other tests to check your risk for weight-related health problems. This may include measuring the distance around your waist. A waist measurement of more than 40 inches in men or 35 inches in women can increase the risk of weight-related health problems. · Talk with your doctor about steps you can take to stay healthy or improve your health.  You may need to make lifestyle changes to lose weight and stay healthy, such as changing your diet and getting regular exercise. If you have a BMI lower than 18.5  · Your doctor may do other tests to check your risk for health problems. · Talk with your doctor about steps you can take to stay healthy or improve your health. You may need to make lifestyle changes to gain or maintain weight and stay healthy, such as getting more healthy foods in your diet and doing exercises to build muscle. Where can you learn more? Go to http://shirley-pawan.info/. Enter S176 in the search box to learn more about \"Body Mass Index: Care Instructions. \"  Current as of: October 13, 2016  Content Version: 11.4  © 7890-2349 [employer]. Care instructions adapted under license by [employer] (which disclaims liability or warranty for this information). If you have questions about a medical condition or this instruction, always ask your healthcare professional. [place] [address] any warranty or liability for your use of this information. Follow-up Disposition:  Return in about 4 weeks (around 4/12/2018). 10 minutes of the 15 minutes face to face time with Ivonne Ramírez consisted of counseling & coordinating and/or discussing treatment plans in reference to her The primary encounter diagnosis was Obesity (BMI 30-39.9). A diagnosis of Fatty liver disease, nonalcoholic was also pertinent to this visit. The patient is to follow up as scheduled and will report to the ED or the office if symptoms change or increase. The patient has voiced understanding and will comply.

## 2018-03-15 NOTE — PATIENT INSTRUCTIONS
Health Maintenance Due   Topic Date Due    DTaP/Tdap/Td series (1 - Tdap) 08/22/2000    PAP AKA CERVICAL CYTOLOGY  08/22/2000    Influenza Age 5 to Adult  08/01/2017     Monthly goal:    10-15 lbs weight loss     NEED TO GET LABS DONE  Body Mass Index: Care Instructions  Your Care Instructions    Body mass index (BMI) can help you see if your weight is raising your risk for health problems. It uses a formula to compare how much you weigh with how tall you are. · A BMI lower than 18.5 is considered underweight. · A BMI between 18.5 and 24.9 is considered healthy. · A BMI between 25 and 29.9 is considered overweight. A BMI of 30 or higher is considered obese. If your BMI is in the normal range, it means that you have a lower risk for weight-related health problems. If your BMI is in the overweight or obese range, you may be at increased risk for weight-related health problems, such as high blood pressure, heart disease, stroke, arthritis or joint pain, and diabetes. If your BMI is in the underweight range, you may be at increased risk for health problems such as fatigue, lower protection (immunity) against illness, muscle loss, bone loss, hair loss, and hormone problems. BMI is just one measure of your risk for weight-related health problems. You may be at higher risk for health problems if you are not active, you eat an unhealthy diet, or you drink too much alcohol or use tobacco products. Follow-up care is a key part of your treatment and safety. Be sure to make and go to all appointments, and call your doctor if you are having problems. It's also a good idea to know your test results and keep a list of the medicines you take. How can you care for yourself at home? · Practice healthy eating habits. This includes eating plenty of fruits, vegetables, whole grains, lean protein, and low-fat dairy. · If your doctor recommends it, get more exercise. Walking is a good choice.  Bit by bit, increase the amount you walk every day. Try for at least 30 minutes on most days of the week. · Do not smoke. Smoking can increase your risk for health problems. If you need help quitting, talk to your doctor about stop-smoking programs and medicines. These can increase your chances of quitting for good. · Limit alcohol to 2 drinks a day for men and 1 drink a day for women. Too much alcohol can cause health problems. If you have a BMI higher than 25  · Your doctor may do other tests to check your risk for weight-related health problems. This may include measuring the distance around your waist. A waist measurement of more than 40 inches in men or 35 inches in women can increase the risk of weight-related health problems. · Talk with your doctor about steps you can take to stay healthy or improve your health. You may need to make lifestyle changes to lose weight and stay healthy, such as changing your diet and getting regular exercise. If you have a BMI lower than 18.5  · Your doctor may do other tests to check your risk for health problems. · Talk with your doctor about steps you can take to stay healthy or improve your health. You may need to make lifestyle changes to gain or maintain weight and stay healthy, such as getting more healthy foods in your diet and doing exercises to build muscle. Where can you learn more? Go to http://shirley-pawan.info/. Enter S176 in the search box to learn more about \"Body Mass Index: Care Instructions. \"  Current as of: October 13, 2016  Content Version: 11.4  © 7996-6044 [employer]. Care instructions adapted under license by [email or web address] (which disclaims liability or warranty for this information). If you have questions about a medical condition or this instruction, always ask your healthcare professional. [place] [address] any warranty or liability for your use of this information.

## 2018-03-15 NOTE — MR AVS SNAPSHOT
[facility] 
 
 
 [address], [address] [facility] 
[phone] Patient: Julius Sultana 
MRN: [id number] :1980 Visit Information Date & Time Provider Department Dept. Phone Encounter #  
 3/15/2018  9:45 AM Nydia Sellers NP [facility] [phone] Follow-up Instructions Return in about 4 weeks (around 2018). Follow-up and Disposition History Your Appointments 3/15/2018 10:55 AM  
LAB with [facility] NURSE VISIT [facility] ([facility]) Appt Note: weightloss labs [address], [address] Chante Galindo [address]  
  
   
 [address], [address]  
  
    
 2018  6:51 AM  
METABOLIC PROGRAM 15 with Nydia Sellers [facility] ([facility]) Appt Note: 4 week weight loss [address], [facility] Chante Galindo [address]  
  
   
 [address], [address] Upcoming Health Maintenance Date Due DTaP/Tdap/Td series (1 - Tdap) 2000 PAP AKA CERVICAL CYTOLOGY 2000 Influenza Age 5 to Adult 2017 Allergies as of 3/15/2018  Review Complete On: 3/15/2018 By: Nydia Sellers NP Severity Noted Reaction Type Reactions Sulfa (Sulfonamide Antibiotics)  04/15/2015    Rash Current Immunizations  Never Reviewed No immunizations on file. Not reviewed this visit You Were Diagnosed With   
  
 Codes Comments Obesity (BMI 30-39.9)    -  Primary ICD-10-CM: T76.9 ICD-9-CM: 278.00 Fatty liver disease, nonalcoholic     ZBA-82-RE: U15.3 ICD-9-CM: 571.8 Vitals BP Pulse Temp Resp Height(growth percentile) Weight(growth percentile) 110/71 (BP 1 Location: Right arm, BP Patient Position: Sitting) 82 98.5 °F (36.9 °C) (Oral) 14 5' 5\" (1.651 m) 207 lb 12.8 oz (94.3 kg) SpO2 BMI OB Status Smoking Status 100% 34.58 kg/m2 Polycystic Ovarian Syndrome Never Smoker BMI and BSA Data Body Mass Index Body Surface Area 34.58 kg/m 2 2.08 m 2 Preferred Pharmacy Pharmacy Name Phone [facility] FRANCHESCA Thao , [address] [place] [address] Rd [phone] Your Updated Medication List  
  
   
This list is accurate as of 3/15/18 10:17 AM.  Always use your most recent med list.  
  
  
  
  
 Jl [facility] 250-50 mcg/dose diskus inhaler Generic drug:  fluticasone-salmeterol Take 1 Puff by inhalation every twelve (12) hours as needed. albuterol 2.5 mg /3 mL (0.083 %) nebulizer solution Commonly known as:  PROVENTIL VENTOLIN  
by Nebulization route once. Indications: ACUTE ASTHMA ATTACK Biotin-Silicon Diox-L-Cysteine 5,000 mcg -100 mg-50 mg Tab Take 5,000 mcg by mouth two (2) times a day. cholecalciferol (VITAMIN D3) 5,000 unit Tab tablet Commonly known as:  VITAMIN D3 Take 5,000 Units by mouth daily. levothyroxine 137 mcg tablet Commonly known as:  SYNTHROID Take 137 mcg by mouth Daily (before breakfast). MIRALAX PO Take  by mouth every other day. phentermine 15 mg capsule Commonly known as:  ADIPEX_P  
TAKE 1 CAPSULE BY MOUTH ONCE DAILY. MAXIMUM DAILY AMOUNT OF 1 CAPSULE A DAY PREMPRO 0.625-2.5 mg per tablet Generic drug:  estrogen (conjugated)-medroxyPROGESTERone Take 1 Tab by mouth daily. topiramate 25 mg tablet Commonly known as:  TOPAMAX Take 1 Tab by mouth two (2) times a day. Prescriptions Printed Refills  
 phentermine (ADIPEX_P) 15 mg capsule 0 Sig: TAKE 1 CAPSULE BY MOUTH ONCE DAILY. MAXIMUM DAILY AMOUNT OF 1 CAPSULE A DAY Class: Print Follow-up Instructions Return in about 4 weeks (around 4/12/2018). Patient Instructions Health Maintenance Due Topic Date Due  
  DTaP/Tdap/Td series (1 - Tdap) 08/22/2000  PAP AKA CERVICAL CYTOLOGY  08/22/2000  Influenza Age 5 to Adult  08/01/2017 Monthly goal: 
 
10-15 lbs weight loss NEED TO GET LABS DONE Body Mass Index: Care Instructions Your Care Instructions Body mass index (BMI) can help you see if your weight is raising your risk for health problems. It uses a formula to compare how much you weigh with how tall you are. · A BMI lower than 18.5 is considered underweight. · A BMI between 18.5 and 24.9 is considered healthy. · A BMI between 25 and 29.9 is considered overweight. A BMI of 30 or higher is considered obese. If your BMI is in the normal range, it means that you have a lower risk for weight-related health problems. If your BMI is in the overweight or obese range, you may be at increased risk for weight-related health problems, such as high blood pressure, heart disease, stroke, arthritis or joint pain, and diabetes. If your BMI is in the underweight range, you may be at increased risk for health problems such as fatigue, lower protection (immunity) against illness, muscle loss, bone loss, hair loss, and hormone problems. BMI is just one measure of your risk for weight-related health problems. You may be at higher risk for health problems if you are not active, you eat an unhealthy diet, or you drink too much alcohol or use tobacco products. Follow-up care is a key part of your treatment and safety. Be sure to make and go to all appointments, and call your doctor if you are having problems. It's also a good idea to know your test results and keep a list of the medicines you take. How can you care for yourself at home? · Practice healthy eating habits. This includes eating plenty of fruits, vegetables, whole grains, lean protein, and low-fat dairy. · If your doctor recommends it, get more exercise. Walking is a good choice. Bit by bit, increase the amount you walk every day.  Try for at least 30 minutes on most days of the week. · Do not smoke. Smoking can increase your risk for health problems. If you need help quitting, talk to your doctor about stop-smoking programs and medicines. These can increase your chances of quitting for good. · Limit alcohol to 2 drinks a day for men and 1 drink a day for women. Too much alcohol can cause health problems. If you have a BMI higher than 25 · Your doctor may do other tests to check your risk for weight-related health problems. This may include measuring the distance around your waist. A waist measurement of more than 40 inches in men or 35 inches in women can increase the risk of weight-related health problems. · Talk with your doctor about steps you can take to stay healthy or improve your health. You may need to make lifestyle changes to lose weight and stay healthy, such as changing your diet and getting regular exercise. If you have a BMI lower than 18.5 · Your doctor may do other tests to check your risk for health problems. · Talk with your doctor about steps you can take to stay healthy or improve your health. You may need to make lifestyle changes to gain or maintain weight and stay healthy, such as getting more healthy foods in your diet and doing exercises to build muscle. Where can you learn more? Go to http://shirley-pawan.info/. Enter S176 in the search box to learn more about \"Body Mass Index: Care Instructions. \" Current as of: October 13, 2016 Content Version: 11.4 © 3232-5325 Healthwise, Incorporated. Care instructions adapted under license by [employer] (which disclaims liability or warranty for this information). If you have questions about a medical condition or this instruction, always ask your healthcare professional. [place] [address] any warranty or liability for your use of this information. Patient Instructions History Introducing [facility] HE[facility]!    
 Dear Vianca Parkinson: 
 Thank you for requesting a [employer] account. Our records indicate that you already have an active [employer] account. You can access your account anytime at [email or web address] Did you know that you can access your hospital and ER discharge instructions at any time in [employer]? You can also review all of your test results from your hospital stay or ER visit. Additional Information If you have questions, please visit the Frequently Asked Questions section of the [employer] website at [email or web address] Remember, [employer] is NOT to be used for urgent needs. For medical emergencies, dial 911. Now available from your iPhone and Android! Please provide this summary of care documentation to your next provider. Your primary care clinician is listed as Elisha Galicia. If you have any questions after today's visit, please call [phone].

## 2018-03-15 NOTE — PROGRESS NOTES
Chief Complaint   Patient presents with    Weight Management     1. Have you been to the ER, urgent care clinic since your last visit? Hospitalized since your last visit? No    2. Have you seen or consulted any other health care providers outside of the [address] [place] [address] since your last visit? Include any pap smears or colon screening.  No

## 2018-03-16 LAB
ALBUMIN SERPL-MCNC: 4.7 G/DL (ref 3.5–5.5)
ALBUMIN/GLOB SERPL: 1.7 {RATIO} (ref 1.2–2.2)
ALP SERPL-CCNC: 71 IU/L (ref 39–117)
ALT SERPL-CCNC: 24 IU/L (ref 0–32)
AST SERPL-CCNC: 17 IU/L (ref 0–40)
BILIRUB SERPL-MCNC: 0.4 MG/DL (ref 0–1.2)
BUN SERPL-MCNC: 19 MG/DL (ref 6–20)
BUN/CREAT SERPL: 26 (ref 9–23)
CALCIUM SERPL-MCNC: 9.6 MG/DL (ref 8.7–10.2)
CHLORIDE SERPL-SCNC: 102 MMOL/L (ref 96–106)
CO2 SERPL-SCNC: 23 MMOL/L (ref 18–29)
CREAT SERPL-MCNC: 0.74 MG/DL (ref 0.57–1)
GFR SERPLBLD CREATININE-BSD FMLA CKD-EPI: 103 ML/MIN/1.73
GFR SERPLBLD CREATININE-BSD FMLA CKD-EPI: 119 ML/MIN/1.73
GLOBULIN SER CALC-MCNC: 2.7 G/DL (ref 1.5–4.5)
GLUCOSE SERPL-MCNC: 87 MG/DL (ref 65–99)
POTASSIUM SERPL-SCNC: 3.8 MMOL/L (ref 3.5–5.2)
PROT SERPL-MCNC: 7.4 G/DL (ref 6–8.5)
SODIUM SERPL-SCNC: 142 MMOL/L (ref 134–144)
URATE SERPL-MCNC: 3.3 MG/DL (ref 2.5–7.1)

## 2018-03-21 ENCOUNTER — CLINICAL SUPPORT (OUTPATIENT)
Dept: FAMILY MEDICINE CLINIC | Age: 38
End: 2018-03-21

## 2018-03-21 VITALS
HEIGHT: 65 IN | BODY MASS INDEX: 34.85 KG/M2 | WEIGHT: 209.2 LBS | HEART RATE: 94 BPM | SYSTOLIC BLOOD PRESSURE: 118 MMHG | DIASTOLIC BLOOD PRESSURE: 81 MMHG

## 2018-03-21 DIAGNOSIS — E66.9 OBESITY (BMI 30-39.9): Primary | ICD-10-CM

## 2018-03-21 NOTE — PROGRESS NOTES
Progress Note: Weekly Education Class in the [facility] Weight Loss Program   Is there anything that you or the patient needs to let the [facility] Physician know about? no  Over the past week, have you experienced any side-effects? no    Wagner Hodges is a 44 y.o. female who is enrolled in [facility] Weight Loss Program    Visit Vitals    /81 (BP 1 Location: Right arm, BP Patient Position: Sitting)    Pulse 94    Ht 5' 5\" (1.651 m)    Wt 209 lb 3.2 oz (94.9 kg)    BMI 34.81 kg/m2     Weight Metrics 3/21/2018 3/15/2018 3/15/2018 3/8/2018 3/7/2018 3/1/2018 2/28/2018   Weight 209 lb 3.2 oz - 207 lb 12.8 oz - 210 lb - 208 lb 9.6 oz   Waist Measure Inches 35 36 - 36 - 35 -   Exercise Mins/week - 120 - - - - -   Body Fat % - 39.1 - - - - -   BMI 34.81 kg/m2 - 34.58 kg/m2 - 34.95 kg/m2 - 34.71 kg/m2         Have you received any other medical care this week? no  If yes, where and for what? Have you had any change in your medications since your last visit? no  If yes what? Did you have any problems adhering to the program last week? no  If yes, please explain:       Eating Habits Over Last Week:  Did you take in 64 oz of non-caloric fluids? yes     Did you consume your 4 meal replacements each day?  yes       Physical Activity Over the Past Week:    Aerobic exercise: 120 min  Resistance exercise: 120 min workouts / week

## 2018-03-28 ENCOUNTER — CLINICAL SUPPORT (OUTPATIENT)
Dept: FAMILY MEDICINE CLINIC | Age: 38
End: 2018-03-28

## 2018-03-28 DIAGNOSIS — E66.9 OBESITY (BMI 30-39.9): Primary | ICD-10-CM

## 2018-03-29 VITALS
HEIGHT: 65 IN | BODY MASS INDEX: 34.55 KG/M2 | DIASTOLIC BLOOD PRESSURE: 75 MMHG | WEIGHT: 207.4 LBS | SYSTOLIC BLOOD PRESSURE: 116 MMHG | HEART RATE: 88 BPM

## 2018-03-29 NOTE — PROGRESS NOTES
Progress Note: Weekly Education Class in the [facility] Weight Loss Program   Is there anything that you or the patient needs to let the [address] [facility] Physician know about? no  Over the past week, have you experienced any side-effects? no    Julius Sultana is a 44 y.o. female who is enrolled in [facility] Weight Loss Program    Visit Vitals    /75 (BP 1 Location: Right arm, BP Patient Position: Sitting)    Pulse 88    Ht 5' 5\" (1.651 m)    Wt 207 lb 6.4 oz (94.1 kg)    BMI 34.51 kg/m2     Weight Metrics 3/29/2018 3/28/2018 3/21/2018 3/15/2018 3/15/2018 3/8/2018 3/7/2018   Weight - 207 lb 6.4 oz 209 lb 3.2 oz - 207 lb 12.8 oz - 210 lb   Waist Measure Inches 35 - 35 36 - 36 -   Exercise Mins/week - - - 120 - - -   Body Fat % - - - 39.1 - - -   BMI - 34.51 kg/m2 34.81 kg/m2 - 34.58 kg/m2 - 34.95 kg/m2         Have you received any other medical care this week? no  If yes, where and for what? Have you had any change in your medications since your last visit? no  If yes what? Did you have any problems adhering to the program last week? no  If yes, please explain:       Eating Habits Over Last Week:  Did you take in 64 oz of non-caloric fluids? yes     Did you consume your 4 meal replacements each day?  yes       Physical Activity Over the Past Week:    Aerobic exercise: 90 min  Resistance exercise: 90 min workouts / week

## 2018-04-04 ENCOUNTER — CLINICAL SUPPORT (OUTPATIENT)
Dept: FAMILY MEDICINE CLINIC | Age: 38
End: 2018-04-04

## 2018-04-04 VITALS
WEIGHT: 209.2 LBS | HEART RATE: 80 BPM | DIASTOLIC BLOOD PRESSURE: 76 MMHG | HEIGHT: 65 IN | SYSTOLIC BLOOD PRESSURE: 116 MMHG | BODY MASS INDEX: 34.85 KG/M2

## 2018-04-04 DIAGNOSIS — E66.9 OBESITY (BMI 30-39.9): Primary | ICD-10-CM

## 2018-04-04 NOTE — PROGRESS NOTES
Progress Note: Weekly Education Class in the [facility] Weight Loss Program   Is there anything that you or the patient needs to let the [facility] Phys[facility] know about? no  Over the past week, have you experienced any side-effects? no    Timo Gloria is a 44 y.o. female who is enrolled in [facility] Weight Loss Program    Visit Vitals    /76 (BP 1 Location: Right arm, BP Patient Position: Sitting)    Pulse 80    Ht 5' 5\" (1.651 m)    Wt 209 lb 3.2 oz (94.9 kg)    BMI 34.81 kg/m2     Weight Metrics 4/4/2018 3/29/2018 3/28/2018 3/21/2018 3/15/2018 3/15/2018 3/8/2018   Weight 209 lb 3.2 oz - 207 lb 6.4 oz 209 lb 3.2 oz - 207 lb 12.8 oz -   Waist Measure Inches 36 35 - 35 36 - 36   Exercise Mins/week - - - - 120 - -   Body Fat % - - - - 39.1 - -   BMI 34.81 kg/m2 - 34.51 kg/m2 34.81 kg/m2 - 34.58 kg/m2 -         Have you received any other medical care this week? no  If yes, where and for what? Have you had any change in your medications since your last visit? no  If yes what? Did you have any problems adhering to the program last week? no  If yes, please explain:       Eating Habits Over Last Week:  Did you take in 64 oz of non-caloric fluids? yes     Did you consume your 4 meal replacements each day?  yes       Physical Activity Over the Past Week:    Aerobic exercise: 120 min  Resistance exercise: 120 min workouts / week

## 2018-04-12 ENCOUNTER — OFFICE VISIT (OUTPATIENT)
Dept: INTERNAL MEDICINE CLINIC | Age: 38
End: 2018-04-12

## 2018-04-12 VITALS
SYSTOLIC BLOOD PRESSURE: 116 MMHG | HEIGHT: 65 IN | TEMPERATURE: 97.2 F | BODY MASS INDEX: 34.62 KG/M2 | HEART RATE: 82 BPM | OXYGEN SATURATION: 100 % | RESPIRATION RATE: 14 BRPM | WEIGHT: 207.8 LBS | DIASTOLIC BLOOD PRESSURE: 73 MMHG

## 2018-04-12 DIAGNOSIS — E66.9 OBESITY (BMI 30-39.9): Primary | ICD-10-CM

## 2018-04-12 DIAGNOSIS — E03.9 ACQUIRED HYPOTHYROIDISM: ICD-10-CM

## 2018-04-12 DIAGNOSIS — K76.0 FATTY LIVER DISEASE, NONALCOHOLIC: ICD-10-CM

## 2018-04-12 RX ORDER — PHENTERMINE HYDROCHLORIDE 37.5 MG/1
37.5 TABLET ORAL
Qty: 30 TAB | Refills: 0 | Status: SHIPPED | OUTPATIENT
Start: 2018-04-12 | End: 2018-05-10 | Stop reason: SDUPTHER

## 2018-04-12 NOTE — PROGRESS NOTES
Chief Complaint   Patient presents with    Weight Management     1. Have you been to the ER, urgent care clinic since your last visit? Hospitalized since your last visit? No    2. Have you seen or consulted any other health care providers outside of the [address] [place] [address] since your last visit? Include any pap smears or colon screening.  No

## 2018-04-12 NOTE — PATIENT INSTRUCTIONS
Health Maintenance Due   Topic Date Due    DTaP/Tdap/Td series (1 - Tdap) 08/22/2000    PAP AKA CERVICAL CYTOLOGY  08/22/2000    Influenza Age 5 to Adult  08/01/2017     Monthly goal:    Nothing but product for 1 month. Think about counseling, if still struggling with getting to goal will re-examine. Body Mass Index: Care Instructions  Your Care Instructions    Body mass index (BMI) can help you see if your weight is raising your risk for health problems. It uses a formula to compare how much you weigh with how tall you are. · A BMI lower than 18.5 is considered underweight. · A BMI between 18.5 and 24.9 is considered healthy. · A BMI between 25 and 29.9 is considered overweight. A BMI of 30 or higher is considered obese. If your BMI is in the normal range, it means that you have a lower risk for weight-related health problems. If your BMI is in the overweight or obese range, you may be at increased risk for weight-related health problems, such as high blood pressure, heart disease, stroke, arthritis or joint pain, and diabetes. If your BMI is in the underweight range, you may be at increased risk for health problems such as fatigue, lower protection (immunity) against illness, muscle loss, bone loss, hair loss, and hormone problems. BMI is just one measure of your risk for weight-related health problems. You may be at higher risk for health problems if you are not active, you eat an unhealthy diet, or you drink too much alcohol or use tobacco products. Follow-up care is a key part of your treatment and safety. Be sure to make and go to all appointments, and call your doctor if you are having problems. It's also a good idea to know your test results and keep a list of the medicines you take. How can you care for yourself at home? · Practice healthy eating habits. This includes eating plenty of fruits, vegetables, whole grains, lean protein, and low-fat dairy.   · If your doctor recommends it, get more exercise. Walking is a good choice. Bit by bit, increase the amount you walk every day. Try for at least 30 minutes on most days of the week. · Do not smoke. Smoking can increase your risk for health problems. If you need help quitting, talk to your doctor about stop-smoking programs and medicines. These can increase your chances of quitting for good. · Limit alcohol to 2 drinks a day for men and 1 drink a day for women. Too much alcohol can cause health problems. If you have a BMI higher than 25  · Your doctor may do other tests to check your risk for weight-related health problems. This may include measuring the distance around your waist. A waist measurement of more than 40 inches in men or 35 inches in women can increase the risk of weight-related health problems. · Talk with your doctor about steps you can take to stay healthy or improve your health. You may need to make lifestyle changes to lose weight and stay healthy, such as changing your diet and getting regular exercise. If you have a BMI lower than 18.5  · Your doctor may do other tests to check your risk for health problems. · Talk with your doctor about steps you can take to stay healthy or improve your health. You may need to make lifestyle changes to gain or maintain weight and stay healthy, such as getting more healthy foods in your diet and doing exercises to build muscle. Where can you learn more? Go to http://shirley-pawan.info/. Enter S176 in the search box to learn more about \"Body Mass Index: Care Instructions. \"  Current as of: October 13, 2016  Content Version: 11.4  © 0629-3963 Healthwise, Incorporated. Care instructions adapted under license by [employer] (which disclaims liability or warranty for this information).  If you have questions about a medical condition or this instruction, always ask your healthcare professional. [place] [address] any warranty or liability for your use of this information.

## 2018-04-12 NOTE — MR AVS SNAPSHOT
Edgardo Quinones 
 
 
 [address], [address] [facility] 
[phone] Patient: Gagandeep Tirado 
MRN: [id number] :[id number] Visit Information Date & Time Provider Department Dept. Phone Encounter #  
 2018  9:45 AM Lazarus Blind, NP [facility] [id number] Follow-up Instructions Return in about 4 weeks (around 5/10/2018). Your Appointments 5/10/2018  3:62 AM  
METABOLIC PROGRAM 15 with Lazarus Blind, NP [facility] ([facility]) Appt Note: 4 week f/u  
 [address], [address] [facility] [address]  
  
   
 [address], [address] Upcoming Health Maintenance Date Due DTaP/Tdap/Td series (1 - Tdap) 2000 PAP AKA CERVICAL CYTOLOGY 2000 Influenza Age 5 to Adult 2017 Allergies as of 2018  Review Complete On: 2018 By: Lazarus Blind, NP Severity Noted Reaction Type Reactions Sulfa (Sulfonamide Antibiotics)  04/15/2015    Rash Current Immunizations  Never Reviewed No immunizations on file. Not reviewed this visit You Were Diagnosed With   
  
 Codes Comments Obesity (BMI 30-39.9)    -  Primary ICD-10-CM: T04.1 ICD-9-CM: 278.00 Acquired hypothyroidism     ICD-10-CM: E03.9 ICD-9-CM: 244.9 Fatty liver disease, nonalcoholic     MPE-84-IN: Q54.5 ICD-9-CM: 571.8 Vitals BP Pulse Temp Resp Height(growth percentile) Weight(growth percentile) 116/73 (BP 1 Location: Left arm, BP Patient Position: Sitting) 82 97.2 °F (36.2 °C) (Oral) 14 5' 5\" (1.651 m) 207 lb 12.8 oz (94.3 kg) SpO2 BMI OB Status Smoking Status 100% 34.58 kg/m2 Polycystic Ovarian Syndrome Never Smoker Vitals History BMI and BSA Data Body Mass Index Body Surface Area 34.58 kg/m 2 2.08 m 2 Preferred Pharmacy Pharmacy Name Phone [id number] [address] [facility], [address] [phone] Your Updated Medication List  
  
   
This list is accurate as of 4/12/18 10:34 AM.  Always use your most recent med list.  
  
  
  
  
 Paco Jayesh 250-50 mcg/dose diskus inhaler Generic drug:  fluticasone-salmeterol Take 1 Puff by inhalation every twelve (12) hours as needed. albuterol 2.5 mg /3 mL (0.083 %) nebulizer solution Commonly known as:  PROVENTIL VENTOLIN  
by Nebulization route once. Indications: ACUTE ASTHMA ATTACK Biotin-Silicon Diox-L-Cysteine 5,000 mcg -100 mg-50 mg Tab Take 5,000 mcg by mouth two (2) times a day. cholecalciferol (VITAMIN D3) 5,000 unit Tab tablet Commonly known as:  VITAMIN D3 Take 5,000 Units by mouth daily. levothyroxine 137 mcg tablet Commonly known as:  SYNTHROID Take 137 mcg by mouth Daily (before breakfast). MIRALAX PO Take  by mouth every other day. phentermine 37.5 mg tablet Commonly known as:  ADIPEX-P Take 1 Tab by mouth every morning. Max Daily Amount: 37.5 mg. PREMPRO 0.625-2.5 mg per tablet Generic drug:  estrogen (conjugated)-medroxyPROGESTERone Take 1 Tab by mouth daily. topiramate 25 mg tablet Commonly known as:  TOPAMAX Take 1 Tab by mouth two (2) times a day. Prescriptions Printed Refills  
 phentermine (ADIPEX-P) 37.5 mg tablet 0 Sig: Take 1 Tab by mouth every morning. Max Daily Amount: 37.5 mg.  
 Class: Print Route: Oral  
  
Follow-up Instructions Return in about 4 weeks (around 5/10/2018). To-Do List   
 04/12/2018 Lab:  METABOLIC PANEL, COMPREHENSIVE   
  
 04/12/2018 Lab:  URIC ACID Patient Instructions Health Maintenance Due Topic Date Due  
 DTaP/Tdap/Td series (1 - Tdap) 08/22/2000  PAP AKA CERVICAL CYTOLOGY  08/22/2000  Influenza Age 5 to Adult  08/01/2017 Monthly goal: 
 
Nothing but product for 1 month. Think about counseling, if still struggling with getting to goal will re-examine. Body Mass Index: Care Instructions Your Care Instructions Body mass index (BMI) can help you see if your weight is raising your risk for health problems. It uses a formula to compare how much you weigh with how tall you are. · A BMI lower than 18.5 is considered underweight. · A BMI between 18.5 and 24.9 is considered healthy. · A BMI between 25 and 29.9 is considered overweight. A BMI of 30 or higher is considered obese. If your BMI is in the normal range, it means that you have a lower risk for weight-related health problems. If your BMI is in the overweight or obese range, you may be at increased risk for weight-related health problems, such as high blood pressure, heart disease, stroke, arthritis or joint pain, and diabetes. If your BMI is in the underweight range, you may be at increased risk for health problems such as fatigue, lower protection (immunity) against illness, muscle loss, bone loss, hair loss, and hormone problems. BMI is just one measure of your risk for weight-related health problems. You may be at higher risk for health problems if you are not active, you eat an unhealthy diet, or you drink too much alcohol or use tobacco products. Follow-up care is a key part of your treatment and safety. Be sure to make and go to all appointments, and call your doctor if you are having problems. It's also a good idea to know your test results and keep a list of the medicines you take. How can you care for yourself at home? · Practice healthy eating habits. This includes eating plenty of fruits, vegetables, whole grains, lean protein, and low-fat dairy. · If your doctor recommends it, get more exercise. Walking is a good choice. Bit by bit, increase the amount you walk every day. Try for at least 30 minutes on most days of the week. · Do not smoke. Smoking can increase your risk for health problems.  If you need help quitting, talk to your doctor about stop-smoking programs and medicines. These can increase your chances of quitting for good. · Limit alcohol to 2 drinks a day for men and 1 drink a day for women. Too much alcohol can cause health problems. If you have a BMI higher than 25 · Your doctor may do other tests to check your risk for weight-related health problems. This may include measuring the distance around your waist. A waist measurement of more than 40 inches in men or 35 inches in women can increase the risk of weight-related health problems. · Talk with your doctor about steps you can take to stay healthy or improve your health. You may need to make lifestyle changes to lose weight and stay healthy, such as changing your diet and getting regular exercise. If you have a BMI lower than 18.5 · Your doctor may do other tests to check your risk for health problems. · Talk with your doctor about steps you can take to stay healthy or improve your health. You may need to make lifestyle changes to gain or maintain weight and stay healthy, such as getting more healthy foods in your diet and doing exercises to build muscle. Where can you learn more? Go to http://shirley-pawan.info/. Enter S176 in the search box to learn more about \"Body Mass Index: Care Instructions. \" Current as of: October 13, 2016 Content Version: 11.4 © 0967-8642 Healthwise, Incorporated. Care instructions adapted under license by [employer] (which disclaims liability or warranty for this information). If you have questions about a medical condition or this instruction, always ask your healthcare professional. [place] [address] any warranty or liability for your use of this information. Introducing hospitals & HEALTH SERVICES! Dear Duarte De Luna: Thank you for requesting a AptDec[employer] account. Our records indicate that you already have an active Apt[employer] account.   You can access your account anytime at [email or web address] Did you know that you can access your hospital and ER discharge instructions at any time in KangaDo? You can also review all of your test results from your hospital stay or ER visit. Additional Information If you have questions, please visit the Frequently Asked Questions section of the KangaDo website at [email or web address] Remember, KangaDo is NOT to be used for urgent needs. For medical emergencies, dial 911. Now available from your iPhone and Android! Please provide this summary of care documentation to your next provider. Your primary care clinician is listed as Fredricka Burkitt. If you have any questions after today's visit, please call [phone].

## 2018-04-12 NOTE — PROGRESS NOTES
[facility] Progress Note:   F/up Physician Visit    CC: Obesity      Wilma Velasquez is a 44 y.o. female who is here for her  f/up physician visit for the VLCD Program. Lacey Leyden has completed 65 weeks of the program to date. 4 lbs weight loss, long discussion with pt about goal weight and maintenance. Pt realizes she is using program as maintenance currently. Will try nothing but product x 1 month and if successful, may do the same for 1 more month and will be at goal.  If no weight loss or not significant will readdress counseling as option to help. Starting weight: 4/15/15 at 298 lbs  Current weight: 207 lbs  Goal weight: 185 lbs      Most recent EKG: 10/14/15 at 194lbs    Weight Metrics 4/12/2018 4/12/2018 4/4/2018 3/29/2018 3/28/2018 3/21/2018 3/15/2018   Weight - 207 lb 12.8 oz 209 lb 3.2 oz - 207 lb 6.4 oz 209 lb 3.2 oz -   Waist Measure Inches 36 - 36 35 - 35 36   Exercise Mins/week 120 - - - - - 120   Body Fat % 38.9 - - - - - 39.1   BMI - 34.58 kg/m2 34.81 kg/m2 - 34.51 kg/m2 34.81 kg/m2 -         Current Outpatient Prescriptions   Medication Sig Dispense Refill    phentermine (ADIPEX_P) 15 mg capsule TAKE 1 CAPSULE BY MOUTH ONCE DAILY. MAXIMUM DAILY AMOUNT OF 1 CAPSULE A DAY 30 Cap 0    topiramate (TOPAMAX) 25 mg tablet Take 1 Tab by mouth two (2) times a day. 60 Tab 2    cholecalciferol, VITAMIN D3, (VITAMIN D3) 5,000 unit tab tablet Take 5,000 Units by mouth daily.  POLYETHYLENE GLYCOL 3350 (MIRALAX PO) Take  by mouth every other day.  levothyroxine (SYNTHROID) 137 mcg tablet Take 137 mcg by mouth Daily (before breakfast).  Biotin-Silicon Diox-L-Cysteine 5,000 mcg-100 mg- 50 mg tab Take 5,000 mcg by mouth two (2) times a day.  fluticasone-salmeterol (ADVAIR DISKUS) 250-50 mcg/dose diskus inhaler Take 1 Puff by inhalation every twelve (12) hours as needed.       albuterol (PROVENTIL VENTOLIN) 2.5 mg /3 mL (0.083 %) nebulizer solution by Nebulization route once. Indications: ACUTE ASTHMA ATTACK      estrogen, conjugated,-medroxyPROGESTERone (PREMPRO) 0.625-2.5 mg per tablet Take 1 Tab by mouth daily. Participation   Did you attend clinic and class last week? yes    Review of Systems  Since your last visit, have you experienced any complications? no  If yes, please list:     No CP, SOB, palpitations, lightheadedness, dizziness, constipation. Positives highlight in BOLD. Are you taking an appetite suppressant? yes  If so, is there any Chest Pain, Palpitations or Dizziness? no  BP Readings from Last 10 Encounters:   04/12/18 116/73   04/04/18 116/76   03/29/18 116/75   03/21/18 118/81   03/15/18 110/71   03/08/18 113/77   03/01/18 110/76   02/21/18 117/78   02/15/18 110/76   02/15/18 118/77         Have you received any other medical care this week? no  If yes, where and for what? Have you discontinued or changed any medicine or dose of your medicine since your last visit? no  If yes, where and for what? Diet  How many ounces of calorie-free liquids did you consume each day?  100 oz    How many meal replacements did you take each day? 4    Did you have any problems adhering to the program?  no If yes, please explain:       Exercise  Aerobic exercise: 120 min  Resistance exercise: 120 min workouts / week  Any discomfort?  no     If yes, where? Review of Systems  Complete ROS negative except where noted above    Objective  Visit Vitals    /73 (BP 1 Location: Left arm, BP Patient Position: Sitting)    Pulse 82    Temp 97.2 °F (36.2 °C) (Oral)    Resp 14    Ht 5' 5\" (1.651 m)    Wt 207 lb 12.8 oz (94.3 kg)    SpO2 100%    BMI 34.58 kg/m2     No LMP recorded. Patient is not currently having periods (Reason: Polycystic Ovarian Syndrome).     PHQ over the last two weeks 1/4/2017   Little interest or pleasure in doing things Not at all   Feeling down, depressed or hopeless Not at all   Total Score PHQ 2 0 Waist Circumference: I personally reviewed patient's Weight Management Doc Flowsheet  Neck Circumference: I personally reviewed patient's Weight Management Doc Flowsheet  Percent Body Fat: I personally reviewed patient's Weight Management Doc Flowsheet    Physical Exam  Appearance: well appearing, obese, A&O, NAD  HEENT:  NC/AT, PERRL, No scleral icterus  Heart:  RRR without M/R/G  Lungs:  CTAB, no rhonchi, rales, or wheezes with good air exchange   Ext:  No LE Edema    Assessment / Plan    Encounter Diagnoses   Name Primary?  Obesity (BMI 30-39. 9) Yes    Acquired hypothyroidism     Fatty liver disease, nonalcoholic        1. Weight management borderline controlled   Progress was reviewed with patient    2. Labs    Latest results reviewed with patient   Lab slip given to pt for f/up HDL labs    3. Diet regimen   # of meal replacements prescribed: 4   If modified LCD-nutritional guidelines:    Monthly Goal   As below    Medical monitoring schedule:   Weekly BP/Weight checks   Monthly provider appointments  I have reviewed/discussed the above normal BMI with the patient. I have recommended the following interventions: dietary management education, guidance, and counseling, monitor weight and prescribed dietary intake . .      Ms. Emily Dobbins has a reminder for a \"due or due soon\" health maintenance. I have asked that she contact her primary care provider for follow-up on this health maintenance. Patient Instructions     Health Maintenance Due   Topic Date Due    DTaP/Tdap/Td series (1 - Tdap) 08/22/2000    PAP AKA CERVICAL CYTOLOGY  08/22/2000    Influenza Age 5 to Adult  08/01/2017     Monthly goal:    Nothing but product for 1 month. Think about counseling, if still struggling with getting to goal will re-examine. Body Mass Index: Care Instructions  Your Care Instructions    Body mass index (BMI) can help you see if your weight is raising your risk for health problems.  It uses a formula to compare how much you weigh with how tall you are. · A BMI lower than 18.5 is considered underweight. · A BMI between 18.5 and 24.9 is considered healthy. · A BMI between 25 and 29.9 is considered overweight. A BMI of 30 or higher is considered obese. If your BMI is in the normal range, it means that you have a lower risk for weight-related health problems. If your BMI is in the overweight or obese range, you may be at increased risk for weight-related health problems, such as high blood pressure, heart disease, stroke, arthritis or joint pain, and diabetes. If your BMI is in the underweight range, you may be at increased risk for health problems such as fatigue, lower protection (immunity) against illness, muscle loss, bone loss, hair loss, and hormone problems. BMI is just one measure of your risk for weight-related health problems. You may be at higher risk for health problems if you are not active, you eat an unhealthy diet, or you drink too much alcohol or use tobacco products. Follow-up care is a key part of your treatment and safety. Be sure to make and go to all appointments, and call your doctor if you are having problems. It's also a good idea to know your test results and keep a list of the medicines you take. How can you care for yourself at home? · Practice healthy eating habits. This includes eating plenty of fruits, vegetables, whole grains, lean protein, and low-fat dairy. · If your doctor recommends it, get more exercise. Walking is a good choice. Bit by bit, increase the amount you walk every day. Try for at least 30 minutes on most days of the week. · Do not smoke. Smoking can increase your risk for health problems. If you need help quitting, talk to your doctor about stop-smoking programs and medicines. These can increase your chances of quitting for good. · Limit alcohol to 2 drinks a day for men and 1 drink a day for women.  Too much alcohol can cause health problems. If you have a BMI higher than 25  · Your doctor may do other tests to check your risk for weight-related health problems. This may include measuring the distance around your waist. A waist measurement of more than 40 inches in men or 35 inches in women can increase the risk of weight-related health problems. · Talk with your doctor about steps you can take to stay healthy or improve your health. You may need to make lifestyle changes to lose weight and stay healthy, such as changing your diet and getting regular exercise. If you have a BMI lower than 18.5  · Your doctor may do other tests to check your risk for health problems. · Talk with your doctor about steps you can take to stay healthy or improve your health. You may need to make lifestyle changes to gain or maintain weight and stay healthy, such as getting more healthy foods in your diet and doing exercises to build muscle. Where can you learn more? Go to http://shirley-pawan.info/. Enter S176 in the search box to learn more about \"Body Mass Index: Care Instructions. \"  Current as of: October 13, 2016  Content Version: 11.4  © 1694-5932 [employer]. Care instructions adapted under license by [employer] (which disclaims liability or warranty for this information). If you have questions about a medical condition or this instruction, always ask your healthcare professional. [place] [address] any warranty or liability for your use of this information. Follow-up Disposition:  Return in about 4 weeks (around 5/10/2018). 10 minutes of the 15 minutes face to face time with Beto Castro consisted of counseling & coordinating and/or discussing treatment plans in reference to her The primary encounter diagnosis was Obesity (BMI 30-39.9). Diagnoses of Acquired hypothyroidism and Fatty liver disease, nonalcoholic were also pertinent to this visit.     The patient is to follow up as scheduled and will report to the ED or the office if symptoms change or increase. The patient has voiced understanding and will comply.

## 2018-04-18 ENCOUNTER — CLINICAL SUPPORT (OUTPATIENT)
Dept: FAMILY MEDICINE CLINIC | Age: 38
End: 2018-04-18

## 2018-04-18 VITALS
WEIGHT: 209.6 LBS | SYSTOLIC BLOOD PRESSURE: 123 MMHG | HEART RATE: 93 BPM | DIASTOLIC BLOOD PRESSURE: 78 MMHG | HEIGHT: 65 IN | BODY MASS INDEX: 34.92 KG/M2

## 2018-04-18 DIAGNOSIS — E66.9 OBESITY (BMI 30-39.9): Primary | ICD-10-CM

## 2018-04-18 NOTE — PROGRESS NOTES
Progress Note: Weekly Education Class in the [facility]   Is there anything that you or the patient needs to let the [address] [facility] Physician know about? no  Over the past week, have you experienced any side-effects? no    Pablo Barragan is a 44 y.o. female who is enrolled in [facility]    Visit Vitals    /78 (BP 1 Location: Right arm, BP Patient Position: Sitting)    Pulse 93    Ht 5' 5\" (1.651 m)    Wt 209 lb 9.6 oz (95.1 kg)    BMI 34.88 kg/m2     Weight Metrics 4/18/2018 4/12/2018 4/12/2018 4/4/2018 3/29/2018 3/28/2018 3/21/2018   Weight 209 lb 9.6 oz - 207 lb 12.8 oz 209 lb 3.2 oz - 207 lb 6.4 oz 209 lb 3.2 oz   Waist Measure Inches 36 36 - 36 35 - 35   Exercise Mins/week - 120 - - - - -   Body Fat % - 38.9 - - - - -   BMI 34.88 kg/m2 - 34.58 kg/m2 34.81 kg/m2 - 34.51 kg/m2 34.81 kg/m2         Have you received any other medical care this week? no  If yes, where and for what? Have you had any change in your medications since your last visit? no  If yes what? Did you have any problems adhering to the program last week? no  If yes, please explain:       Eating Habits Over Last Week:  Did you take in 64 oz of non-caloric fluids? yes     Did you consume your 4 meal replacements each day?  yes       Physical Activity Over the Past Week:    Aerobic exercise: 120 min  Resistance exercise: 120 min workouts / week

## 2018-04-25 ENCOUNTER — CLINICAL SUPPORT (OUTPATIENT)
Dept: FAMILY MEDICINE CLINIC | Age: 38
End: 2018-04-25

## 2018-04-25 VITALS
HEART RATE: 89 BPM | SYSTOLIC BLOOD PRESSURE: 114 MMHG | DIASTOLIC BLOOD PRESSURE: 79 MMHG | WEIGHT: 208 LBS | HEIGHT: 65 IN | BODY MASS INDEX: 34.66 KG/M2

## 2018-04-25 DIAGNOSIS — E66.9 OBESITY (BMI 30-39.9): Primary | ICD-10-CM

## 2018-04-25 NOTE — PROGRESS NOTES
Progress Note: Weekly Education Class in the [facility]   Is there anything that you or the patient needs to let the [facility] Physician know about? no  Over the past week, have you experienced any side-effects? no    Radhames Salinas is a 44 y.o. female who is enrolled in [facility]    Visit Vitals    /79 (BP 1 Location: Right arm, BP Patient Position: Sitting)    Pulse 89    Ht 5' 5\" (1.651 m)    Wt 208 lb (94.3 kg)    BMI 34.61 kg/m2     Weight Metrics 4/25/2018 4/18/2018 4/12/2018 4/12/2018 4/4/2018 3/29/2018 3/28/2018   Weight 208 lb 209 lb 9.6 oz - 207 lb 12.8 oz 209 lb 3.2 oz - 207 lb 6.4 oz   Waist Measure Inches 35 36 36 - 36 35 -   Exercise Mins/week - - 120 - - - -   Body Fat % - - 38.9 - - - -   BMI 34.61 kg/m2 34.88 kg/m2 - 34.58 kg/m2 34.81 kg/m2 - 34.51 kg/m2         Have you received any other medical care this week? no  If yes, where and for what? Have you had any change in your medications since your last visit? no  If yes what? Did you have any problems adhering to the program last week? no  If yes, please explain:       Eating Habits Over Last Week:  Did you take in 64 oz of non-caloric fluids? yes     Did you consume your 4 meal replacements each day?  yes       Physical Activity Over the Past Week:    Aerobic exercise: 120 min  Resistance exercise: 120 min workouts / week

## 2018-05-02 ENCOUNTER — CLINICAL SUPPORT (OUTPATIENT)
Dept: FAMILY MEDICINE CLINIC | Age: 38
End: 2018-05-02

## 2018-05-02 DIAGNOSIS — E66.9 OBESITY (BMI 30-39.9): Primary | ICD-10-CM

## 2018-05-03 VITALS
WEIGHT: 209.8 LBS | BODY MASS INDEX: 34.95 KG/M2 | HEIGHT: 65 IN | HEART RATE: 89 BPM | SYSTOLIC BLOOD PRESSURE: 114 MMHG | DIASTOLIC BLOOD PRESSURE: 76 MMHG

## 2018-05-03 NOTE — PROGRESS NOTES
Progress Note: Weekly Education Class in the [facility]   Is there anything that you or the patient needs to let the [facility] Physician know about? no  Over the past week, have you experienced any side-effects? no    Ernesto Ruiz is a 44 y.o. female who is enrolled in [facility]    Visit Vitals    /76 (BP 1 Location: Right arm, BP Patient Position: Sitting)    Pulse 89    Ht 5' 5\" (1.651 m)    Wt 209 lb 12.8 oz (95.2 kg)    BMI 34.91 kg/m2     Weight Metrics 5/3/2018 5/2/2018 4/25/2018 4/18/2018 4/12/2018 4/12/2018 4/4/2018   Weight - 209 lb 12.8 oz 208 lb 209 lb 9.6 oz - 207 lb 12.8 oz 209 lb 3.2 oz   Waist Measure Inches 37 - 35 36 36 - 36   Exercise Mins/week - - - - 120 - -   Body Fat % - - - - 38.9 - -   BMI - 34.91 kg/m2 34.61 kg/m2 34.88 kg/m2 - 34.58 kg/m2 34.81 kg/m2         Have you received any other medical care this week? no  If yes, where and for what? Have you had any change in your medications since your last visit? no  If yes what? Did you have any problems adhering to the program last week? no  If yes, please explain:       Eating Habits Over Last Week:  Did you take in 64 oz of non-caloric fluids? yes     Did you consume your 4 meal replacements each day?  yes       Physical Activity Over the Past Week:    Aerobic exercise: 120 min  Resistance exercise: 120 min workouts / week

## 2018-05-10 ENCOUNTER — HOSPITAL ENCOUNTER (OUTPATIENT)
Dept: LAB | Age: 38
Discharge: HOME | End: 2018-05-10

## 2018-05-10 ENCOUNTER — OFFICE VISIT (OUTPATIENT)
Dept: INTERNAL MEDICINE CLINIC | Age: 38
End: 2018-05-10

## 2018-05-10 VITALS
WEIGHT: 205.7 LBS | SYSTOLIC BLOOD PRESSURE: 107 MMHG | HEIGHT: 65 IN | TEMPERATURE: 97.5 F | OXYGEN SATURATION: 99 % | HEART RATE: 81 BPM | DIASTOLIC BLOOD PRESSURE: 70 MMHG | BODY MASS INDEX: 34.27 KG/M2 | RESPIRATION RATE: 14 BRPM

## 2018-05-10 DIAGNOSIS — Z13.220 SCREENING FOR LIPID DISORDERS: ICD-10-CM

## 2018-05-10 DIAGNOSIS — E66.9 OBESITY (BMI 30-39.9): Primary | ICD-10-CM

## 2018-05-10 DIAGNOSIS — E03.9 ACQUIRED HYPOTHYROIDISM: ICD-10-CM

## 2018-05-10 DIAGNOSIS — K76.0 FATTY LIVER DISEASE, NONALCOHOLIC: ICD-10-CM

## 2018-05-10 PROCEDURE — 99001 SPECIMEN HANDLING PT-LAB: CPT | Performed by: NURSE PRACTITIONER

## 2018-05-10 RX ORDER — PHENTERMINE HYDROCHLORIDE 15 MG/1
CAPSULE ORAL
Refills: 0 | COMMUNITY
Start: 2018-04-04 | End: 2018-05-10 | Stop reason: DRUGHIGH

## 2018-05-10 RX ORDER — PHENTERMINE HYDROCHLORIDE 37.5 MG/1
37.5 TABLET ORAL
Qty: 30 TAB | Refills: 0 | Status: SHIPPED | OUTPATIENT
Start: 2018-05-10 | End: 2018-07-18 | Stop reason: SDUPTHER

## 2018-05-10 NOTE — MR AVS SNAPSHOT
[address] [facility] 
 
 
 [address] [facility], [address] 
[phone] Patient: Gagandeep Tirado 
MRN: [id number] :1980 Visit Information Date & Time Provider Department Dept. Phone Encounter #  
 5/10/2018  9:45 AM Lazarus Blind, NP [facility] [id number] Follow-up Instructions Return in about 6 weeks (around 2018). Your Appointments 2018  6:42 AM  
METABOLIC PROGRAM 15 with Lazarus Blind, NP [facility] ([facility]) Appt Note: 6 week f/u  
 [address] [facility], [address] [place] [address]  
  
   
 [address], [address] Upcoming Health Maintenance Date Due DTaP/Tdap/Td series (1 - Tdap) 2000 PAP AKA CERVICAL CYTOLOGY 2000 Influenza Age 5 to Adult 2018 Allergies as of 5/10/2018  Review Complete On: 5/10/2018 By: Lazarus Blind, NP Severity Noted Reaction Type Reactions Sulfa (Sulfonamide Antibiotics)  04/15/2015    Rash Current Immunizations  Never Reviewed No immunizations on file. Not reviewed this visit You Were Diagnosed With   
  
 Codes Comments Obesity (BMI 30-39.9)    -  Primary ICD-10-CM: D56.4 ICD-9-CM: 278.00 Acquired hypothyroidism     ICD-10-CM: E03.9 ICD-9-CM: 244.9 Fatty liver disease, nonalcoholic     BNZ-69-XP: T05.0 ICD-9-CM: 571.8 Screening for lipid disorders     ICD-10-CM: Z13.220 ICD-9-CM: V77.91 Vitals BP Pulse Temp Resp Height(growth percentile) Weight(growth percentile) 107/70 (BP 1 Location: Left arm, BP Patient Position: Sitting) 81 97.5 °F (36.4 °C) (Oral) 14 5' 5\" (1.651 m) 205 lb 11.2 oz (93.3 kg) SpO2 BMI OB Status Smoking Status 99% 34.23 kg/m2 Polycystic Ovarian Syndrome Never Smoker BMI and BSA Data  Body Mass Index Body Surface Area  
 34.23 kg/m 2 2.07 m 2  
 Preferred Pharmacy Pharmacy Name Phone [id number] [address] [facility], [address] [place] [address] Rd [phone] Your Updated Medication List  
  
   
This list is accurate as of 5/10/18 10:23 AM.  Always use your most recent med list.  
  
  
  
  
 Jb Castillo 250-50 mcg/dose diskus inhaler Generic drug:  fluticasone-salmeterol Take 1 Puff by inhalation every twelve (12) hours as needed. albuterol 2.5 mg /3 mL (0.083 %) nebulizer solution Commonly known as:  PROVENTIL VENTOLIN  
by Nebulization route once. Indications: ACUTE ASTHMA ATTACK Biotin-Silicon Diox-L-Cysteine 5,000 mcg -100 mg-50 mg Tab Take 5,000 mcg by mouth two (2) times a day. cholecalciferol (VITAMIN D3) 5,000 unit Tab tablet Commonly known as:  VITAMIN D3 Take 5,000 Units by mouth daily. levothyroxine 137 mcg tablet Commonly known as:  SYNTHROID Take 137 mcg by mouth Daily (before breakfast). MIRALAX PO Take  by mouth every other day. phentermine 37.5 mg tablet Commonly known as:  ADIPEX-P Take 1 Tab by mouth every morning. Max Daily Amount: 37.5 mg. PREMPRO 0.625-2.5 mg per tablet Generic drug:  estrogen (conjugated)-medroxyPROGESTERone Take 1 Tab by mouth daily. topiramate 25 mg tablet Commonly known as:  TOPAMAX Take 1 Tab by mouth two (2) times a day. Prescriptions Printed Refills  
 phentermine (ADIPEX-P) 37.5 mg tablet 0 Sig: Take 1 Tab by mouth every morning. Max Daily Amount: 37.5 mg.  
 Class: Print Route: Oral  
  
We Performed the Following REFERRAL TO NUTRITION [REF50 Custom] Comments: [facility] fitness/weight loss program  
  
Follow-up Instructions Return in about 6 weeks (around 6/21/2018). To-Do List   
 05/10/2018 Lab:  CBC WITH AUTOMATED DIFF   
  
 05/10/2018 Lab:  LIPID PANEL   
  
 05/10/2018 Lab:  METABOLIC PANEL, COMPREHENSIVE   
  
 05/10/2018 Lab:  TSH 3RD GENERATION   
  
 05/10/2018 Lab:  URIC ACID Referral Information Referral ID Referred By Referred To  
  
 [id number] Eve Maxwelling K Not Available Visits Status Start Date End Date 1 New Request 5/10/18 5/10/19 If your referral has a status of pending review or denied, additional information will be sent to support the outcome of this decision. Patient Instructions Health Maintenance Due Topic Date Due  
 DTaP/Tdap/Td series (1 - Tdap) 08/22/2000  PAP AKA CERVICAL CYTOLOGY  08/22/2000 You need to change something if weight loss is goal.  You are doing great at weight maintenance. I am suggesting counseling and/or nutrition and weight loss strategies through [facility] as we have been stagnant with our efforts to help with our program. 
 
Please call to set up counseling or [facility] with nutrition and exercise: 
 
[address] [place] [address] office: 
Phone: [phone],  
[address] [place], [address] Bharat Roberson 139: 
 
[address] [facility] [address], [place], [address] Phone: [phone]  Maintenance after weight loss: 
 
Best results 10 yrs after initial weight loss are people that do the following:   
 Modify food intake  Increase physical activity  Most walking > 11,000 steps/day  Weigh at least 1x/week  Diet is low calorie  Eat breakfast 
 Greater the initial weight loss, increased chance of sustaining  Low variety of foods  High dietary restraint  Restrict for weight loss and prevention of weight gain  NO CHEAT DAYS  Low Disinhibition  - they are less likely to Overeat in certain situations  Food you like, emotional times  High level of consistency  Eat out < 3x/week  Use no calorie sweeteners  Avoid sugary drinks/cedric, sweet tea  <10 hrs tv/week Exercise 1 hr/day Introducing [facility]!    
 Dear Keyur Green: 
 Thank you for requesting a [employer] account. Our records indicate that you already have an active [employer] account. You can access your account anytime at [email or web address] Did you know that you can access your hospital and ER discharge instructions at any time in Mind[employer]? You can also review all of your test results from your hospital stay or ER visit. Additional Information If you have questions, please visit the Frequently Asked Questions section of the [employer] website at [email or web address] Remember, [employer] is NOT to be used for urgent needs. For medical emergencies, dial 911. Now available from your iPhone and Android! Please provide this summary of care documentation to your next provider. Your primary care clinician is listed as Yung Rosado. If you have any questions after today's visit, please call [phone].

## 2018-05-10 NOTE — PROGRESS NOTES
[facility] Progress Note:   F/up Physician Visit    CC: Obesity      Federico Chun is a 44 y.o. female who is here for her  f/up physician visit for the VLCD Program. Janis Alvarado has completed 35 weeks of the program to date. 2 lbs weight loss, reviewed weight loss and goals. Pt will start with [facility] in about 3-4 weeks (after vacation) and then follow up with provider in 6 weeks. If doing well with maintenance diet and diet set up with [facility] nutrition will go to maintenance with Medically supervised weight loss program.     Starting weight: 4/15/15 at 298 lbs  Current weight: 205 lbs  Goal weight: 185 lbs      Most recent EKG: 10/14/15 at 194lbs    Weight Metrics 5/10/2018 5/10/2018 5/3/2018 5/2/2018 4/25/2018 4/18/2018 4/12/2018   Weight - 205 lb 11.2 oz - 209 lb 12.8 oz 208 lb 209 lb 9.6 oz -   Waist Measure Inches 35 - 37 - 35 36 36   Exercise Mins/week 120 - - - - - 120   Body Fat % 39.3 - - - - - 38.9   BMI - 34.23 kg/m2 - 34.91 kg/m2 34.61 kg/m2 34.88 kg/m2 -         Current Outpatient Prescriptions   Medication Sig Dispense Refill    phentermine (ADIPEX-P) 37.5 mg tablet Take 1 Tab by mouth every morning. Max Daily Amount: 37.5 mg. 30 Tab 0    topiramate (TOPAMAX) 25 mg tablet Take 1 Tab by mouth two (2) times a day. 60 Tab 2    cholecalciferol, VITAMIN D3, (VITAMIN D3) 5,000 unit tab tablet Take 5,000 Units by mouth daily.  POLYETHYLENE GLYCOL 3350 (MIRALAX PO) Take  by mouth every other day.  levothyroxine (SYNTHROID) 137 mcg tablet Take 137 mcg by mouth Daily (before breakfast).  Biotin-Silicon Diox-L-Cysteine 5,000 mcg-100 mg- 50 mg tab Take 5,000 mcg by mouth two (2) times a day.  fluticasone-salmeterol (ADVAIR DISKUS) 250-50 mcg/dose diskus inhaler Take 1 Puff by inhalation every twelve (12) hours as needed.  albuterol (PROVENTIL VENTOLIN) 2.5 mg /3 mL (0.083 %) nebulizer solution by Nebulization route once.  Indications: ACUTE ASTHMA ATTACK  estrogen, conjugated,-medroxyPROGESTERone (PREMPRO) 0.625-2.5 mg per tablet Take 1 Tab by mouth daily. Participation   Did you attend clinic and class last week? no    Review of Systems  Since your last visit, have you experienced any complications? no  If yes, please list:     No CP, SOB, palpitations, lightheadedness, dizziness, constipation. Positives highlight in BOLD. Are you taking an appetite suppressant? no  If so, is there any Chest Pain, Palpitations or Dizziness? BP Readings from Last 10 Encounters:   05/10/18 107/70   05/03/18 114/76   04/25/18 114/79   04/18/18 123/78   04/12/18 116/73   04/04/18 116/76   03/29/18 116/75   03/21/18 118/81   03/15/18 110/71   03/08/18 113/77         Have you received any other medical care this week? yes  If yes, where and for what? PCP for yearly physical      Have you discontinued or changed any medicine or dose of your medicine since your last visit? no  If yes, where and for what? Diet  How many ounces of calorie-free liquids did you consume each day?  100 oz    How many meal replacements did you take each day? 4    Did you have any problems adhering to the program?  no If yes, please explain:       Exercise  Aerobic exercise: 120 min  Resistance exercise: 120 min workouts / week  Any discomfort?  no     If yes, where? Review of Systems  Complete ROS negative except where noted above    Objective  Visit Vitals    /70 (BP 1 Location: Left arm, BP Patient Position: Sitting)    Pulse 81    Temp 97.5 °F (36.4 °C) (Oral)    Resp 14    Ht 5' 5\" (1.651 m)    Wt 205 lb 11.2 oz (93.3 kg)    SpO2 99%    BMI 34.23 kg/m2     No LMP recorded. Patient is not currently having periods (Reason: Polycystic Ovarian Syndrome).     PHQ over the last two weeks 1/4/2017   Little interest or pleasure in doing things Not at all   Feeling down, depressed or hopeless Not at all   Total Score PHQ 2 0         Waist Circumference: I personally reviewed patient's Weight Management Doc Flowsheet  Neck Circumference: I personally reviewed patient's Weight Management Doc Flowsheet  Percent Body Fat: I personally reviewed patient's Weight Management Doc Flowsheet    Physical Exam  Appearance: well appearing, obese, A&O, NAD  HEENT:  NC/AT, PERRL, No scleral icterus  Heart:  RRR without M/R/G  Lungs:  CTAB, no rhonchi, rales, or wheezes with good air exchange   Ext:  No LE Edema    Assessment / Plan    Encounter Diagnoses   Name Primary?  Obesity (BMI 30-39. 9) Yes    Acquired hypothyroidism     Fatty liver disease, nonalcoholic     Screening for lipid disorders        1. Weight management poorly controlled   Progress was reviewed with patient    2. Labs    Latest results reviewed with patient   Lab slip given to pt for f/up HDL labs    3. Diet regimen   # of meal replacements prescribed: 1-2   If modified LCD-nutritional guidelines:    Monthly Goal   As below    Medical monitoring schedule:   Weekly BP/Weight checks   Monthly provider appointments  I have reviewed/discussed the above normal BMI with the patient. I have recommended the following interventions: dietary management education, guidance, and counseling, monitor weight and prescribed dietary intake . .      Ms. Teddy Walsh has a reminder for a \"due or due soon\" health maintenance. I have asked that she contact her primary care provider for follow-up on this health maintenance. Patient Instructions     Health Maintenance Due   Topic Date Due    DTaP/Tdap/Td series (1 - Tdap) 08/22/2000    PAP AKA CERVICAL CYTOLOGY  08/22/2000     You need to change something if weight loss is goal.  You are doing great at weight maintenance.   I am suggesting counseling and/or nutrition and weight loss strategies through [facility] as we have been stagnant with our efforts to help with our program.    Please call to set up counseling or [facility] with nutrition and exercise:    [facility] office:  Phone: [phone],   [address] [facility], [address]      [facility]:    [facility], [place], [address]    Phone: [phone]       Maintenance after weight loss:    Best results 10 yrs after initial weight loss are people that do the following:        Modify food intake   Increase physical activity   Most walking > 11,000 steps/day   Weigh at least 1x/week   Diet is low calorie   Eat breakfast   Greater the initial weight loss, increased chance of sustaining   Low variety of foods   High dietary restraint   Restrict for weight loss and prevention of weight gain   NO CHEAT DAYS   Low Disinhibition    - they are less likely to Overeat in certain situations   Food you like, emotional times   High level of consistency   Eat out < 3x/week   Use no calorie sweeteners   Avoid sugary drinks/cedric, sweet tea   <10 hrs tv/week   Exercise 1 hr/day          Follow-up Disposition:  Return in about 6 weeks (around 6/21/2018). 10 minutes of the 15 minutes face to face time with Jason Schultz consisted of counseling & coordinating and/or discussing treatment plans in reference to her The primary encounter diagnosis was Obesity (BMI 30-39.9). Diagnoses of Acquired hypothyroidism, Fatty liver disease, nonalcoholic, and Screening for lipid disorders were also pertinent to this visit. The patient is to follow up as scheduled and will report to the ED or the office if symptoms change or increase. The patient has voiced understanding and will comply.

## 2018-05-10 NOTE — PATIENT INSTRUCTIONS
Health Maintenance Due   Topic Date Due    DTaP/Tdap/Td series (1 - Tdap) 08/22/2000    PAP AKA CERVICAL CYTOLOGY  08/22/2000     You need to change something if weight loss is goal.  You are doing great at weight maintenance.   I am suggesting counseling and/or nutrition and weight loss strategies through [facility] as we have been stagnant with our efforts to help with our program.    Please call to set up counseling or [facility] with nutrition and exercise:    [address] [place] [address] office:  Phone: [phone],   [facility] [facility], [address]      [facility]:    [address] [facility] [address], [place], [address]    Phone: [phone]       Maintenance after weight loss:    Best results 10 yrs after initial weight loss are people that do the following:        Modify food intake   Increase physical activity   Most walking > 11,000 steps/day   Weigh at least 1x/week   Diet is low calorie   Eat breakfast   Greater the initial weight loss, increased chance of sustaining   Low variety of foods   High dietary restraint   Restrict for weight loss and prevention of weight gain   NO CHEAT DAYS   Low Disinhibition    - they are less likely to Overeat in certain situations   Food you like, emotional times   High level of consistency   Eat out < 3x/week   Use no calorie sweeteners   Avoid sugary drinks/cedric, sweet tea   <10 hrs tv/week   Exercise 1 hr/day

## 2018-05-10 NOTE — PROGRESS NOTES
Chief Complaint   Patient presents with    Weight Management     1. Have you been to the ER, urgent care clinic since your last visit? Hospitalized since your last visit? No    2. Have you seen or consulted any other health care providers outside of the [address] [place] [address] since your last visit? Include any pap smears or colon screening.  No

## 2018-05-11 LAB
ALBUMIN SERPL-MCNC: 4.5 G/DL (ref 3.5–5.5)
ALBUMIN/GLOB SERPL: 1.5 {RATIO} (ref 1.2–2.2)
ALP SERPL-CCNC: 71 IU/L (ref 39–117)
ALT SERPL-CCNC: 24 IU/L (ref 0–32)
AST SERPL-CCNC: 16 IU/L (ref 0–40)
BASOPHILS # BLD AUTO: 0 X10E3/UL (ref 0–0.2)
BASOPHILS NFR BLD AUTO: 0 %
BILIRUB SERPL-MCNC: 0.4 MG/DL (ref 0–1.2)
BUN SERPL-MCNC: 19 MG/DL (ref 6–20)
BUN/CREAT SERPL: 24 (ref 9–23)
CALCIUM SERPL-MCNC: 9.5 MG/DL (ref 8.7–10.2)
CHLORIDE SERPL-SCNC: 103 MMOL/L (ref 96–106)
CHOLEST SERPL-MCNC: 250 MG/DL (ref 100–199)
CO2 SERPL-SCNC: 24 MMOL/L (ref 18–29)
CREAT SERPL-MCNC: 0.8 MG/DL (ref 0.57–1)
EOSINOPHIL # BLD AUTO: 0.1 X10E3/UL (ref 0–0.4)
EOSINOPHIL NFR BLD AUTO: 2 %
ERYTHROCYTE [DISTWIDTH] IN BLOOD BY AUTOMATED COUNT: 13.7 % (ref 12.3–15.4)
GFR SERPLBLD CREATININE-BSD FMLA CKD-EPI: 108 ML/MIN/1.73
GFR SERPLBLD CREATININE-BSD FMLA CKD-EPI: 94 ML/MIN/1.73
GLOBULIN SER CALC-MCNC: 3 G/DL (ref 1.5–4.5)
GLUCOSE SERPL-MCNC: 85 MG/DL (ref 65–99)
HCT VFR BLD AUTO: 43.9 % (ref 34–46.6)
HDLC SERPL-MCNC: 75 MG/DL
HGB BLD-MCNC: 14.4 G/DL (ref 11.1–15.9)
IMM GRANULOCYTES # BLD: 0 X10E3/UL (ref 0–0.1)
IMM GRANULOCYTES NFR BLD: 0 %
INTERPRETATION, 910389: NORMAL
LDLC SERPL CALC-MCNC: 165 MG/DL (ref 0–99)
LYMPHOCYTES # BLD AUTO: 2 X10E3/UL (ref 0.7–3.1)
LYMPHOCYTES NFR BLD AUTO: 35 %
MCH RBC QN AUTO: 28.1 PG (ref 26.6–33)
MCHC RBC AUTO-ENTMCNC: 32.8 G/DL (ref 31.5–35.7)
MCV RBC AUTO: 86 FL (ref 79–97)
MONOCYTES # BLD AUTO: 0.3 X10E3/UL (ref 0.1–0.9)
MONOCYTES NFR BLD AUTO: 5 %
NEUTROPHILS # BLD AUTO: 3.3 X10E3/UL (ref 1.4–7)
NEUTROPHILS NFR BLD AUTO: 58 %
PLATELET # BLD AUTO: 204 X10E3/UL (ref 150–379)
POTASSIUM SERPL-SCNC: 4.7 MMOL/L (ref 3.5–5.2)
PROT SERPL-MCNC: 7.5 G/DL (ref 6–8.5)
RBC # BLD AUTO: 5.13 X10E6/UL (ref 3.77–5.28)
SODIUM SERPL-SCNC: 141 MMOL/L (ref 134–144)
TRIGL SERPL-MCNC: 51 MG/DL (ref 0–149)
TSH SERPL DL<=0.005 MIU/L-ACNC: 0.52 UIU/ML (ref 0.45–4.5)
URATE SERPL-MCNC: 3.1 MG/DL (ref 2.5–7.1)
VLDLC SERPL CALC-MCNC: 10 MG/DL (ref 5–40)
WBC # BLD AUTO: 5.7 X10E3/UL (ref 3.4–10.8)

## 2018-07-18 ENCOUNTER — OFFICE VISIT (OUTPATIENT)
Dept: INTERNAL MEDICINE CLINIC | Age: 38
End: 2018-07-18

## 2018-07-18 VITALS
RESPIRATION RATE: 16 BRPM | BODY MASS INDEX: 37 KG/M2 | SYSTOLIC BLOOD PRESSURE: 123 MMHG | HEIGHT: 65 IN | DIASTOLIC BLOOD PRESSURE: 80 MMHG | WEIGHT: 222.1 LBS | TEMPERATURE: 97.8 F | HEART RATE: 87 BPM | OXYGEN SATURATION: 99 %

## 2018-07-18 DIAGNOSIS — E66.9 OBESITY (BMI 30-39.9): Primary | ICD-10-CM

## 2018-07-18 DIAGNOSIS — K76.0 FATTY LIVER DISEASE, NONALCOHOLIC: ICD-10-CM

## 2018-07-18 DIAGNOSIS — E03.9 ACQUIRED HYPOTHYROIDISM: ICD-10-CM

## 2018-07-18 RX ORDER — PHENTERMINE HYDROCHLORIDE 37.5 MG/1
37.5 TABLET ORAL
Qty: 30 TAB | Refills: 0 | Status: SHIPPED | OUTPATIENT
Start: 2018-07-18

## 2018-07-18 NOTE — PROGRESS NOTES
Chief Complaint   Patient presents with    Weight Management     1. Have you been to the ER, urgent care clinic since your last visit? Hospitalized since your last visit? No    2. Have you seen or consulted any other health care providers outside of the [facility] since your last visit? Include any pap smears or colon screening.  No

## 2018-07-18 NOTE — MR AVS SNAPSHOT
[address] 
[phone] Patient: Nadiya Randall 
MRN: [id number] :1980 Visit Information Date & Time Provider Department Dept. Phone Encounter #  
 2018  1:45 PM Magalie Garcia NP [facility] [id number] Follow-up Instructions Return if symptoms worsen or fail to improve. Upcoming Health Maintenance Date Due DTaP/Tdap/Td series (1 - Tdap) 2000 PAP AKA CERVICAL CYTOLOGY 2000 Influenza Age 5 to Adult 2018 Allergies as of 2018  Review Complete On: 2018 By: Magalie Garica NP Severity Noted Reaction Type Reactions Sulfa (Sulfonamide Antibiotics)  04/15/2015    Rash Current Immunizations  Never Reviewed No immunizations on file. Not reviewed this visit You Were Diagnosed With   
  
 Codes Comments Obesity (BMI 30-39.9)    -  Primary ICD-10-CM: S84.3 ICD-9-CM: 278.00 Acquired hypothyroidism     ICD-10-CM: E03.9 ICD-9-CM: 244.9 Fatty liver disease, nonalcoholic     ZPG-90-VC: I01.6 ICD-9-CM: 571.8 Vitals BP Pulse Temp Resp Height(growth percentile) Weight(growth percentile) 123/80 (BP 1 Location: Left arm, BP Patient Position: Sitting) 87 97.8 °F (36.6 °C) (Oral) 16 5' 5\" (1.651 m) 222 lb 1.6 oz (100.7 kg) SpO2 BMI OB Status Smoking Status 99% 36.96 kg/m2 Polycystic Ovarian Syndrome Never Smoker BMI and BSA Data Body Mass Index Body Surface Area  
 36.96 kg/m 2 2.15 m 2 Preferred Pharmacy Pharmacy Name Phone [facility] [address] [phone] Your Updated Medication List  
  
   
This list is accurate as of 18  2:40 PM.  Always use your most recent med list.  
  
  
  
  
 Lorenda Bar 250-50 mcg/dose diskus inhaler Generic drug:  fluticasone-salmeterol Take 1 Puff by inhalation every twelve (12) hours as needed. albuterol 2.5 mg /3 mL (0.083 %) nebulizer solution Commonly known as:  PROVENTIL VENTOLIN  
by Nebulization route once. Indications: ACUTE ASTHMA ATTACK Biotin-Silicon Diox-L-Cysteine 5,000 mcg -100 mg-50 mg Tab Take 5,000 mcg by mouth two (2) times a day. cholecalciferol (VITAMIN D3) 5,000 unit Tab tablet Commonly known as:  VITAMIN D3 Take 5,000 Units by mouth daily. levothyroxine 137 mcg tablet Commonly known as:  SYNTHROID Take 137 mcg by mouth Daily (before breakfast). MIRALAX PO Take  by mouth every other day. phentermine 37.5 mg tablet Commonly known as:  ADIPEX-P Take 1 Tab by mouth every morning. Max Daily Amount: 37.5 mg. PREMPRO 0.625-2.5 mg per tablet Generic drug:  estrogen (conjugated)-medroxyPROGESTERone Take 1 Tab by mouth daily. topiramate 25 mg tablet Commonly known as:  TOPAMAX Take 1 Tab by mouth two (2) times a day. Prescriptions Printed Refills  
 phentermine (ADIPEX-P) 37.5 mg tablet 0 Sig: Take 1 Tab by mouth every morning. Max Daily Amount: 37.5 mg.  
 Class: Print Route: Oral  
  
Follow-up Instructions Return if symptoms worsen or fail to improve. Patient Instructions Health Maintenance Due Topic Date Due  
 DTaP/Tdap/Td series (1 - Tdap) 08/22/2000  PAP AKA CERVICAL CYTOLOGY  08/22/2000 Will get in touch with [facility] to help with Nutritionist.   
 
  Kori Cross R.D., L.C.S.W  
[phone] [facility] Licensed Clinical  Registered Dietitian Specialties:  Specializing in Eating Disorders, Disordered Eating, and Body Dysmorphia in men, women, teens and children. Advanced Training:  Eating Disorders, Body Dysmorphia, Childhood Trauma,  Crisis Intervention, Spiritual Formation and Direction. Kori received her Bachelor of Science in [employer] and her internship through [employer]. She obtained her Master's degree in Social Work at [employer]. She has over two decades of experience in the fields of eating disorders, disordered eating, body dysmorphia, functional gut disorders and women's health. She is in the process of completing her certificate in spiritual formation at [facility]. She is a member of sports nutrition, eating disorder and women's health practice groups. Car Naidu has been published in [employer] and [employer]. She speaks locally and nationally on women's health topics and eating disorders. She is very involved in local and international relief work and received the Speed Dating by [employer]ant[employer] for her community involvement. Car Naidu serves as the primary therapist with an orphanage in [place] ([place] and is an instructor with [employer] [facility] [address] . Her unique approach to adolescent and adult health incorporates nutrition advice with lifestyle and behavior modification, body image counseling, emotional healing and spiritual growth. Introducing [facility]! Dear Az Mahajan: Thank you for requesting a "[employer] account. Our records indicate that you already have an active "[employer] account. You can access your account anytime at [email or web address] Did you know that you can access your hospital and ER discharge instructions at any time in "[employer]? You can also review all of your test results from your hospital stay or ER visit. Additional Information If you have questions, please visit the Frequently Asked Questions section of the "[employer] website at [email or web address] Remember, "[employer] is NOT to be used for urgent needs. For medical emergencies, dial 911. Now available from your iPhone and Android! Please provide this summary of care documentation to your next provider. Your primary care clinician is listed as Maxi Galeana. If you have any questions after today's visit, please call [phone].

## 2018-07-18 NOTE — Clinical Note
[facility] said they never talked to her 2 mo ago. Any follow up we need on this?   From my standpoint she is no longer on program.

## 2018-07-18 NOTE — PROGRESS NOTES
[facility] Progress Note:   F/up Physician Visit    CC: Obesity      Melani Casillas is a 44 y.o. female who is here for her  f/up physician visit for the [facility] Program. Danae Thomas has been off the Weight Loss Program and instructed to go see our Nutritionist and the patient has not been there for consultation. 17 lbs weight gain since stopping. Wants nutrition services, will work out on own. Gained a substantial amount of weight on first 2-3 weeks after last visit, has been back on work outs and feeling better but wants nutritional support to help her efforts. Feels like she has gotten use from program and now is a crutch. 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 Offered counseling and pt declines at this time. Pt feels well on phentermine/topamax combo, has rx to last for next 2 months, will discuss further refills with PCP. Starting weight: 4/15/15 at 298 lbs  Weight when last active on program: 205 lbs  Current weight 222 lbs  Goal weight: 185 lbs      Most recent EKG: 10/14/15 at 194lbs    Weight Metrics 7/18/2018 7/18/2018 5/10/2018 5/10/2018 5/3/2018 5/2/2018 4/25/2018   Weight - 222 lb 1.6 oz - 205 lb 11.2 oz - 209 lb 12.8 oz 208 lb   Waist Measure Inches 39 - 35 - 37 - 35   Exercise Mins/week 500 - 120 - - - -   Body Fat % 40.9 - 39.3 - - - -   BMI - 36.96 kg/m2 - 34.23 kg/m2 - 34.91 kg/m2 34.61 kg/m2         Current Outpatient Prescriptions   Medication Sig Dispense Refill    phentermine (ADIPEX-P) 37.5 mg tablet Take 1 Tab by mouth every morning. Max Daily Amount: 37.5 mg. 30 Tab 0    topiramate (TOPAMAX) 25 mg tablet Take 1 Tab by mouth two (2) times a day. 60 Tab 2    cholecalciferol, VITAMIN D3, (VITAMIN D3) 5,000 unit tab tablet Take 5,000 Units by mouth daily.  POLYETHYLENE GLYCOL 3350 (MIRALAX PO) Take  by mouth every other day.       levothyroxine (SYNTHROID) 137 mcg tablet Take 137 mcg by mouth Daily (before breakfast).  Biotin-Silicon Diox-L-Cysteine 5,000 mcg-100 mg- 50 mg tab Take 5,000 mcg by mouth two (2) times a day.  fluticasone-salmeterol (ADVAIR DISKUS) 250-50 mcg/dose diskus inhaler Take 1 Puff by inhalation every twelve (12) hours as needed.  albuterol (PROVENTIL VENTOLIN) 2.5 mg /3 mL (0.083 %) nebulizer solution by Nebulization route once. Indications: ACUTE ASTHMA ATTACK      estrogen, conjugated,-medroxyPROGESTERone (PREMPRO) 0.625-2.5 mg per tablet Take 1 Tab by mouth daily. Participation   Did you attend clinic and class last week? no    Review of Systems  Since your last visit, have you experienced any complications? yes  If yes, please list: her body acts very strongly to any type of food, and no matter how much the patient exercises she still continues to gain weight. No CP, SOB, palpitations, lightheadedness, dizziness, constipation. Positives highlight in BOLD. Are you taking an appetite suppressant? yes  If so, is there any Chest Pain, Palpitations or Dizziness? no  BP Readings from Last 10 Encounters:   07/18/18 123/80   05/10/18 107/70   05/03/18 114/76   04/25/18 114/79   04/18/18 123/78   04/12/18 116/73   04/04/18 116/76   03/29/18 116/75   03/21/18 118/81   03/15/18 110/71         Have you received any other medical care this week? no  If yes, where and for what? Have you discontinued or changed any medicine or dose of your medicine since your last visit? no  If yes, where and for what? Diet  How many ounces of calorie-free liquids did you consume each day?  100 oz    How many meal replacements did you take each day? 0    Did you have any problems adhering to the program?  yes If yes, please explain:       Exercise  Aerobic exercise: 500 min  Resistance exercise: 120 workouts / week  Any discomfort?  no     If yes, where?       Review of Systems  Complete ROS negative except where noted above    Objective  Visit Vitals    /80 (BP 1 Location: Left arm, BP Patient Position: Sitting)    Pulse 87    Temp 97.8 °F (36.6 °C) (Oral)    Resp 16    Ht 5' 5\" (1.651 m)    Wt 222 lb 1.6 oz (100.7 kg)    SpO2 99%    BMI 36.96 kg/m2     No LMP recorded. Patient is not currently having periods (Reason: Polycystic Ovarian Syndrome). PHQ over the last two weeks 1/4/2017   Little interest or pleasure in doing things Not at all   Feeling down, depressed, irritable, or hopeless Not at all   Total Score PHQ 2 0         Waist Circumference: I personally reviewed patient's Weight Management Doc Flowsheet  Neck Circumference: I personally reviewed patient's Weight Management Doc Flowsheet  Percent Body Fat: I personally reviewed patient's Weight Management Doc Flowsheet    Physical Exam  Appearance: well appearing, obese, A&O, NAD  HEENT:  NC/AT, PERRL, No scleral icterus  Heart:  RRR without M/R/G  Lungs:  CTAB, no rhonchi, rales, or wheezes with good air exchange   Ext:  No LE Edema    Assessment / Plan    Encounter Diagnoses   Name Primary?  Obesity (BMI 30-39. 9) Yes    Acquired hypothyroidism     Fatty liver disease, nonalcoholic        1. Weight management poorly controlled   Progress was reviewed with patient    2. Labs    Latest results reviewed with patient   Lab slip given to pt for f/up HDL labs    3. Diet regimen   # of meal replacements prescribed: 0   If modified LCD-nutritional guidelines:    Monthly Goal   As below    Medical monitoring schedule:   Weekly BP/Weight checks   Monthly provider appointments  I have reviewed/discussed the above normal BMI with the patient. I have recommended the following interventions: dietary management education, guidance, and counseling, monitor weight and prescribed dietary intake . .      Ms. Agustin Edwards has a reminder for a \"due or due soon\" health maintenance.  I have asked that she contact her primary care provider for follow-up on this health maintenance. Patient Instructions     Health Maintenance Due   Topic Date Due    DTaP/Tdap/Td series (1 - Tdap) 08/22/2000    PAP AKA CERVICAL CYTOLOGY  08/22/2000     Will get in touch with [employer] to help with Nutritionist.        Kori Triana R.D., L.C.S.W   [phone]   [facility]   Licensed Clinical    Registered Dietitian     Specialties:  Specializing in Eating Disorders, Disordered Eating, and Body Dysmorphia in men, women, teens and children. Advanced Training:  Eating Disorders, Body Dysmorphia, Childhood Trauma,  Crisis Intervention, Spiritual Formation and Direction. Kori received her Bachelor of Science in [employer] and her internship through [employer]. She obtained her Master's degree in Social Work at [employer]. She has over two decades of experience in the fields of eating disorders, disordered eating, body dysmorphia, functional gut disorders and women's health. She is in the process of completing her certificate in spiritual formation at [employer] [facility]. She is a member of sports nutrition, eating disorder and women's health practice groups. Jack French has been published in [employer] and [employer]. She speaks locally and nationally on women's health topics and eating disorders. She is very involved in local and international relief work and received the [employer] for her community involvement. Jack French serves as the primary therapist with [place] and is an instructor with [employer] [id number] . Her unique approach to adolescent and adult health incorporates nutrition advice with lifestyle and behavior modification, body image counseling, emotional healing and spiritual growth. Follow-up Disposition:  Return if symptoms worsen or fail to improve.       10 minutes of the 15 minutes face to face time with Antwon Pollard consisted of counseling & coordinating and/or discussing treatment plans in reference to her The primary encounter diagnosis was Obesity (BMI 30-39.9). Diagnoses of Acquired hypothyroidism and Fatty liver disease, nonalcoholic were also pertinent to this visit. The patient is to follow up as scheduled and will report to the ED or the office if symptoms change or increase. The patient has voiced understanding and will comply.

## 2018-07-18 NOTE — PATIENT INSTRUCTIONS
Health Maintenance Due   Topic Date Due    DTaP/Tdap/Td series (1 - Tdap) 08/22/2000    PAP AKA CERVICAL CYTOLOGY  08/22/2000     Will get in touch with [employer] to help with Nutritionist.        Kori Maguire R.D., L.C.S.W   [phone]   [facility]   Licensed Clinical    Registered Dietitian     Specialties:  Specializing in Eating Disorders, Disordered Eating, and Body Dysmorphia in men, women, teens and children. Advanced Training:  Eating Disorders, Body Dysmorphia, Childhood Trauma,  Crisis Intervention, Spiritual Formation and Direction. Kori received her Bachelor of Science in [employer] and her internship through [employer]. She obtained her Master's degree in Social Work at [employer]. She has over two decades of experience in the fields of eating disorders, disordered eating, body dysmorphia, functional gut disorders and women's health. She is in the process of completing her certificate in spiritual formation at [facility] [employer]. She is a member of sports nutrition, eating disorder and women's health practice groups. Yuko Levine has been published in [employer] and [employer]. She speaks locally and nationally on women's health topics and eating disorders. She is very involved in local and international relief work and received the [employer] for her community involvement. Yuko Levine serves as the primary therapist with an orphanage in [place] and is an instructor with [address] [facility] [id number] . Her unique approach to adolescent and adult health incorporates nutrition advice with lifestyle and behavior modification, body image counseling, emotional healing and spiritual growth.

## 2025-04-03 NOTE — PROGRESS NOTES
Patient was informed of her lab results and Dr. Sinha's recs. She verbalized understanding.    Progress Note: Weekly Education Class in the [facility]   Is there anything that you or the patient needs to let the [facility] Physician know about? no  Over the past week, have you experienced any side-effects? no    Nadiya Randall is a 44 y.o. female who is enrolled in [facility]    Visit Vitals    /73 (BP 1 Location: Right arm, BP Patient Position: Sitting)    Pulse 72    Ht 5' 5\" (1.651 m)    Wt 211 lb 12.8 oz (96.1 kg)    BMI 35.25 kg/m2     Weight Metrics 9/27/2017 9/20/2017 9/13/2017 9/13/2017 9/6/2017 9/1/2017 8/18/2017   Weight 211 lb 12.8 oz 213 lb 3.2 oz - 210 lb 11.2 oz 209 lb 3.2 oz 210 lb 9.6 oz 203 lb 12.8 oz   Waist Measure Inches 38 37 35 - 36 37 37   Exercise Mins/week - - 150 - - - -   Body Fat % - - 39.3 - - - -   BMI 35.25 kg/m2 35.48 kg/m2 - 35.06 kg/m2 34.81 kg/m2 35.05 kg/m2 33.91 kg/m2         Have you received any other medical care this week? no  If yes, where and for what? Have you had any change in your medications since your last visit? no  If yes what? Did you have any problems adhering to the program last week? no  If yes, please explain:       Eating Habits Over Last Week:  Did you take in 64 oz of non-caloric fluids?  yes     Did you consume your 4 meal replacements each day? no       Physical Activity Over the Past Week:    Aerobic exercise: 150 min  Resistance exercise: 150 min workouts / week

## 2025-07-17 NOTE — PATIENT INSTRUCTIONS
Health Maintenance Due   Topic Date Due    DTaP/Tdap/Td series (1 - Tdap) 08/22/2000    PAP AKA CERVICAL CYTOLOGY  08/22/2000    INFLUENZA AGE 9 TO ADULT  08/01/2016     Goals  - stick with it!!    Weight loss goal for 4 weeks:   8  Lbs. You can do it!!! Body Mass Index: Care Instructions  Your Care Instructions    Body mass index (BMI) can help you see if your weight is raising your risk for health problems. It uses a formula to compare how much you weigh with how tall you are. · A BMI lower than 18.5 is considered underweight. · A BMI between 18.5 and 24.9 is considered healthy. · A BMI between 25 and 29.9 is considered overweight. A BMI of 30 or higher is considered obese. If your BMI is in the normal range, it means that you have a lower risk for weight-related health problems. If your BMI is in the overweight or obese range, you may be at increased risk for weight-related health problems, such as high blood pressure, heart disease, stroke, arthritis or joint pain, and diabetes. If your BMI is in the underweight range, you may be at increased risk for health problems such as fatigue, lower protection (immunity) against illness, muscle loss, bone loss, hair loss, and hormone problems. BMI is just one measure of your risk for weight-related health problems. You may be at higher risk for health problems if you are not active, you eat an unhealthy diet, or you drink too much alcohol or use tobacco products. Follow-up care is a key part of your treatment and safety. Be sure to make and go to all appointments, and call your doctor if you are having problems. It's also a good idea to know your test results and keep a list of the medicines you take. How can you care for yourself at home? · Practice healthy eating habits. This includes eating plenty of fruits, vegetables, whole grains, lean protein, and low-fat dairy. · If your doctor recommends it, get more exercise. Walking is a good choice.  Bit I just need to fax it still.  We were in [place] yesterday and lost power for a couple hours so I didn't get any paperwork sent.      Kirstin Gupta, ATC     by bit, increase the amount you walk every day. Try for at least 30 minutes on most days of the week. · Do not smoke. Smoking can increase your risk for health problems. If you need help quitting, talk to your doctor about stop-smoking programs and medicines. These can increase your chances of quitting for good. · Limit alcohol to 2 drinks a day for men and 1 drink a day for women. Too much alcohol can cause health problems. If you have a BMI higher than 25  · Your doctor may do other tests to check your risk for weight-related health problems. This may include measuring the distance around your waist. A waist measurement of more than 40 inches in men or 35 inches in women can increase the risk of weight-related health problems. · Talk with your doctor about steps you can take to stay healthy or improve your health. You may need to make lifestyle changes to lose weight and stay healthy, such as changing your diet and getting regular exercise. If you have a BMI lower than 18.5  · Your doctor may do other tests to check your risk for health problems. · Talk with your doctor about steps you can take to stay healthy or improve your health. You may need to make lifestyle changes to gain or maintain weight and stay healthy, such as getting more healthy foods in your diet and doing exercises to build muscle. Where can you learn more? Go to http://shirley-pawan.info/. Enter S176 in the search box to learn more about \"Body Mass Index: Care Instructions. \"  Current as of: January 23, 2017  Content Version: 11.2  © 6053-3247 [employer]. Care instructions adapted under license by [employer] (which disclaims liability or warranty for this information). If you have questions about a medical condition or this instruction, always ask your healthcare professional. [place] [address] any warranty or liability for your use of this information.